# Patient Record
Sex: MALE | Race: WHITE | NOT HISPANIC OR LATINO | Employment: OTHER | URBAN - METROPOLITAN AREA
[De-identification: names, ages, dates, MRNs, and addresses within clinical notes are randomized per-mention and may not be internally consistent; named-entity substitution may affect disease eponyms.]

---

## 2017-08-26 ENCOUNTER — OFFICE VISIT (OUTPATIENT)
Dept: URGENT CARE | Facility: MEDICAL CENTER | Age: 62
End: 2017-08-26
Payer: COMMERCIAL

## 2017-08-26 PROCEDURE — 99213 OFFICE O/P EST LOW 20 MIN: CPT

## 2017-11-09 PROCEDURE — 88305 TISSUE EXAM BY PATHOLOGIST: CPT | Performed by: OTOLARYNGOLOGY

## 2017-11-10 ENCOUNTER — LAB REQUISITION (OUTPATIENT)
Dept: LAB | Facility: HOSPITAL | Age: 62
End: 2017-11-10
Payer: COMMERCIAL

## 2017-11-10 DIAGNOSIS — D48.5 NEOPLASM OF UNCERTAIN BEHAVIOR OF SKIN: ICD-10-CM

## 2019-01-02 ENCOUNTER — TRANSCRIBE ORDERS (OUTPATIENT)
Dept: ADMINISTRATIVE | Facility: HOSPITAL | Age: 64
End: 2019-01-02

## 2019-01-02 DIAGNOSIS — B18.2 CHRONIC HEPATITIS C WITHOUT HEPATIC COMA (HCC): Primary | ICD-10-CM

## 2019-01-04 ENCOUNTER — HOSPITAL ENCOUNTER (OUTPATIENT)
Dept: ULTRASOUND IMAGING | Facility: HOSPITAL | Age: 64
Discharge: HOME | End: 2019-01-04
Attending: INTERNAL MEDICINE
Payer: COMMERCIAL

## 2019-01-04 ENCOUNTER — APPOINTMENT (OUTPATIENT)
Dept: LAB | Facility: HOSPITAL | Age: 64
End: 2019-01-04
Attending: INTERNAL MEDICINE
Payer: COMMERCIAL

## 2019-01-04 ENCOUNTER — TRANSCRIBE ORDERS (OUTPATIENT)
Dept: ADMINISTRATIVE | Facility: HOSPITAL | Age: 64
End: 2019-01-04

## 2019-01-04 DIAGNOSIS — B18.2 CHRONIC HEPATITIS C WITHOUT HEPATIC COMA (HCC): ICD-10-CM

## 2019-01-04 DIAGNOSIS — B18.2 CHRONIC HEPATITIS C WITHOUT HEPATIC COMA (HCC): Primary | ICD-10-CM

## 2019-01-04 LAB
ALBUMIN SERPL BCP-MCNC: 4.2 G/DL (ref 3.5–5.7)
ALP SERPL-CCNC: 75 U/L (ref 55–165)
ALT SERPL W P-5'-P-CCNC: 25 U/L (ref 7–52)
AST SERPL W P-5'-P-CCNC: 47 U/L (ref 13–39)
BILIRUB DIRECT SERPL-MCNC: 0.2 MG/DL (ref 0–0.2)
BILIRUB SERPL-MCNC: 0.9 MG/DL (ref 0.2–1)
FERRITIN SERPL-MCNC: 109 NG/ML (ref 8–388)
IRON SATN MFR SERPL: 57 %
IRON SERPL-MCNC: 190 UG/DL (ref 65–175)
PROT SERPL-MCNC: 6.8 G/DL (ref 6.4–8.9)
TIBC SERPL-MCNC: 335 UG/DL (ref 250–450)

## 2019-01-04 PROCEDURE — 36415 COLL VENOUS BLD VENIPUNCTURE: CPT

## 2019-01-04 PROCEDURE — 83540 ASSAY OF IRON: CPT

## 2019-01-04 PROCEDURE — 87522 HEPATITIS C REVRS TRNSCRPJ: CPT

## 2019-01-04 PROCEDURE — 82390 ASSAY OF CERULOPLASMIN: CPT

## 2019-01-04 PROCEDURE — 80076 HEPATIC FUNCTION PANEL: CPT

## 2019-01-04 PROCEDURE — 86235 NUCLEAR ANTIGEN ANTIBODY: CPT

## 2019-01-04 PROCEDURE — 82103 ALPHA-1-ANTITRYPSIN TOTAL: CPT

## 2019-01-04 PROCEDURE — 76700 US EXAM ABDOM COMPLETE: CPT

## 2019-01-04 PROCEDURE — 82728 ASSAY OF FERRITIN: CPT

## 2019-01-04 PROCEDURE — 83550 IRON BINDING TEST: CPT

## 2019-01-04 PROCEDURE — 87902 NFCT AGT GNTYP ALYS HEP C: CPT

## 2019-01-05 LAB
A1AT SERPL-MCNC: 150 MG/DL (ref 90–200)
ACTIN IGG SERPL-ACNC: 8 UNITS (ref 0–19)
CERULOPLASMIN SERPL-MCNC: 17.9 MG/DL (ref 16–31)

## 2019-01-09 LAB
HCV GENTYP SERPL NAA+PROBE: NORMAL
HCV PLEASE NOTE: NORMAL

## 2019-01-23 LAB
HCV RNA SERPL NAA+PROBE-ACNC: NORMAL IU/ML
HCV RNA SERPL NAA+PROBE-LOG IU: 6.75 LOG10 IU/ML
TEST INFORMATION: NORMAL

## 2019-04-02 ENCOUNTER — APPOINTMENT (OUTPATIENT)
Dept: LAB | Facility: HOSPITAL | Age: 64
End: 2019-04-02
Attending: INTERNAL MEDICINE
Payer: COMMERCIAL

## 2019-04-02 ENCOUNTER — TRANSCRIBE ORDERS (OUTPATIENT)
Dept: ADMINISTRATIVE | Facility: HOSPITAL | Age: 64
End: 2019-04-02

## 2019-04-02 DIAGNOSIS — Z79.899 ENCOUNTER FOR LONG-TERM (CURRENT) USE OF MEDICATIONS: Primary | ICD-10-CM

## 2019-04-02 DIAGNOSIS — E78.2 MIXED HYPERLIPIDEMIA: ICD-10-CM

## 2019-04-02 DIAGNOSIS — B18.2 CHRONIC HEPATITIS C WITHOUT HEPATIC COMA (HCC): ICD-10-CM

## 2019-04-02 DIAGNOSIS — Z79.899 ENCOUNTER FOR LONG-TERM (CURRENT) USE OF MEDICATIONS: ICD-10-CM

## 2019-04-02 LAB
ALBUMIN SERPL BCP-MCNC: 4.3 G/DL (ref 3.5–5.7)
ALP SERPL-CCNC: 72 U/L (ref 55–165)
ALT SERPL W P-5'-P-CCNC: 11 U/L (ref 7–52)
ANION GAP SERPL CALCULATED.3IONS-SCNC: 6 MMOL/L (ref 4–13)
AST SERPL W P-5'-P-CCNC: 23 U/L (ref 13–39)
BASOPHILS # BLD AUTO: 0.1 THOUSANDS/ΜL (ref 0–0.1)
BASOPHILS NFR BLD AUTO: 1 % (ref 0–1)
BILIRUB DIRECT SERPL-MCNC: 0.1 MG/DL (ref 0–0.2)
BILIRUB SERPL-MCNC: 0.5 MG/DL (ref 0.2–1)
BUN SERPL-MCNC: 24 MG/DL (ref 7–25)
CALCIUM SERPL-MCNC: 9.5 MG/DL (ref 8.6–10.5)
CHLORIDE SERPL-SCNC: 102 MMOL/L (ref 98–107)
CHOLEST SERPL-MCNC: 228 MG/DL (ref 0–200)
CO2 SERPL-SCNC: 32 MMOL/L (ref 21–31)
CREAT SERPL-MCNC: 0.96 MG/DL (ref 0.7–1.3)
EOSINOPHIL # BLD AUTO: 0.2 THOUSAND/ΜL (ref 0–0.61)
EOSINOPHIL NFR BLD AUTO: 3 % (ref 0–6)
ERYTHROCYTE [DISTWIDTH] IN BLOOD BY AUTOMATED COUNT: 14 % (ref 11.6–15.1)
GFR SERPL CREATININE-BSD FRML MDRD: 83 ML/MIN/1.73SQ M
GLUCOSE P FAST SERPL-MCNC: 82 MG/DL (ref 65–99)
HCT VFR BLD AUTO: 45 % (ref 42–52)
HDLC SERPL-MCNC: 44 MG/DL (ref 40–60)
HGB BLD-MCNC: 15.4 G/DL (ref 12–17)
LDLC SERPL CALC-MCNC: 107 MG/DL (ref 0–100)
LYMPHOCYTES # BLD AUTO: 1.6 THOUSANDS/ΜL (ref 0.6–4.47)
LYMPHOCYTES NFR BLD AUTO: 25 % (ref 14–44)
MCH RBC QN AUTO: 29.4 PG (ref 26.8–34.3)
MCHC RBC AUTO-ENTMCNC: 34.3 G/DL (ref 31.4–37.4)
MCV RBC AUTO: 86 FL (ref 82–98)
MONOCYTES # BLD AUTO: 0.6 THOUSAND/ΜL (ref 0.17–1.22)
MONOCYTES NFR BLD AUTO: 9 % (ref 4–12)
NEUTROPHILS # BLD AUTO: 4 THOUSANDS/ΜL (ref 1.85–7.62)
NEUTS SEG NFR BLD AUTO: 62 % (ref 43–75)
NONHDLC SERPL-MCNC: 184 MG/DL
PLATELET # BLD AUTO: 177 THOUSANDS/UL (ref 149–390)
PMV BLD AUTO: 8.4 FL (ref 8.9–12.7)
POTASSIUM SERPL-SCNC: 4.3 MMOL/L (ref 3.5–5.5)
PROT SERPL-MCNC: 6.9 G/DL (ref 6.4–8.9)
RBC # BLD AUTO: 5.23 MILLION/UL (ref 3.88–5.62)
SODIUM SERPL-SCNC: 140 MMOL/L (ref 134–143)
TRIGL SERPL-MCNC: 384 MG/DL (ref 44–166)
WBC # BLD AUTO: 6.5 THOUSAND/UL (ref 4.31–10.16)

## 2019-04-02 PROCEDURE — 36415 COLL VENOUS BLD VENIPUNCTURE: CPT

## 2019-04-02 PROCEDURE — 85025 COMPLETE CBC W/AUTO DIFF WBC: CPT

## 2019-04-02 PROCEDURE — 80061 LIPID PANEL: CPT

## 2019-04-02 PROCEDURE — 80053 COMPREHEN METABOLIC PANEL: CPT

## 2019-04-02 PROCEDURE — 82248 BILIRUBIN DIRECT: CPT

## 2019-08-19 ENCOUNTER — APPOINTMENT (OUTPATIENT)
Dept: LAB | Facility: HOSPITAL | Age: 64
End: 2019-08-19
Attending: INTERNAL MEDICINE
Payer: COMMERCIAL

## 2019-08-19 ENCOUNTER — TRANSCRIBE ORDERS (OUTPATIENT)
Dept: URGENT CARE | Facility: HOSPITAL | Age: 64
End: 2019-08-19

## 2019-08-19 DIAGNOSIS — B19.20 HEPATITIS C VIRUS INFECTION WITHOUT HEPATIC COMA, UNSPECIFIED CHRONICITY: ICD-10-CM

## 2019-08-19 DIAGNOSIS — B19.20 HEPATITIS C VIRUS INFECTION WITHOUT HEPATIC COMA, UNSPECIFIED CHRONICITY: Primary | ICD-10-CM

## 2019-08-19 PROCEDURE — 87522 HEPATITIS C REVRS TRNSCRPJ: CPT

## 2019-08-19 PROCEDURE — 36415 COLL VENOUS BLD VENIPUNCTURE: CPT

## 2019-08-21 LAB
HCV RNA SERPL NAA+PROBE-ACNC: NORMAL IU/ML
TEST INFORMATION: NORMAL

## 2020-04-21 ENCOUNTER — APPOINTMENT (OUTPATIENT)
Dept: LAB | Facility: CLINIC | Age: 65
End: 2020-04-21
Payer: COMMERCIAL

## 2020-04-21 ENCOUNTER — TRANSCRIBE ORDERS (OUTPATIENT)
Dept: URGENT CARE | Facility: CLINIC | Age: 65
End: 2020-04-21

## 2020-04-21 DIAGNOSIS — E74.89: ICD-10-CM

## 2020-04-21 DIAGNOSIS — B18.2 CHRONIC HEPATITIS C WITHOUT HEPATIC COMA (HCC): ICD-10-CM

## 2020-04-21 DIAGNOSIS — E78.5 HYPERLIPIDEMIA, UNSPECIFIED HYPERLIPIDEMIA TYPE: ICD-10-CM

## 2020-04-21 DIAGNOSIS — Z79.899 ENCOUNTER FOR LONG-TERM (CURRENT) USE OF OTHER MEDICATIONS: ICD-10-CM

## 2020-04-21 DIAGNOSIS — E78.5 HYPERLIPIDEMIA, UNSPECIFIED HYPERLIPIDEMIA TYPE: Primary | ICD-10-CM

## 2020-04-21 DIAGNOSIS — R63.5 WEIGHT GAIN: ICD-10-CM

## 2020-04-21 DIAGNOSIS — E55.9 AVITAMINOSIS D: ICD-10-CM

## 2020-04-21 DIAGNOSIS — B18.2 CHRONIC HEPATITIS C WITHOUT HEPATIC COMA (HCC): Primary | ICD-10-CM

## 2020-04-21 LAB
25(OH)D3 SERPL-MCNC: 27.6 NG/ML (ref 30–100)
ALBUMIN SERPL BCP-MCNC: 4.5 G/DL (ref 3.5–5)
ALP SERPL-CCNC: 76 U/L (ref 46–116)
ALT SERPL W P-5'-P-CCNC: 17 U/L (ref 12–78)
ANION GAP SERPL CALCULATED.3IONS-SCNC: 4 MMOL/L (ref 4–13)
AST SERPL W P-5'-P-CCNC: 25 U/L (ref 5–45)
BASOPHILS # BLD AUTO: 0.03 THOUSANDS/ΜL (ref 0–0.1)
BASOPHILS NFR BLD AUTO: 1 % (ref 0–1)
BILIRUB SERPL-MCNC: 0.57 MG/DL (ref 0.2–1)
BUN SERPL-MCNC: 19 MG/DL (ref 5–25)
CALCIUM SERPL-MCNC: 9.8 MG/DL (ref 8.3–10.1)
CHLORIDE SERPL-SCNC: 108 MMOL/L (ref 100–108)
CHOLEST SERPL-MCNC: 208 MG/DL (ref 50–200)
CO2 SERPL-SCNC: 29 MMOL/L (ref 21–32)
CREAT SERPL-MCNC: 0.99 MG/DL (ref 0.6–1.3)
EOSINOPHIL # BLD AUTO: 0.16 THOUSAND/ΜL (ref 0–0.61)
EOSINOPHIL NFR BLD AUTO: 3 % (ref 0–6)
ERYTHROCYTE [DISTWIDTH] IN BLOOD BY AUTOMATED COUNT: 12.4 % (ref 11.6–15.1)
EST. AVERAGE GLUCOSE BLD GHB EST-MCNC: 117 MG/DL
GFR SERPL CREATININE-BSD FRML MDRD: 80 ML/MIN/1.73SQ M
GLUCOSE P FAST SERPL-MCNC: 100 MG/DL (ref 65–99)
HBA1C MFR BLD: 5.7 %
HCT VFR BLD AUTO: 48 % (ref 36.5–49.3)
HDLC SERPL-MCNC: 43 MG/DL
HGB BLD-MCNC: 15.5 G/DL (ref 12–17)
IMM GRANULOCYTES # BLD AUTO: 0 THOUSAND/UL (ref 0–0.2)
IMM GRANULOCYTES NFR BLD AUTO: 0 % (ref 0–2)
LDLC SERPL CALC-MCNC: 145 MG/DL (ref 0–100)
LYMPHOCYTES # BLD AUTO: 1.15 THOUSANDS/ΜL (ref 0.6–4.47)
LYMPHOCYTES NFR BLD AUTO: 21 % (ref 14–44)
MCH RBC QN AUTO: 27.9 PG (ref 26.8–34.3)
MCHC RBC AUTO-ENTMCNC: 32.3 G/DL (ref 31.4–37.4)
MCV RBC AUTO: 86 FL (ref 82–98)
MONOCYTES # BLD AUTO: 0.44 THOUSAND/ΜL (ref 0.17–1.22)
MONOCYTES NFR BLD AUTO: 8 % (ref 4–12)
NEUTROPHILS # BLD AUTO: 3.83 THOUSANDS/ΜL (ref 1.85–7.62)
NEUTS SEG NFR BLD AUTO: 67 % (ref 43–75)
NONHDLC SERPL-MCNC: 165 MG/DL
NRBC BLD AUTO-RTO: 0 /100 WBCS
PLATELET # BLD AUTO: 161 THOUSANDS/UL (ref 149–390)
PMV BLD AUTO: 10.5 FL (ref 8.9–12.7)
POTASSIUM SERPL-SCNC: 4.3 MMOL/L (ref 3.5–5.3)
PROT SERPL-MCNC: 7.5 G/DL (ref 6.4–8.2)
RBC # BLD AUTO: 5.56 MILLION/UL (ref 3.88–5.62)
SODIUM SERPL-SCNC: 141 MMOL/L (ref 136–145)
T4 FREE SERPL-MCNC: 0.8 NG/DL (ref 0.76–1.46)
TRIGL SERPL-MCNC: 99 MG/DL
TSH SERPL DL<=0.05 MIU/L-ACNC: 2.15 UIU/ML (ref 0.36–3.74)
WBC # BLD AUTO: 5.61 THOUSAND/UL (ref 4.31–10.16)

## 2020-04-21 PROCEDURE — 83036 HEMOGLOBIN GLYCOSYLATED A1C: CPT

## 2020-04-21 PROCEDURE — 80053 COMPREHEN METABOLIC PANEL: CPT

## 2020-04-21 PROCEDURE — 82306 VITAMIN D 25 HYDROXY: CPT

## 2020-04-21 PROCEDURE — 85025 COMPLETE CBC W/AUTO DIFF WBC: CPT

## 2020-04-21 PROCEDURE — 87522 HEPATITIS C REVRS TRNSCRPJ: CPT

## 2020-04-21 PROCEDURE — 36415 COLL VENOUS BLD VENIPUNCTURE: CPT

## 2020-04-21 PROCEDURE — 84439 ASSAY OF FREE THYROXINE: CPT

## 2020-04-21 PROCEDURE — 84443 ASSAY THYROID STIM HORMONE: CPT

## 2020-04-21 PROCEDURE — 80061 LIPID PANEL: CPT

## 2020-04-23 LAB
HCV RNA SERPL NAA+PROBE-ACNC: NORMAL IU/ML
TEST INFORMATION: NORMAL

## 2020-06-01 ENCOUNTER — TELEPHONE (OUTPATIENT)
Dept: OBGYN CLINIC | Facility: HOSPITAL | Age: 65
End: 2020-06-01

## 2020-06-01 DIAGNOSIS — M17.0 PRIMARY OSTEOARTHRITIS OF BOTH KNEES: Primary | ICD-10-CM

## 2020-06-16 DIAGNOSIS — M17.0 PRIMARY OSTEOARTHRITIS OF BOTH KNEES: Primary | ICD-10-CM

## 2020-06-18 ENCOUNTER — TELEPHONE (OUTPATIENT)
Dept: OBGYN CLINIC | Facility: HOSPITAL | Age: 65
End: 2020-06-18

## 2020-06-22 ENCOUNTER — OFFICE VISIT (OUTPATIENT)
Dept: OBGYN CLINIC | Facility: CLINIC | Age: 65
End: 2020-06-22
Payer: COMMERCIAL

## 2020-06-22 VITALS — SYSTOLIC BLOOD PRESSURE: 136 MMHG | WEIGHT: 193.4 LBS | DIASTOLIC BLOOD PRESSURE: 76 MMHG | TEMPERATURE: 97.7 F

## 2020-06-22 DIAGNOSIS — M17.0 PRIMARY OSTEOARTHRITIS OF BOTH KNEES: ICD-10-CM

## 2020-06-22 DIAGNOSIS — M19.012 PRIMARY OSTEOARTHRITIS OF BOTH SHOULDERS: Primary | ICD-10-CM

## 2020-06-22 DIAGNOSIS — M19.011 PRIMARY OSTEOARTHRITIS OF BOTH SHOULDERS: Primary | ICD-10-CM

## 2020-06-22 PROCEDURE — 99213 OFFICE O/P EST LOW 20 MIN: CPT | Performed by: ORTHOPAEDIC SURGERY

## 2020-06-22 RX ORDER — HYDROCODONE BITARTRATE AND ACETAMINOPHEN 5; 325 MG/1; MG/1
1 TABLET ORAL EVERY 6 HOURS PRN
COMMUNITY

## 2020-07-06 ENCOUNTER — TELEPHONE (OUTPATIENT)
Dept: OBGYN CLINIC | Facility: HOSPITAL | Age: 65
End: 2020-07-06

## 2020-07-06 ENCOUNTER — OFFICE VISIT (OUTPATIENT)
Dept: OBGYN CLINIC | Facility: CLINIC | Age: 65
End: 2020-07-06
Payer: COMMERCIAL

## 2020-07-06 VITALS
SYSTOLIC BLOOD PRESSURE: 136 MMHG | TEMPERATURE: 98.7 F | BODY MASS INDEX: 24.31 KG/M2 | WEIGHT: 189.4 LBS | DIASTOLIC BLOOD PRESSURE: 86 MMHG | HEIGHT: 74 IN

## 2020-07-06 DIAGNOSIS — M77.02 EPICONDYLITIS ELBOW, MEDIAL, LEFT: ICD-10-CM

## 2020-07-06 DIAGNOSIS — M19.011 PRIMARY OSTEOARTHRITIS OF BOTH SHOULDERS: Primary | ICD-10-CM

## 2020-07-06 DIAGNOSIS — M70.61 TROCHANTERIC BURSITIS OF RIGHT HIP: ICD-10-CM

## 2020-07-06 DIAGNOSIS — M19.012 PRIMARY OSTEOARTHRITIS OF BOTH SHOULDERS: Primary | ICD-10-CM

## 2020-07-06 DIAGNOSIS — M17.0 PRIMARY OSTEOARTHRITIS OF BOTH KNEES: ICD-10-CM

## 2020-07-06 PROCEDURE — 99213 OFFICE O/P EST LOW 20 MIN: CPT | Performed by: ORTHOPAEDIC SURGERY

## 2020-07-06 PROCEDURE — 20610 DRAIN/INJ JOINT/BURSA W/O US: CPT | Performed by: PHYSICIAN ASSISTANT

## 2020-07-06 PROCEDURE — 20605 DRAIN/INJ JOINT/BURSA W/O US: CPT | Performed by: PHYSICIAN ASSISTANT

## 2020-07-06 RX ORDER — METHYLPREDNISOLONE ACETATE 40 MG/ML
2 INJECTION, SUSPENSION INTRA-ARTICULAR; INTRALESIONAL; INTRAMUSCULAR; SOFT TISSUE
Status: COMPLETED | OUTPATIENT
Start: 2020-07-06 | End: 2020-07-06

## 2020-07-06 RX ORDER — BUPIVACAINE HYDROCHLORIDE 5 MG/ML
1 INJECTION, SOLUTION PERINEURAL
Status: COMPLETED | OUTPATIENT
Start: 2020-07-06 | End: 2020-07-06

## 2020-07-06 RX ORDER — BUPIVACAINE HYDROCHLORIDE 5 MG/ML
3.5 INJECTION, SOLUTION PERINEURAL
Status: COMPLETED | OUTPATIENT
Start: 2020-07-06 | End: 2020-07-06

## 2020-07-06 RX ORDER — BETAMETHASONE SODIUM PHOSPHATE AND BETAMETHASONE ACETATE 3; 3 MG/ML; MG/ML
6 INJECTION, SUSPENSION INTRA-ARTICULAR; INTRALESIONAL; INTRAMUSCULAR; SOFT TISSUE
Status: COMPLETED | OUTPATIENT
Start: 2020-07-06 | End: 2020-07-06

## 2020-07-06 RX ADMIN — BUPIVACAINE HYDROCHLORIDE 3.5 ML: 5 INJECTION, SOLUTION PERINEURAL at 08:25

## 2020-07-06 RX ADMIN — METHYLPREDNISOLONE ACETATE 2 ML: 40 INJECTION, SUSPENSION INTRA-ARTICULAR; INTRALESIONAL; INTRAMUSCULAR; SOFT TISSUE at 08:25

## 2020-07-06 RX ADMIN — METHYLPREDNISOLONE ACETATE 2 ML: 40 INJECTION, SUSPENSION INTRA-ARTICULAR; INTRALESIONAL; INTRAMUSCULAR; SOFT TISSUE at 08:24

## 2020-07-06 RX ADMIN — BUPIVACAINE HYDROCHLORIDE 3.5 ML: 5 INJECTION, SOLUTION PERINEURAL at 08:27

## 2020-07-06 RX ADMIN — BUPIVACAINE HYDROCHLORIDE 1 ML: 5 INJECTION, SOLUTION PERINEURAL at 08:26

## 2020-07-06 RX ADMIN — BUPIVACAINE HYDROCHLORIDE 3.5 ML: 5 INJECTION, SOLUTION PERINEURAL at 08:24

## 2020-07-06 RX ADMIN — METHYLPREDNISOLONE ACETATE 2 ML: 40 INJECTION, SUSPENSION INTRA-ARTICULAR; INTRALESIONAL; INTRAMUSCULAR; SOFT TISSUE at 08:27

## 2020-07-06 RX ADMIN — BETAMETHASONE SODIUM PHOSPHATE AND BETAMETHASONE ACETATE 6 MG: 3; 3 INJECTION, SUSPENSION INTRA-ARTICULAR; INTRALESIONAL; INTRAMUSCULAR; SOFT TISSUE at 08:26

## 2020-07-06 NOTE — TELEPHONE ENCOUNTER
Called and spoke to patient letting him know that the pharmacy has been trying to reach him in order to get consent for shipment of the medication

## 2020-07-06 NOTE — PROGRESS NOTES
ASSESSMENT/PLAN:    Diagnoses and all orders for this visit:    Primary osteoarthritis of both shoulders  -     Large joint arthrocentesis: bilateral subacromial bursa    Primary osteoarthritis of both knees  -     Large joint arthrocentesis: bilateral knee    Trochanteric bursitis of right hip  -     Large joint arthrocentesis: R greater trochanteric bursa    Epicondylitis elbow, medial, left  -     Medium joint arthrocentesis: L elbow        The patient's bilateral knees and shoulders were injected with Depo-Medrol and Marcaine  His left elbow was injected with Celestone and Marcaine  His right trochanteric bursa was injected with Depo-Medrol and Marcaine  Return in about 3 months (around 10/6/2020)  _____________________________________________________  CHIEF COMPLAINT:  Chief Complaint   Patient presents with    Left Knee - Follow-up    Right Knee - Follow-up    Left Shoulder - Follow-up    Right Shoulder - Follow-up    Right Hip - Follow-up    Left Elbow - Follow-up         SUBJECTIVE:  Milana Brito is a 76 y o  male with a past medical history of primary osteoarthritis of bilateral shoulders and knees, trochanteric bursitis of the right hip, and medial epicondylitis of the left elbow  He would like injections today, prior to leaving on vacation  He denies any numbness or tingling  He denies any fever or chills  PAST MEDICAL HISTORY:  Past Medical History:   Diagnosis Date    Arthritis     Lumbar herniated disc        PAST SURGICAL HISTORY:  Past Surgical History:   Procedure Laterality Date    HERNIA REPAIR  01/03/2005    KNEE ARTHROSCOPY Left 11/30/2005    KNEE ARTHROSCOPY Right 07/20/2005    SHOULDER ARTHROSCOPY Left     in the 1980's    TONSILLECTOMY         FAMILY HISTORY:  History reviewed  No pertinent family history      SOCIAL HISTORY:  Social History     Tobacco Use    Smoking status: Never Smoker    Smokeless tobacco: Never Used   Substance Use Topics    Alcohol use: Not Currently     Comment: quit 2000    Drug use: Not on file       MEDICATIONS:    Current Outpatient Medications:     HYDROcodone-acetaminophen (NORCO) 5-325 mg per tablet, Take 1 tablet by mouth every 6 (six) hours as needed for pain, Disp: , Rfl:     ALLERGIES:  Allergies   Allergen Reactions    Tetracyclines & Related        ROS:  Review of Systems     Constitutional: Negative for fatigue, fever or loss of apetite  HENT: Negative  Respiratory: Negative for shortness of breath, dyspnea  Cardiovascular: Negative for chest pain/tightness  Gastrointestinal: Negative for abdominal pain, N/V  Endocrine: Negative for cold/heat intolerance, unexplained weight loss/gain  Genitourinary: Negative for flank pain, dysuria, hematuria  Musculoskeletal: Positive for arthralgia   Skin: Negative for rash  Neurological: Negative for numbness or tingling  Psychiatric/Behavioral: Negative for agitation  _____________________________________________________  PHYSICAL EXAMINATION:    Blood pressure 136/86, temperature 98 7 °F (37 1 °C), height 6' 2" (1 88 m), weight 85 9 kg (189 lb 6 4 oz)  Constitutional: Oriented to person, place, and time  Appears well-developed and well-nourished  No distress  HENT:   Head: Normocephalic  Eyes: Conjunctivae are normal  Right eye exhibits no discharge  Left eye exhibits no discharge  No scleral icterus  Cardiovascular: Normal rate  Pulmonary/Chest: Effort normal    Neurological: Alert and oriented to person, place, and time  Skin: Skin is warm and dry  No rash noted  Not diaphoretic  No erythema  No pallor  Psychiatric: Normal mood and affect   Behavior is normal  Judgment and thought content normal       MUSCULOSKELETAL EXAMINATION:   Physical Exam  Ortho Exam    Bilateral lower extremities are neurovascularly intact  Toes are pink and mobile   Compartments are soft  No warmth, erythema or ecchymosis  ROM of her knees are from 5-115 degrees  Negative Lachman, drawer or pivot shift  No medial instability  Medial joint line tenderness, slight lateral joint line tenderness  Patellofemoral crepitation  Tenderness over right trochanteric bursa    Neck was soft and supple  There is a negative axial compression test in his neck  Bilateral upper extremity with neurovascularly intact  Fingers are pink and mobile  Compartments are soft  Range of motion of the shoulders was intact  There is minimal signs of impingement  There is no instability  Glenohumeral crepitation is present  Rotator cuff strength is 4/5  Pulses intact  Right elbow range of motion was from 0-130 degrees  Rotation was 90° in each direction  There is no collateral ligament dysfunction  There is pain along the medial epicondyle  Objective:  BP Readings from Last 1 Encounters:   07/06/20 136/86      Wt Readings from Last 1 Encounters:   07/06/20 85 9 kg (189 lb 6 4 oz)        BMI:   Estimated body mass index is 24 32 kg/m² as calculated from the following:    Height as of this encounter: 6' 2" (1 88 m)  Weight as of this encounter: 85 9 kg (189 lb 6 4 oz)      PROCEDURES PERFORMED:  Large joint arthrocentesis: bilateral subacromial bursa  Date/Time: 7/6/2020 8:24 AM  Consent given by: patient  Site marked: site marked  Timeout: Immediately prior to procedure a time out was called to verify the correct patient, procedure, equipment, support staff and site/side marked as required   Supporting Documentation  Indications: pain   Procedure Details  Location: shoulder - bilateral subacromial bursa  Preparation: Patient was prepped and draped in the usual sterile fashion  Needle size: 22 G  Ultrasound guidance: no  Approach: lateral    Medications (Right): 3 5 mL bupivacaine 0 5 %; 2 mL methylPREDNISolone acetate 40 mg/mLMedications (Left): 3 5 mL bupivacaine 0 5 %; 2 mL methylPREDNISolone acetate 40 mg/mL   Patient tolerance: patient tolerated the procedure well with no immediate complications  Dressing:  Sterile dressing applied    Large joint arthrocentesis: bilateral knee  Date/Time: 7/6/2020 8:25 AM  Consent given by: patient  Site marked: site marked  Timeout: Immediately prior to procedure a time out was called to verify the correct patient, procedure, equipment, support staff and site/side marked as required   Supporting Documentation  Indications: pain   Procedure Details  Location: knee - bilateral knee  Preparation: Patient was prepped and draped in the usual sterile fashion  Needle size: 22 G  Ultrasound guidance: no  Approach: lateral    Medications (Right): 3 5 mL bupivacaine 0 5 %; 2 mL methylPREDNISolone acetate 40 mg/mLMedications (Left): 3 5 mL bupivacaine 0 5 %; 2 mL methylPREDNISolone acetate 40 mg/mL   Patient tolerance: patient tolerated the procedure well with no immediate complications  Dressing:  Sterile dressing applied    Medium joint arthrocentesis: L elbow  Date/Time: 7/6/2020 8:26 AM  Consent given by: patient  Site marked: site marked  Timeout: Immediately prior to procedure a time out was called to verify the correct patient, procedure, equipment, support staff and site/side marked as required   Supporting Documentation  Indications: pain   Procedure Details  Location: elbow - L elbow  Medications administered: 1 mL bupivacaine 0 5 %; 6 mg betamethasone acetate-betamethasone sodium phosphate 6 (3-3) mg/mL    Patient tolerance: patient tolerated the procedure well with no immediate complications  Dressing:  Sterile dressing applied    Large joint arthrocentesis: R greater trochanteric bursa  Date/Time: 7/6/2020 8:27 AM  Consent given by: patient  Site marked: site marked  Timeout: Immediately prior to procedure a time out was called to verify the correct patient, procedure, equipment, support staff and site/side marked as required   Supporting Documentation  Indications: pain   Procedure Details  Location: hip - R greater trochanteric bursa  Preparation: Patient was prepped and draped in the usual sterile fashion  Needle size: 22 G  Approach: lateral  Medications administered: 3 5 mL bupivacaine 0 5 %; 2 mL methylPREDNISolone acetate 40 mg/mL    Patient tolerance: patient tolerated the procedure well with no immediate complications  Dressing:  Sterile dressing applied            Lucero Loera PA-C

## 2020-07-28 NOTE — TELEPHONE ENCOUNTER
I am not sure what medication a sent to his house  When he comes for the 1st injection, we can determine how many subsequent follow-up visits he will need for the remainder of the injections    Thank you

## 2020-07-28 NOTE — TELEPHONE ENCOUNTER
Patient called asking to speak to injection team  Per patient, he is receiving 5 injections? Patient has an appt on 07 31 but states he needs more appts w/ Dr Rosa Villegas in order to receive the injections    Please advise  How many appts does patient need? Referral states " Patient called the office to let us know that his injections were mailed to his house    I made his first appt for the Euflexxa in the [place] office, he will bring the injections to his appt, his future appts if needed will need to be scheduled  "

## 2020-07-29 NOTE — TELEPHONE ENCOUNTER
Spoke to patient he is very willing to try a new medication and is hoping for good results  I reminded him to bring the medications to his appointment on Friday

## 2020-07-31 ENCOUNTER — OFFICE VISIT (OUTPATIENT)
Dept: OBGYN CLINIC | Facility: CLINIC | Age: 65
End: 2020-07-31
Payer: COMMERCIAL

## 2020-07-31 VITALS
DIASTOLIC BLOOD PRESSURE: 88 MMHG | BODY MASS INDEX: 23.44 KG/M2 | WEIGHT: 182.6 LBS | SYSTOLIC BLOOD PRESSURE: 151 MMHG | HEIGHT: 74 IN | HEART RATE: 71 BPM

## 2020-07-31 DIAGNOSIS — M17.0 PRIMARY OSTEOARTHRITIS OF BOTH KNEES: Primary | ICD-10-CM

## 2020-07-31 PROCEDURE — 20610 DRAIN/INJ JOINT/BURSA W/O US: CPT | Performed by: ORTHOPAEDIC SURGERY

## 2020-07-31 RX ORDER — HYALURONATE SODIUM 10 MG/ML
20 SYRINGE (ML) INTRAARTICULAR
Status: SHIPPED | OUTPATIENT
Start: 2020-07-31

## 2020-07-31 RX ADMIN — Medication 20 MG: at 12:12

## 2020-07-31 NOTE — PROGRESS NOTES
ASSESSMENT/PLAN:    Diagnoses and all orders for this visit:    Primary osteoarthritis of both knees    Other orders  -     Large joint arthrocentesis      Both knees were injected with his first set of Euflexxa  He tolerated the injections quite well  He will follow up next week for his second set of Euflexxa  Return in about 1 week (around 8/7/2020)  _____________________________________________________  CHIEF COMPLAINT:  Chief Complaint   Patient presents with    Left Knee - Follow-up    Right Knee - Follow-up         SUBJECTIVE:  Haim Friend is a 76y o  year old male who presents for viscosupplementation of bilateral knees  He is here to start Euflexxa injections  He complains of knee pain  He denies any numbness or tingling  He denies any fever or chills  PAST MEDICAL HISTORY:  Past Medical History:   Diagnosis Date    Arthritis     Lumbar herniated disc        PAST SURGICAL HISTORY:  Past Surgical History:   Procedure Laterality Date    HERNIA REPAIR  01/03/2005    KNEE ARTHROSCOPY Left 11/30/2005    KNEE ARTHROSCOPY Right 07/20/2005    SHOULDER ARTHROSCOPY Left     in the 1980's    TONSILLECTOMY         FAMILY HISTORY:  History reviewed  No pertinent family history  SOCIAL HISTORY:  Social History     Tobacco Use    Smoking status: Never Smoker    Smokeless tobacco: Never Used   Substance Use Topics    Alcohol use: Not Currently     Comment: quit 2000    Drug use: Not on file       MEDICATIONS:    Current Outpatient Medications:     HYDROcodone-acetaminophen (NORCO) 5-325 mg per tablet, Take 1 tablet by mouth every 6 (six) hours as needed for pain, Disp: , Rfl:     ALLERGIES:  Allergies   Allergen Reactions    Tetracyclines & Related        REVIEW OF SYSTEMS:  Pertinent items are noted in HPI    A comprehensive review of systems was negative   _____________________________________________________  PHYSICAL EXAMINATION:  General: well developed and well nourished, alert, oriented times 3 and appears comfortable  Psychiatric: Normal  HEENT:  Normocephalic, atraumatic  Cardiovascular:  Regular  Pulmonary: No wheezing or stridor  Skin: No masses, erthema, lacerations, fluctation, ulcerations  Neurovascular: Motor and sensory exams are grossly intact, +2 PT pulse       MUSCULOSKELETAL EXAMINATION:    Bilateral lower extremities are neurovascularly intact  Toes are pink and mobile   Compartments are soft  No warmth, erythema or ecchymosis  ROM of knees are from 5-115 degrees  Negative Lachman, drawer or pivot shift  No medial instability  Medial joint line tenderness, slight lateral joint line tenderness  Patellofemoral crepitation    _____________________________________________________    PROCEDURES PERFORMED:  Large joint arthrocentesis: bilateral knee  Date/Time: 7/31/2020 12:12 PM  Consent given by: patient  Site marked: site marked  Timeout: Immediately prior to procedure a time out was called to verify the correct patient, procedure, equipment, support staff and site/side marked as required   Supporting Documentation  Indications: pain   Procedure Details  Location: knee - bilateral knee  Preparation: Patient was prepped and draped in the usual sterile fashion  Needle size: 22 G  Approach: lateral    Medications (Right): 20 mg Sodium Hyaluronate 20 MG/2MLMedications (Left): 20 mg Sodium Hyaluronate 20 MG/2ML   Patient tolerance: patient tolerated the procedure well with no immediate complications  Dressing:  Sterile dressing applied              Re Laws PA-C

## 2020-08-07 ENCOUNTER — OFFICE VISIT (OUTPATIENT)
Dept: OBGYN CLINIC | Facility: CLINIC | Age: 65
End: 2020-08-07
Payer: COMMERCIAL

## 2020-08-07 VITALS
BODY MASS INDEX: 23.36 KG/M2 | WEIGHT: 182 LBS | DIASTOLIC BLOOD PRESSURE: 70 MMHG | SYSTOLIC BLOOD PRESSURE: 130 MMHG | HEIGHT: 74 IN | TEMPERATURE: 97.6 F

## 2020-08-07 DIAGNOSIS — M17.0 PRIMARY OSTEOARTHRITIS OF BOTH KNEES: Primary | ICD-10-CM

## 2020-08-07 PROCEDURE — 20610 DRAIN/INJ JOINT/BURSA W/O US: CPT | Performed by: ORTHOPAEDIC SURGERY

## 2020-08-07 RX ORDER — HYALURONATE SODIUM 10 MG/ML
20 SYRINGE (ML) INTRAARTICULAR
Status: COMPLETED | OUTPATIENT
Start: 2020-08-07 | End: 2020-08-07

## 2020-08-07 RX ADMIN — Medication 20 MG: at 08:30

## 2020-08-07 NOTE — PROGRESS NOTES
ASSESSMENT/PLAN:    Diagnoses and all orders for this visit:    Primary osteoarthritis of both knees      Both knees were injected with his second set of Euflexxa  He tolerated the injections quite well  He will follow up next week for his third and final set of Euflexxa  Return in about 1 week (around 8/14/2020)  _____________________________________________________  CHIEF COMPLAINT:  Chief Complaint   Patient presents with    Left Knee - Follow-up    Right Knee - Follow-up         SUBJECTIVE:  Skip Baez is a 76y o  year old male who presents for viscosupplementation of bilateral knees  He is here today for his second set of Euflexxa  He tolerated last week's injections without issue  He denies any fever or chills  He denies any numbness or tingling  PAST MEDICAL HISTORY:  Past Medical History:   Diagnosis Date    Arthritis     Lumbar herniated disc        PAST SURGICAL HISTORY:  Past Surgical History:   Procedure Laterality Date    HERNIA REPAIR  01/03/2005    KNEE ARTHROSCOPY Left 11/30/2005    KNEE ARTHROSCOPY Right 07/20/2005    SHOULDER ARTHROSCOPY Left     in the 1980's    TONSILLECTOMY         FAMILY HISTORY:  History reviewed  No pertinent family history      SOCIAL HISTORY:  Social History     Tobacco Use    Smoking status: Never Smoker    Smokeless tobacco: Never Used   Substance Use Topics    Alcohol use: Not Currently     Comment: quit 2000    Drug use: Not on file       MEDICATIONS:    Current Outpatient Medications:     HYDROcodone-acetaminophen (NORCO) 5-325 mg per tablet, Take 1 tablet by mouth every 6 (six) hours as needed for pain, Disp: , Rfl:     Current Facility-Administered Medications:     Sodium Hyaluronate 20 mg, 20 mg, Intra-articular, , Deepa Hillman PA-C, 20 mg at 07/31/20 1212    Sodium Hyaluronate 20 mg, 20 mg, Intra-articular, , Deepa Hillman PA-C, 20 mg at 07/31/20 1212    ALLERGIES:  Allergies   Allergen Reactions    Tetracyclines & Related        REVIEW OF SYSTEMS:  Pertinent items are noted in HPI  A comprehensive review of systems was negative   _____________________________________________________  PHYSICAL EXAMINATION:  General: well developed and well nourished, alert, oriented times 3 and appears comfortable  Psychiatric: Normal  HEENT:  Normocephalic, atraumatic  Cardiovascular:  Regular  Pulmonary: No wheezing or stridor  Skin: No masses, erthema, lacerations, fluctation, ulcerations  Neurovascular: Motor and sensory exams are grossly intact, +2 PT pulse       MUSCULOSKELETAL EXAMINATION:    Bilateral lower extremities are neurovascularly intact  Toes are pink and mobile   Compartments are soft  No warmth, erythema or ecchymosis  ROM of knees are from 5-115 degrees  Negative Lachman, drawer or pivot shift  No medial instability  Medial joint line tenderness, slight lateral joint line tenderness  Patellofemoral crepitation    _____________________________________________________    PROCEDURES PERFORMED:  Large joint arthrocentesis: bilateral knee  Date/Time: 8/7/2020 8:30 AM  Consent given by: patient  Site marked: site marked  Timeout: Immediately prior to procedure a time out was called to verify the correct patient, procedure, equipment, support staff and site/side marked as required   Supporting Documentation  Indications: pain   Procedure Details  Location: knee - bilateral knee  Preparation: Patient was prepped and draped in the usual sterile fashion  Needle size: 22 G  Approach: lateral    Medications (Right): 20 mg Sodium Hyaluronate 20 MG/2MLMedications (Left): 20 mg Sodium Hyaluronate 20 MG/2ML   Patient tolerance: patient tolerated the procedure well with no immediate complications  Dressing:  Sterile dressing applied          Obed Wood PA-C

## 2020-08-11 ENCOUNTER — OFFICE VISIT (OUTPATIENT)
Dept: OBGYN CLINIC | Facility: CLINIC | Age: 65
End: 2020-08-11
Payer: COMMERCIAL

## 2020-08-11 VITALS
WEIGHT: 182 LBS | TEMPERATURE: 97.4 F | DIASTOLIC BLOOD PRESSURE: 76 MMHG | BODY MASS INDEX: 23.36 KG/M2 | SYSTOLIC BLOOD PRESSURE: 148 MMHG | HEIGHT: 74 IN

## 2020-08-11 DIAGNOSIS — M17.0 PRIMARY OSTEOARTHRITIS OF BOTH KNEES: Primary | ICD-10-CM

## 2020-08-11 PROCEDURE — 20610 DRAIN/INJ JOINT/BURSA W/O US: CPT | Performed by: ORTHOPAEDIC SURGERY

## 2020-08-11 RX ORDER — HYALURONATE SODIUM 10 MG/ML
20 SYRINGE (ML) INTRAARTICULAR
Status: COMPLETED | OUTPATIENT
Start: 2020-08-11 | End: 2020-08-11

## 2020-08-11 RX ADMIN — Medication 20 MG: at 08:32

## 2020-08-11 NOTE — PROGRESS NOTES
Assessment/Plan:    No problem-specific Assessment & Plan notes found for this encounter  Diagnoses and all orders for this visit:    Primary osteoarthritis of both knees          Both knees were injected with his 3rd set of Euflexxa  He tolerated the procedure quite well  He returned back in 6 weeks for a strength and motion check  Subjective:      Patient ID: Miryam Lares is a 76 y o  male  HPI    The patient presents for his 3rd set of Euflexxa was bilateral knees  He states the pain is starting to ease up  He denies any numbness or tingling  He denies any fever chills  He is walking the office with less of a limp    The following portions of the patient's history were reviewed and updated as appropriate: allergies, current medications, past family history, past medical history, past social history, past surgical history and problem list     Review of Systems   Constitutional: Negative for chills, fever and unexpected weight change  HENT: Negative for hearing loss, nosebleeds and sore throat  Eyes: Negative for pain, redness and visual disturbance  Respiratory: Negative for cough, shortness of breath and wheezing  Cardiovascular: Negative for chest pain, palpitations and leg swelling  Gastrointestinal: Negative for abdominal pain, nausea and vomiting  Endocrine: Negative for polydipsia and polyuria  Genitourinary: Negative for dysuria and hematuria  Musculoskeletal: Positive for arthralgias, gait problem and myalgias  Negative for back pain, joint swelling, neck pain and neck stiffness  As noted in HPI   Skin: Negative for rash and wound  Neurological: Negative for dizziness, numbness and headaches  Psychiatric/Behavioral: Negative for decreased concentration and suicidal ideas  The patient is not nervous/anxious            Objective:      /76 (BP Location: Right arm, Patient Position: Sitting, Cuff Size: Standard)   Temp (!) 97 4 °F (36 3 °C)   Ht 6' 2" (1 88 m)   Wt 82 6 kg (182 lb)   BMI 23 37 kg/m²          Physical Exam        Bilateral lower extremities are neurovascular intact  Toes are pink and mobile  Compartments are soft  Range of motion is bilateral knees are from from 5-115 degrees  There is a negative Lachman's, drawer, and pivot shift test   There is no medial or lateral instability  There is medial joint tenderness, lateral joint tenderness, and patellofemoral crepitation  There is a negative Homans    Neurologically intact distally    Large joint arthrocentesis: bilateral knee  Date/Time: 8/11/2020 8:32 AM  Site marked: site marked  Timeout: Immediately prior to procedure a time out was called to verify the correct patient, procedure, equipment, support staff and site/side marked as required   Supporting Documentation  Indications: pain   Procedure Details  Location: knee - bilateral knee  Needle size: 22 G  Ultrasound guidance: no  Approach: lateral    Medications (Right): 20 mg Sodium Hyaluronate 20 MG/2MLMedications (Left): 20 mg Sodium Hyaluronate 20 MG/2ML   Patient tolerance: patient tolerated the procedure well with no immediate complications  Dressing:  Sterile dressing applied

## 2020-09-01 ENCOUNTER — OFFICE VISIT (OUTPATIENT)
Dept: OBGYN CLINIC | Facility: CLINIC | Age: 65
End: 2020-09-01
Payer: COMMERCIAL

## 2020-09-01 VITALS
BODY MASS INDEX: 24.13 KG/M2 | TEMPERATURE: 98.2 F | WEIGHT: 188 LBS | SYSTOLIC BLOOD PRESSURE: 155 MMHG | HEIGHT: 74 IN | DIASTOLIC BLOOD PRESSURE: 84 MMHG

## 2020-09-01 DIAGNOSIS — M19.011 PRIMARY OSTEOARTHRITIS OF BOTH SHOULDERS: ICD-10-CM

## 2020-09-01 DIAGNOSIS — M19.012 PRIMARY OSTEOARTHRITIS OF BOTH SHOULDERS: ICD-10-CM

## 2020-09-01 DIAGNOSIS — M17.0 PRIMARY OSTEOARTHRITIS OF BOTH KNEES: Primary | ICD-10-CM

## 2020-09-01 PROCEDURE — 99213 OFFICE O/P EST LOW 20 MIN: CPT | Performed by: ORTHOPAEDIC SURGERY

## 2020-09-01 PROCEDURE — 20610 DRAIN/INJ JOINT/BURSA W/O US: CPT | Performed by: PHYSICIAN ASSISTANT

## 2020-09-01 RX ORDER — BUPIVACAINE HYDROCHLORIDE 2.5 MG/ML
4 INJECTION, SOLUTION INFILTRATION; PERINEURAL
Status: COMPLETED | OUTPATIENT
Start: 2020-09-01 | End: 2020-09-01

## 2020-09-01 RX ORDER — METHYLPREDNISOLONE ACETATE 40 MG/ML
2 INJECTION, SUSPENSION INTRA-ARTICULAR; INTRALESIONAL; INTRAMUSCULAR; SOFT TISSUE
Status: COMPLETED | OUTPATIENT
Start: 2020-09-01 | End: 2020-09-01

## 2020-09-01 RX ADMIN — METHYLPREDNISOLONE ACETATE 2 ML: 40 INJECTION, SUSPENSION INTRA-ARTICULAR; INTRALESIONAL; INTRAMUSCULAR; SOFT TISSUE at 15:37

## 2020-09-01 RX ADMIN — BUPIVACAINE HYDROCHLORIDE 4 ML: 2.5 INJECTION, SOLUTION INFILTRATION; PERINEURAL at 15:37

## 2020-09-01 NOTE — PROGRESS NOTES
ASSESSMENT/PLAN:    Diagnoses and all orders for this visit:    Primary osteoarthritis of both knees    Primary osteoarthritis of both shoulders    Other orders  -     Large joint arthrocentesis  -     Large joint arthrocentesis    Both of the patient's shoulders and knees were injected with Depo-Medrol and Marcaine  He tolerated the injections quite well  He will follow up with our office in 3 months  The patient is acceptable to this plan  Return in about 3 months (around 12/1/2020)  _____________________________________________________  CHIEF COMPLAINT:  Chief Complaint   Patient presents with    Left Knee - Follow-up    Right Knee - Follow-up    Left Shoulder - Follow-up    Right Shoulder - Follow-up         SUBJECTIVE:  Susy Liu is a 76 y o  male with a known history of primary osteoarthritis of bilateral knees and shoulders  He presents today complaining of bilateral knee and shoulder pain  He would like corticosteroid injections today  He denies any new trauma or falls  He denies any numbness or tingling  He denies any fever or chills  The following portions of the patient's history were reviewed and updated as appropriate: allergies, current medications, past family history, past medical history, past social history, past surgical history and problem list     PAST MEDICAL HISTORY:  Past Medical History:   Diagnosis Date    Arthritis     Lumbar herniated disc        PAST SURGICAL HISTORY:  Past Surgical History:   Procedure Laterality Date    HERNIA REPAIR  01/03/2005    KNEE ARTHROSCOPY Left 11/30/2005    KNEE ARTHROSCOPY Right 07/20/2005    SHOULDER ARTHROSCOPY Left     in the 1980's    TONSILLECTOMY         FAMILY HISTORY:  History reviewed  No pertinent family history      SOCIAL HISTORY:  Social History     Tobacco Use    Smoking status: Never Smoker    Smokeless tobacco: Never Used   Substance Use Topics    Alcohol use: Not Currently     Comment: quit 2000    Drug use: Not on file       MEDICATIONS:    Current Outpatient Medications:     HYDROcodone-acetaminophen (NORCO) 5-325 mg per tablet, Take 1 tablet by mouth every 6 (six) hours as needed for pain, Disp: , Rfl:     Current Facility-Administered Medications:     Sodium Hyaluronate 20 mg, 20 mg, Intra-articular, , Daniela Mere Odierno, PA-C, 20 mg at 07/31/20 1212    Sodium Hyaluronate 20 mg, 20 mg, Intra-articular, , Archbald Mere Odierno, PA-C, 20 mg at 07/31/20 1212    ALLERGIES:  Allergies   Allergen Reactions    Tetracyclines & Related        ROS:  Review of Systems     Constitutional: Negative for fatigue, fever or loss of apetite  HENT: Negative  Respiratory: Negative for shortness of breath, dyspnea  Cardiovascular: Negative for chest pain/tightness  Gastrointestinal: Negative for abdominal pain, N/V  Endocrine: Negative for cold/heat intolerance, unexplained weight loss/gain  Genitourinary: Negative for flank pain, dysuria, hematuria  Musculoskeletal: Positive for arthralgia   Skin: Negative for rash  Neurological: Negative for numbness or tingling  Psychiatric/Behavioral: Negative for agitation  _____________________________________________________  PHYSICAL EXAMINATION:    Blood pressure 155/84, temperature 98 2 °F (36 8 °C), height 6' 2" (1 88 m), weight 85 3 kg (188 lb)  Constitutional: Oriented to person, place, and time  Appears well-developed and well-nourished  No distress  HENT:   Head: Normocephalic  Eyes: Conjunctivae are normal  Right eye exhibits no discharge  Left eye exhibits no discharge  No scleral icterus  Cardiovascular: Normal rate  Pulmonary/Chest: Effort normal    Neurological: Alert and oriented to person, place, and time  Skin: Skin is warm and dry  No rash noted  Not diaphoretic  No erythema  No pallor  Psychiatric: Normal mood and affect   Behavior is normal  Judgment and thought content normal       MUSCULOSKELETAL EXAMINATION:   Physical Exam  Ortho Exam Bilateral lower extremities are neurovascularly intact  Toes are pink and mobile   Compartments are soft  No warmth, erythema or ecchymosis  ROM of knees are from 5-115 degrees  Negative Lachman, drawer or pivot shift  No medial instability  Medial joint line tenderness, slight lateral joint line tenderness  Patellofemoral crepitation    Neck was soft and supple  Negative axial compression test  Bilateral upper extremities are neurovascularly intact  Fingers are pink and mobile  Compartments are soft  Range of motion of the shoulders are intact  Minimal signs of impingement  Rotator cuff strength is 4/5  Pulses intact    Objective:  BP Readings from Last 1 Encounters:   09/01/20 155/84      Wt Readings from Last 1 Encounters:   09/01/20 85 3 kg (188 lb)        BMI:   Estimated body mass index is 24 14 kg/m² as calculated from the following:    Height as of this encounter: 6' 2" (1 88 m)  Weight as of this encounter: 85 3 kg (188 lb)      PROCEDURES PERFORMED:  Large joint arthrocentesis: bilateral knee  Date/Time: 9/1/2020 3:37 PM  Consent given by: patient  Site marked: site marked  Timeout: Immediately prior to procedure a time out was called to verify the correct patient, procedure, equipment, support staff and site/side marked as required   Supporting Documentation  Indications: pain   Procedure Details  Location: knee - bilateral knee  Needle size: 22 G  Approach: lateral    Medications (Right): 4 mL bupivacaine 0 25 %; 2 mL methylPREDNISolone acetate 40 mg/mLMedications (Left): 4 mL bupivacaine 0 25 %; 2 mL methylPREDNISolone acetate 40 mg/mL   Patient tolerance: patient tolerated the procedure well with no immediate complications  Dressing:  Sterile dressing applied    Large joint arthrocentesis: bilateral subacromial bursa  Date/Time: 9/1/2020 3:37 PM  Consent given by: patient  Site marked: site marked  Timeout: Immediately prior to procedure a time out was called to verify the correct patient, procedure, equipment, support staff and site/side marked as required   Supporting Documentation  Indications: pain   Procedure Details  Location: shoulder - bilateral subacromial bursa  Preparation: Patient was prepped and draped in the usual sterile fashion  Needle size: 22 G  Ultrasound guidance: no  Approach: lateral    Medications (Right): 2 mL methylPREDNISolone acetate 40 mg/mL; 4 mL bupivacaine 0 25 %Medications (Left): 2 mL methylPREDNISolone acetate 40 mg/mL; 4 mL bupivacaine 0 25 %   Patient tolerance: patient tolerated the procedure well with no immediate complications  Dressing:  Sterile dressing applied            Daya Restrepo PA-C

## 2020-09-18 ENCOUNTER — OFFICE VISIT (OUTPATIENT)
Dept: OBGYN CLINIC | Facility: CLINIC | Age: 65
End: 2020-09-18
Payer: COMMERCIAL

## 2020-09-18 VITALS
DIASTOLIC BLOOD PRESSURE: 78 MMHG | HEIGHT: 74 IN | WEIGHT: 188 LBS | TEMPERATURE: 97.4 F | BODY MASS INDEX: 24.13 KG/M2 | SYSTOLIC BLOOD PRESSURE: 126 MMHG

## 2020-09-18 DIAGNOSIS — S61.012A THUMB LACERATION, LEFT, INITIAL ENCOUNTER: Primary | ICD-10-CM

## 2020-09-18 PROCEDURE — 99213 OFFICE O/P EST LOW 20 MIN: CPT | Performed by: ORTHOPAEDIC SURGERY

## 2020-09-18 NOTE — PROGRESS NOTES
ASSESSMENT/PLAN:    Diagnoses and all orders for this visit:    Thumb laceration, left, initial encounter        The patient has a healing left thumb laceration  He was instructed on local wound care  Hopefully, the sensation is thumb will return  He will follow up with our office 2 months  The patient is acceptable this plan  Return in about 2 months (around 11/18/2020)  _____________________________________________________  CHIEF COMPLAINT:  Chief Complaint   Patient presents with    Left Thumb - Pain         SUBJECTIVE:  Miryam Lares is a 76 y o  male who presents to our office with left thumb laceration  The patient states that approximately 1 week ago he was kayaking when he cut himself on something  He was in [place] at the time  He had stitches placed and states that he removed them himself this morning prior to the visit  He also states that his thumb feels numb however he has no issue with range of motion  He denies any fever or chills  The following portions of the patient's history were reviewed and updated as appropriate: allergies, current medications, past family history, past medical history, past social history, past surgical history and problem list     PAST MEDICAL HISTORY:  Past Medical History:   Diagnosis Date    Arthritis     Lumbar herniated disc        PAST SURGICAL HISTORY:  Past Surgical History:   Procedure Laterality Date    HERNIA REPAIR  01/03/2005    KNEE ARTHROSCOPY Left 11/30/2005    KNEE ARTHROSCOPY Right 07/20/2005    SHOULDER ARTHROSCOPY Left     in the 1980's    TONSILLECTOMY         FAMILY HISTORY:  History reviewed  No pertinent family history      SOCIAL HISTORY:  Social History     Tobacco Use    Smoking status: Never Smoker    Smokeless tobacco: Never Used   Substance Use Topics    Alcohol use: Not Currently     Comment: quit 2000    Drug use: Not on file       MEDICATIONS:    Current Outpatient Medications:    HYDROcodone-acetaminophen (NORCO) 5-325 mg per tablet, Take 1 tablet by mouth every 6 (six) hours as needed for pain, Disp: , Rfl:     Current Facility-Administered Medications:     Sodium Hyaluronate 20 mg, 20 mg, Intra-articular, , Iliana Hillman PA-C, 20 mg at 07/31/20 1212    Sodium Hyaluronate 20 mg, 20 mg, Intra-articular, , Iliana Hillman PA-C, 20 mg at 07/31/20 1212    ALLERGIES:  Allergies   Allergen Reactions    Tetracyclines & Related        ROS:  Review of Systems     Constitutional: Negative for fatigue, fever or loss of apetite  HENT: Negative  Respiratory: Negative for shortness of breath, dyspnea  Cardiovascular: Negative for chest pain/tightness  Gastrointestinal: Negative for abdominal pain, N/V  Endocrine: Negative for cold/heat intolerance, unexplained weight loss/gain  Genitourinary: Negative for flank pain, dysuria, hematuria  Musculoskeletal: Positive for arthralgia   Skin: Negative for rash  Neurological: Negative for numbness or tingling  Psychiatric/Behavioral: Negative for agitation  _____________________________________________________  PHYSICAL EXAMINATION:    Blood pressure 126/78, temperature (!) 97 4 °F (36 3 °C), height 6' 2" (1 88 m), weight 85 3 kg (188 lb)  Constitutional: Oriented to person, place, and time  Appears well-developed and well-nourished  No distress  HENT:   Head: Normocephalic  Eyes: Conjunctivae are normal  Right eye exhibits no discharge  Left eye exhibits no discharge  No scleral icterus  Cardiovascular: Normal rate  Pulmonary/Chest: Effort normal    Neurological: Alert and oriented to person, place, and time  Skin: Skin is warm and dry  No rash noted  Not diaphoretic  No erythema  No pallor  Psychiatric: Normal mood and affect   Behavior is normal  Judgment and thought content normal       MUSCULOSKELETAL EXAMINATION:   Physical Exam  Ortho Exam    Left upper extremity is neurovascularly intact  Toes are pink and mobile  Compartments are soft  No warmth, erythema, or ecchymosis present  Sensation is diminished along the thumb  Range of motion is intact  Good cap refill  No signs of infection  Healing laceration  Objective:  BP Readings from Last 1 Encounters:   09/18/20 126/78      Wt Readings from Last 1 Encounters:   09/18/20 85 3 kg (188 lb)        BMI:   Estimated body mass index is 24 14 kg/m² as calculated from the following:    Height as of this encounter: 6' 2" (1 88 m)  Weight as of this encounter: 85 3 kg (188 lb)        Yashira Murphy PA-C

## 2021-01-18 ENCOUNTER — OFFICE VISIT (OUTPATIENT)
Dept: OBGYN CLINIC | Facility: CLINIC | Age: 66
End: 2021-01-18
Payer: COMMERCIAL

## 2021-01-18 VITALS
SYSTOLIC BLOOD PRESSURE: 155 MMHG | HEART RATE: 60 BPM | DIASTOLIC BLOOD PRESSURE: 85 MMHG | HEIGHT: 74 IN | WEIGHT: 188 LBS | BODY MASS INDEX: 24.13 KG/M2

## 2021-01-18 DIAGNOSIS — M17.0 PRIMARY OSTEOARTHRITIS OF BOTH KNEES: Primary | ICD-10-CM

## 2021-01-18 PROCEDURE — 20610 DRAIN/INJ JOINT/BURSA W/O US: CPT | Performed by: ORTHOPAEDIC SURGERY

## 2021-01-18 PROCEDURE — 99213 OFFICE O/P EST LOW 20 MIN: CPT | Performed by: ORTHOPAEDIC SURGERY

## 2021-01-18 RX ORDER — METHYLPREDNISOLONE ACETATE 40 MG/ML
2 INJECTION, SUSPENSION INTRA-ARTICULAR; INTRALESIONAL; INTRAMUSCULAR; SOFT TISSUE
Status: COMPLETED | OUTPATIENT
Start: 2021-01-18 | End: 2021-01-18

## 2021-01-18 RX ORDER — BUPIVACAINE HYDROCHLORIDE 2.5 MG/ML
4 INJECTION, SOLUTION INFILTRATION; PERINEURAL
Status: COMPLETED | OUTPATIENT
Start: 2021-01-18 | End: 2021-01-18

## 2021-01-18 RX ADMIN — BUPIVACAINE HYDROCHLORIDE 4 ML: 2.5 INJECTION, SOLUTION INFILTRATION; PERINEURAL at 11:23

## 2021-01-18 RX ADMIN — METHYLPREDNISOLONE ACETATE 2 ML: 40 INJECTION, SUSPENSION INTRA-ARTICULAR; INTRALESIONAL; INTRAMUSCULAR; SOFT TISSUE at 11:23

## 2021-01-18 NOTE — PROGRESS NOTES
ASSESSMENT/PLAN:    Diagnoses and all orders for this visit:    Primary osteoarthritis of both knees    Other orders  -     Large joint arthrocentesis    Patient received bilateral knee corticosteroid injections today  Risks and benefits of procedure discussed with patient  Patient tolerated the injection well  Patient will follow up in March 2021 for Visco supplementation  Return in about 7 weeks (around 3/8/2021) for Recheck bilateral knees   _____________________________________________________  CHIEF COMPLAINT:  Chief Complaint   Patient presents with    Right Knee - Follow-up    Left Knee - Follow-up         SUBJECTIVE:  Clinton Stevens is a 76 y o  male who presents with bilateral knee pain  Patient has a history of bilateral knee osteoarthritis  Patient had cortiocsteroid injections done last on 9/1/2020  Patient stated that he tolerated the injections well and provided him with mild relief  He denies any numbness or tingling  Patient denies any fevers or chills  The following portions of the patient's history were reviewed and updated as appropriate: allergies, current medications, past family history, past medical history, past social history, past surgical history and problem list     PAST MEDICAL HISTORY:  Past Medical History:   Diagnosis Date    Arthritis     Lumbar herniated disc        PAST SURGICAL HISTORY:  Past Surgical History:   Procedure Laterality Date    HERNIA REPAIR  01/03/2005    KNEE ARTHROSCOPY Left 11/30/2005    KNEE ARTHROSCOPY Right 07/20/2005    SHOULDER ARTHROSCOPY Left     in the 1980's    TONSILLECTOMY         FAMILY HISTORY:  History reviewed  No pertinent family history      SOCIAL HISTORY:  Social History     Tobacco Use    Smoking status: Never Smoker    Smokeless tobacco: Never Used   Substance Use Topics    Alcohol use: Not Currently     Comment: quit 2000    Drug use: Not on file       MEDICATIONS:    Current Outpatient Medications:    HYDROcodone-acetaminophen (NORCO) 5-325 mg per tablet, Take 1 tablet by mouth every 6 (six) hours as needed for pain, Disp: , Rfl:     Current Facility-Administered Medications:     Sodium Hyaluronate 20 mg, 20 mg, Intra-articular, , Tereasa Laura Odierno, PA-C, 20 mg at 07/31/20 1212    Sodium Hyaluronate 20 mg, 20 mg, Intra-articular, , Tereasa Laura Odierno, PA-C, 20 mg at 07/31/20 1212    ALLERGIES:  Allergies   Allergen Reactions    Tetracyclines & Related        ROS:  Review of Systems     Constitutional: Negative for fatigue, fever or loss of apetite  HENT: Negative  Respiratory: Negative for shortness of breath, dyspnea  Cardiovascular: Negative for chest pain/tightness  Gastrointestinal: Negative for abdominal pain, N/V  Endocrine: Negative for cold/heat intolerance, unexplained weight loss/gain  Genitourinary: Negative for flank pain, dysuria, hematuria  Musculoskeletal: Positive for arthralgia   Skin: Negative for rash  Neurological: Negative for numbness or tingling  Psychiatric/Behavioral: Negative for agitation  _____________________________________________________  PHYSICAL EXAMINATION:    Blood pressure 155/85, pulse 60, height 6' 2" (1 88 m), weight 85 3 kg (188 lb)  Constitutional: Oriented to person, place, and time  Appears well-developed and well-nourished  No distress  HENT:   Head: Normocephalic  Eyes: Conjunctivae are normal  Right eye exhibits no discharge  Left eye exhibits no discharge  No scleral icterus  Cardiovascular: Normal rate  Pulmonary/Chest: Effort normal    Neurological: Alert and oriented to person, place, and time  Skin: Skin is warm and dry  No rash noted  Not diaphoretic  No erythema  No pallor  Psychiatric: Normal mood and affect   Behavior is normal  Judgment and thought content normal       MUSCULOSKELETAL EXAMINATION:   Physical Exam  Ortho Exam    Bilateral Knees:   Skin intact  No erythema or ecchymosis present  Negative lauchmans, drawer or pivot shift   No instability  Patellofemoral crepitation  Neurovascularly intact      Objective:  BP Readings from Last 1 Encounters:   01/18/21 155/85      Wt Readings from Last 1 Encounters:   01/18/21 85 3 kg (188 lb)        BMI:   Estimated body mass index is 24 14 kg/m² as calculated from the following:    Height as of this encounter: 6' 2" (1 88 m)  Weight as of this encounter: 85 3 kg (188 lb)  PROCEDURES PERFORMED:  Large joint arthrocentesis: bilateral knee  Universal Protocol:  Consent: Verbal consent obtained  Risks and benefits: risks, benefits and alternatives were discussed  Consent given by: patient  Time out: Immediately prior to procedure a "time out" was called to verify the correct patient, procedure, equipment, support staff and site/side marked as required    Timeout called at: 1/18/2021 11:18 AM   Patient understanding: patient states understanding of the procedure being performed  Site marked: the operative site was marked  Patient identity confirmed: verbally with patient    Supporting Documentation  Indications: pain   Procedure Details  Location: knee - bilateral knee  Preparation: Patient was prepped and draped in the usual sterile fashion  Needle size: 22 G  Ultrasound guidance: no  Approach: anterolateral    Medications (Right): 4 mL bupivacaine 0 25 %; 2 mL methylPREDNISolone acetate 40 mg/mLMedications (Left): 4 mL bupivacaine 0 25 %; 2 mL methylPREDNISolone acetate 40 mg/mL   Patient tolerance: patient tolerated the procedure well with no immediate complications  Dressing:  Sterile dressing applied            Scribe Attestation    I,:  Dasha Peña am acting as a scribe while in the presence of the attending physician :       I,:  Harvey Gonzalez DO personally performed the services described in this documentation    as scribed in my presence :

## 2021-03-05 ENCOUNTER — LAB (OUTPATIENT)
Dept: LAB | Facility: CLINIC | Age: 66
End: 2021-03-05
Payer: COMMERCIAL

## 2021-03-05 ENCOUNTER — TRANSCRIBE ORDERS (OUTPATIENT)
Dept: URGENT CARE | Facility: CLINIC | Age: 66
End: 2021-03-05

## 2021-03-05 DIAGNOSIS — E55.9 AVITAMINOSIS D: ICD-10-CM

## 2021-03-05 DIAGNOSIS — N42.9 DISEASE OF PROSTATE: ICD-10-CM

## 2021-03-05 DIAGNOSIS — Z79.899 ENCOUNTER FOR LONG-TERM (CURRENT) USE OF OTHER MEDICATIONS: ICD-10-CM

## 2021-03-05 DIAGNOSIS — Z79.899 ENCOUNTER FOR LONG-TERM (CURRENT) USE OF OTHER MEDICATIONS: Primary | ICD-10-CM

## 2021-03-05 DIAGNOSIS — E11.9 DIABETES MELLITUS WITHOUT COMPLICATION (HCC): ICD-10-CM

## 2021-03-05 LAB
25(OH)D3 SERPL-MCNC: 27.8 NG/ML (ref 30–100)
ALBUMIN SERPL BCP-MCNC: 4.4 G/DL (ref 3.5–5)
ALP SERPL-CCNC: 61 U/L (ref 46–116)
ALT SERPL W P-5'-P-CCNC: 16 U/L (ref 12–78)
ANION GAP SERPL CALCULATED.3IONS-SCNC: 4 MMOL/L (ref 4–13)
AST SERPL W P-5'-P-CCNC: 22 U/L (ref 5–45)
BILIRUB SERPL-MCNC: 0.7 MG/DL (ref 0.2–1)
BUN SERPL-MCNC: 21 MG/DL (ref 5–25)
CALCIUM SERPL-MCNC: 9.1 MG/DL (ref 8.3–10.1)
CHLORIDE SERPL-SCNC: 108 MMOL/L (ref 100–108)
CHOLEST SERPL-MCNC: 179 MG/DL (ref 50–200)
CO2 SERPL-SCNC: 29 MMOL/L (ref 21–32)
CREAT SERPL-MCNC: 1.13 MG/DL (ref 0.6–1.3)
ERYTHROCYTE [DISTWIDTH] IN BLOOD BY AUTOMATED COUNT: 12.9 % (ref 11.6–15.1)
EST. AVERAGE GLUCOSE BLD GHB EST-MCNC: 111 MG/DL
GFR SERPL CREATININE-BSD FRML MDRD: 67 ML/MIN/1.73SQ M
GLUCOSE P FAST SERPL-MCNC: 92 MG/DL (ref 65–99)
HBA1C MFR BLD: 5.5 %
HCT VFR BLD AUTO: 45.6 % (ref 36.5–49.3)
HDLC SERPL-MCNC: 42 MG/DL
HGB BLD-MCNC: 15.3 G/DL (ref 12–17)
LDLC SERPL CALC-MCNC: 120 MG/DL (ref 0–100)
MCH RBC QN AUTO: 31.7 PG (ref 26.8–34.3)
MCHC RBC AUTO-ENTMCNC: 33.6 G/DL (ref 31.4–37.4)
MCV RBC AUTO: 94 FL (ref 82–98)
NONHDLC SERPL-MCNC: 137 MG/DL
PLATELET # BLD AUTO: 186 THOUSANDS/UL (ref 149–390)
PMV BLD AUTO: 9.5 FL (ref 8.9–12.7)
POTASSIUM SERPL-SCNC: 4.2 MMOL/L (ref 3.5–5.3)
PROT SERPL-MCNC: 7.2 G/DL (ref 6.4–8.2)
PSA SERPL-MCNC: 0.3 NG/ML (ref 0–4)
RBC # BLD AUTO: 4.83 MILLION/UL (ref 3.88–5.62)
SODIUM SERPL-SCNC: 141 MMOL/L (ref 136–145)
T4 FREE SERPL-MCNC: 0.88 NG/DL (ref 0.76–1.46)
TRIGL SERPL-MCNC: 87 MG/DL
TSH SERPL DL<=0.05 MIU/L-ACNC: 2.75 UIU/ML (ref 0.36–3.74)
WBC # BLD AUTO: 11.02 THOUSAND/UL (ref 4.31–10.16)

## 2021-03-05 PROCEDURE — G0103 PSA SCREENING: HCPCS

## 2021-03-05 PROCEDURE — 85027 COMPLETE CBC AUTOMATED: CPT

## 2021-03-05 PROCEDURE — 84443 ASSAY THYROID STIM HORMONE: CPT

## 2021-03-05 PROCEDURE — 80053 COMPREHEN METABOLIC PANEL: CPT

## 2021-03-05 PROCEDURE — 84439 ASSAY OF FREE THYROXINE: CPT

## 2021-03-05 PROCEDURE — 36415 COLL VENOUS BLD VENIPUNCTURE: CPT

## 2021-03-05 PROCEDURE — 80061 LIPID PANEL: CPT

## 2021-03-05 PROCEDURE — 83036 HEMOGLOBIN GLYCOSYLATED A1C: CPT

## 2021-03-05 PROCEDURE — 82306 VITAMIN D 25 HYDROXY: CPT

## 2021-04-19 ENCOUNTER — OFFICE VISIT (OUTPATIENT)
Dept: OBGYN CLINIC | Facility: CLINIC | Age: 66
End: 2021-04-19
Payer: MEDICARE

## 2021-04-19 VITALS — HEIGHT: 74 IN | BODY MASS INDEX: 24.13 KG/M2 | WEIGHT: 188 LBS

## 2021-04-19 DIAGNOSIS — M19.011 PRIMARY OSTEOARTHRITIS OF BOTH SHOULDERS: Primary | ICD-10-CM

## 2021-04-19 DIAGNOSIS — M70.61 TROCHANTERIC BURSITIS OF RIGHT HIP: ICD-10-CM

## 2021-04-19 DIAGNOSIS — M19.012 PRIMARY OSTEOARTHRITIS OF BOTH SHOULDERS: Primary | ICD-10-CM

## 2021-04-19 DIAGNOSIS — M17.0 PRIMARY OSTEOARTHRITIS OF BOTH KNEES: ICD-10-CM

## 2021-04-19 PROCEDURE — 99213 OFFICE O/P EST LOW 20 MIN: CPT | Performed by: ORTHOPAEDIC SURGERY

## 2021-04-19 PROCEDURE — 20610 DRAIN/INJ JOINT/BURSA W/O US: CPT | Performed by: ORTHOPAEDIC SURGERY

## 2021-04-19 RX ORDER — BUPIVACAINE HYDROCHLORIDE 2.5 MG/ML
4 INJECTION, SOLUTION INFILTRATION; PERINEURAL
Status: COMPLETED | OUTPATIENT
Start: 2021-04-19 | End: 2021-04-19

## 2021-04-19 RX ORDER — METHYLPREDNISOLONE ACETATE 40 MG/ML
2 INJECTION, SUSPENSION INTRA-ARTICULAR; INTRALESIONAL; INTRAMUSCULAR; SOFT TISSUE
Status: COMPLETED | OUTPATIENT
Start: 2021-04-19 | End: 2021-04-19

## 2021-04-19 RX ADMIN — METHYLPREDNISOLONE ACETATE 2 ML: 40 INJECTION, SUSPENSION INTRA-ARTICULAR; INTRALESIONAL; INTRAMUSCULAR; SOFT TISSUE at 11:49

## 2021-04-19 RX ADMIN — BUPIVACAINE HYDROCHLORIDE 4 ML: 2.5 INJECTION, SOLUTION INFILTRATION; PERINEURAL at 11:49

## 2021-04-19 NOTE — PROGRESS NOTES
Assessment/Plan:  Assessment/Plan   Diagnoses and all orders for this visit:    Primary osteoarthritis of both shoulders  -     Large joint arthrocentesis  -     Large joint arthrocentesis    Primary osteoarthritis of both knees  -     Injection procedure prior authorization; Future    Trochanteric bursitis of right hip  -     Large joint arthrocentesis        Patient was offered, and excepted, bilateral Depo Medrol and Marcaine injections to the shoulders relief of pain and inflammation  He was also provided with Marcaine and Depo-Medrol injection to the right trochanteric bursa for relief of pain and inflammation  Patient tolerated all treatments well  He can resume activity as tolerated without limitations or restrictions  Secondarily, prior authorization for repeat viscosupplementation treatment series has been requested  Patient will be contacted when injections have been obtained in the office to schedule an appointment for initiation  Patient is amenable to this plan  Both shoulders and right hip were injected with Depo-Medrol and Marcaine  He tolerated procedures quite well  Return back once she is approved for viscosupplementation to his bilateral knees  Subjective:   Patient ID: Basilia Cerna is a 76 y o  male  HPI   patient presents for follow-up evaluation of bilateral glenohumeral osteoarthritis and trochanteric bursitis of right hip  Patient states that he had significant symptom resolution following previous injections in September 2020  He recently participated in a competitive Ki acting race, states that he had exacerbation of his bilateral shoulders and right hip pain  He denies any associated bruising, swelling, numbness, tingling, or feelings of instability      The following portions of the patient's history were reviewed and updated as appropriate: allergies, current medications, past family history, past medical history, past social history, past surgical history and problem list     Past Medical History:   Diagnosis Date    Arthritis     Lumbar herniated disc      Past Surgical History:   Procedure Laterality Date    HERNIA REPAIR  01/03/2005    KNEE ARTHROSCOPY Left 11/30/2005    KNEE ARTHROSCOPY Right 07/20/2005    SHOULDER ARTHROSCOPY Left     in the 1980's    TONSILLECTOMY       History reviewed  No pertinent family history      Social History     Socioeconomic History    Marital status: /Civil Union     Spouse name: None    Number of children: None    Years of education: None    Highest education level: None   Occupational History    None   Social Needs    Financial resource strain: None    Food insecurity     Worry: None     Inability: None    Transportation needs     Medical: None     Non-medical: None   Tobacco Use    Smoking status: Never Smoker    Smokeless tobacco: Never Used   Substance and Sexual Activity    Alcohol use: Not Currently     Comment: quit 2000    Drug use: None    Sexual activity: None   Lifestyle    Physical activity     Days per week: None     Minutes per session: None    Stress: None   Relationships    Social connections     Talks on phone: None     Gets together: None     Attends Mandaeism service: None     Active member of club or organization: None     Attends meetings of clubs or organizations: None     Relationship status: None    Intimate partner violence     Fear of current or ex partner: None     Emotionally abused: None     Physically abused: None     Forced sexual activity: None   Other Topics Concern    None   Social History Narrative    None       Current Outpatient Medications:     HYDROcodone-acetaminophen (NORCO) 5-325 mg per tablet, Take 1 tablet by mouth every 6 (six) hours as needed for pain, Disp: , Rfl:     Current Facility-Administered Medications:     Sodium Hyaluronate 20 mg, 20 mg, Intra-articular, , Iliana Hillman PA-C, 20 mg at 07/31/20 1212    Sodium Hyaluronate 20 mg, 20 mg, Intra-articular, , Robles Krishnamurthy PA-C, 20 mg at 07/31/20 1212    Allergies   Allergen Reactions    Tetracyclines & Related        Review of Systems   Constitutional: Negative for chills, fever and unexpected weight change  HENT: Negative for hearing loss, nosebleeds and sore throat  Eyes: Negative for pain, redness and visual disturbance  Respiratory: Negative for cough, shortness of breath and wheezing  Cardiovascular: Negative for chest pain, palpitations and leg swelling  Gastrointestinal: Negative for abdominal pain, nausea and vomiting  Endocrine: Negative for polydipsia and polyuria  Genitourinary: Negative for dysuria and hematuria  Musculoskeletal:        As noted in HPI   Skin: Negative for rash and wound  Neurological: Negative for dizziness, numbness and headaches  Psychiatric/Behavioral: Negative for decreased concentration and suicidal ideas  The patient is not nervous/anxious          Objective:  Ht 6' 2" (1 88 m)   Wt 85 3 kg (188 lb)   BMI 24 14 kg/m²     Ortho Exam   Bilateral Shoulders -     no anatomical deformity  Skin is warm and dry to touch with no signs of erythema, ecchymosis, or infection  no soft tissue swelling or effusion noted   minimal palpable crepitus with passive motion  TTP   Subacromial bursa, TTP over posterior capsule  ROM  FF 0°- 175°, ABD  0°- 175°, ER  0°-90°, IR  L2  MMT  5/5 throughout   no glenohumeral instability appreciated on exam  Demonstrates normal elbow, wrist, and finger motion  2+ distal radial pulse with brisk capillary refill to the fingers  Radial, median, and ulnar motor and sensory distributions intact  Sensation light touch intact distally      Right Hip -    no obvious anatomical deformity  Skin is warm dry to touch with no signs of erythema, ecchymosis, or infection   No soft tissue swelling or effusion noted  Smooth passive motion on exam   TTP over trochanteric bursa  5/5 MMT throughout  - PANFILO, -  FADIR   demonstrates normal knee, ankle, and toe range of motion   2+ TP and DP pulses with brisk capillary refill to the toes   Sural, saphenous, tibial, superficial and deep peroneal motor and sensory distributions intact  Sensation light touch intact distally    Physical Exam  Constitutional:       Appearance: He is well-developed  HENT:      Right Ear: External ear normal       Left Ear: External ear normal       Nose: Nose normal    Eyes:      Conjunctiva/sclera: Conjunctivae normal       Pupils: Pupils are equal, round, and reactive to light  Neck:      Musculoskeletal: Normal range of motion  Pulmonary:      Effort: Pulmonary effort is normal    Musculoskeletal:      Comments:  As noted in HPI   Skin:     General: Skin is warm and dry  Neurological:      Mental Status: He is alert and oriented to person, place, and time  Psychiatric:         Behavior: Behavior normal          Thought Content: Thought content normal          Judgment: Judgment normal          Imaging:  No new imaging reviewed this visit    Large joint arthrocentesis: R subacromial bursa  [place] Protocol:  Procedure performed by: ROSSANA Caputo ATC)  Consent: Verbal consent obtained  Risks and benefits: risks, benefits and alternatives were discussed  Consent given by: patient  Time out: Immediately prior to procedure a "time out" was called to verify the correct patient, procedure, equipment, support staff and site/side marked as required    Timeout called at: 4/19/2021 11:37 AM   Patient understanding: patient states understanding of the procedure being performed  Site marked: the operative site was marked  Patient identity confirmed: verbally with patient    Supporting Documentation  Indications: pain and joint swelling   Procedure Details  Location: shoulder - R subacromial bursa  Preparation: Patient was prepped and draped in the usual sterile fashion  Needle size: 22 G  Ultrasound guidance: no  Approach: lateral  Medications administered: 4 mL bupivacaine 0 25 %; 2 mL methylPREDNISolone acetate 40 mg/mL    Patient tolerance: patient tolerated the procedure well with no immediate complications  Dressing:  Sterile dressing applied    Large joint arthrocentesis: L subacromial bursa  [place] Protocol:  Procedure performed by: ROSSANA Mejía ATC)  Consent: Verbal consent obtained  Risks and benefits: risks, benefits and alternatives were discussed  Consent given by: patient  Time out: Immediately prior to procedure a "time out" was called to verify the correct patient, procedure, equipment, support staff and site/side marked as required  Timeout called at: 4/19/2021 11:37 AM   Patient understanding: patient states understanding of the procedure being performed  Site marked: the operative site was marked  Patient identity confirmed: verbally with patient    Supporting Documentation  Indications: pain and joint swelling   Procedure Details  Location: shoulder - L subacromial bursa  Preparation: Patient was prepped and draped in the usual sterile fashion  Needle size: 22 G  Ultrasound guidance: no  Approach: lateral  Medications administered: 4 mL bupivacaine 0 25 %; 2 mL methylPREDNISolone acetate 40 mg/mL    Patient tolerance: patient tolerated the procedure well with no immediate complications  Dressing:  Sterile dressing applied    Large joint arthrocentesis: R greater trochanteric bursa  [facility] Protocol:  Procedure performed by: ROSSANA Mejía ATC)  Consent: Verbal consent obtained  Risks and benefits: risks, benefits and alternatives were discussed  Consent given by: patient  Time out: Immediately prior to procedure a "time out" was called to verify the correct patient, procedure, equipment, support staff and site/side marked as required    Timeout called at: 4/19/2021 11:37 AM   Patient understanding: patient states understanding of the procedure being performed  Site marked: the operative site was marked  Patient identity confirmed: verbally with patient    Supporting Documentation  Indications: pain and joint swelling   Procedure Details  Location: hip - R greater trochanteric bursa  Preparation: Patient was prepped and draped in the usual sterile fashion  Needle size: 22 G  Ultrasound guidance: no  Approach: lateral  Medications administered: 4 mL bupivacaine 0 25 %; 2 mL methylPREDNISolone acetate 40 mg/mL    Patient tolerance: patient tolerated the procedure well with no immediate complications  Dressing:  Sterile dressing applied        Scribe Attestation    I,:  Mustapha Houston am acting as a scribe while in the presence of the attending physician :       I,:  Pastor Garnica, DO personally performed the services described in this documentation    as scribed in my presence :

## 2021-04-26 ENCOUNTER — OFFICE VISIT (OUTPATIENT)
Dept: OBGYN CLINIC | Facility: CLINIC | Age: 66
End: 2021-04-26
Payer: MEDICARE

## 2021-04-26 VITALS — BODY MASS INDEX: 24.13 KG/M2 | WEIGHT: 188 LBS | HEIGHT: 74 IN

## 2021-04-26 DIAGNOSIS — M17.0 PRIMARY OSTEOARTHRITIS OF BOTH KNEES: Primary | ICD-10-CM

## 2021-04-26 PROCEDURE — 20610 DRAIN/INJ JOINT/BURSA W/O US: CPT | Performed by: ORTHOPAEDIC SURGERY

## 2021-04-26 RX ORDER — HYALURONATE SODIUM 10 MG/ML
20 SYRINGE (ML) INTRAARTICULAR
Status: COMPLETED | OUTPATIENT
Start: 2021-04-26 | End: 2021-04-26

## 2021-04-26 RX ADMIN — Medication 20 MG: at 11:09

## 2021-04-26 NOTE — PROGRESS NOTES
Assessment/Plan:  Assessment/Plan   Diagnoses and all orders for this visit:    Primary osteoarthritis of both knees  -     Large joint arthrocentesis  -     Large joint arthrocentesis        Patient was provided with 1st of 5 shot Euflexxa viscosupplementation injection series for relief of pain and inflammation of primary osteoarthritis of the bilateral knees  Patient tolerated treatment well  He will be seen for follow-up in 1 week for 2nd of 5 shot series  Patient is amenable to this plan  both knees were injected with his 1st set of Euflexxa  He tolerated procedure quite well  Return back next week for 2nd set of injections    Subjective:   Patient ID: Carlos Stringer is a 76 y o  male  HPI     Patient presents for follow-up evaluation,  and initiation of Euflexxa viscosupplementation injections for primary osteoarthritis of the bilateral knees  Patient states that he has continued medial knee pain and soreness with activity  He reports occasional swelling  He denies any recent bruising, numbness, tingling, or feelings of instability  The following portions of the patient's history were reviewed and updated as appropriate: allergies, current medications, past family history, past medical history, past social history, past surgical history and problem list     Past Medical History:   Diagnosis Date    Arthritis     Lumbar herniated disc      Past Surgical History:   Procedure Laterality Date    HERNIA REPAIR  01/03/2005    KNEE ARTHROSCOPY Left 11/30/2005    KNEE ARTHROSCOPY Right 07/20/2005    SHOULDER ARTHROSCOPY Left     in the 1980's    TONSILLECTOMY       History reviewed  No pertinent family history      Social History     Socioeconomic History    Marital status: /Civil Union     Spouse name: None    Number of children: None    Years of education: None    Highest education level: None   Occupational History    None   Social Needs    Financial resource strain: None    Food insecurity     Worry: None     Inability: None    Transportation needs     Medical: None     Non-medical: None   Tobacco Use    Smoking status: Never Smoker    Smokeless tobacco: Never Used   Substance and Sexual Activity    Alcohol use: Not Currently     Comment: quit 2000    Drug use: None    Sexual activity: None   Lifestyle    Physical activity     Days per week: None     Minutes per session: None    Stress: None   Relationships    Social connections     Talks on phone: None     Gets together: None     Attends Taoism service: None     Active member of club or organization: None     Attends meetings of clubs or organizations: None     Relationship status: None    Intimate partner violence     Fear of current or ex partner: None     Emotionally abused: None     Physically abused: None     Forced sexual activity: None   Other Topics Concern    None   Social History Narrative    None       Current Outpatient Medications:     HYDROcodone-acetaminophen (NORCO) 5-325 mg per tablet, Take 1 tablet by mouth every 6 (six) hours as needed for pain, Disp: , Rfl:     Current Facility-Administered Medications:     Sodium Hyaluronate 20 mg, 20 mg, Intra-articular, , Welford Murphy Odierno, PA-C, 20 mg at 07/31/20 1212    Sodium Hyaluronate 20 mg, 20 mg, Intra-articular, , Welford Murphy Odierno, PA-C, 20 mg at 07/31/20 1212    Allergies   Allergen Reactions    Tetracyclines & Related        Review of Systems   Constitutional: Negative for chills, fever and unexpected weight change  HENT: Negative for hearing loss, nosebleeds and sore throat  Eyes: Negative for pain, redness and visual disturbance  Respiratory: Negative for cough, shortness of breath and wheezing  Cardiovascular: Negative for chest pain, palpitations and leg swelling  Gastrointestinal: Negative for abdominal pain, nausea and vomiting  Endocrine: Negative for polydipsia and polyuria  Genitourinary: Negative for dysuria and hematuria  Musculoskeletal:        As noted in HPI   Skin: Negative for rash and wound  Neurological: Negative for dizziness, numbness and headaches  Psychiatric/Behavioral: Negative for decreased concentration and suicidal ideas  The patient is not nervous/anxious  Objective:  Ht 6' 2" (1 88 m)   Wt 85 3 kg (188 lb)   BMI 24 14 kg/m²     Ortho Exam     Bilateral knees -    Patient ambulates with  normal gait pattern  Uses  no assistive device  no anatomical deformity  Skin is warm and dry to touch with no signs of erythema, ecchymosis, infection  no soft tissue swelling or effusion noted  ROM  0°- 125°  TTP over  Medial joint line, TTP over lateral joint line  Flexor and extensor mechanisms intact  Knee is stable to varus and valgus stress  - Lachman's  - Anterior Drawer, - Posterior Drawer  - medial Tony's, - lateral Tony's  - Pivot Shift  Patella tracks centrally  with palpable crepitus  Calf compartments are soft and supple  2+ TP and DP pulses with brisk capillary refill to the toes  Sural, saphenous, tibial, superficial and deep peroneal motor and sensory distributions intact  Sensation light touch intact distally    Physical Exam  Constitutional:       Appearance: He is well-developed  HENT:      Right Ear: External ear normal       Left Ear: External ear normal       Nose: Nose normal    Eyes:      Conjunctiva/sclera: Conjunctivae normal       Pupils: Pupils are equal, round, and reactive to light  Neck:      Musculoskeletal: Normal range of motion  Pulmonary:      Effort: Pulmonary effort is normal    Musculoskeletal:      Comments:  As noted in HPI   Skin:     General: Skin is warm and dry  Neurological:      Mental Status: He is alert and oriented to person, place, and time  Psychiatric:         Behavior: Behavior normal          Thought Content:  Thought content normal          Judgment: Judgment normal          Imaging:  No new imaging reviewed this visit     Large joint arthrocentesis: R knee  [facility] Protocol:  Procedure performed by: ROSSANA Miller ATC)  Consent: Verbal consent obtained  Risks and benefits: risks, benefits and alternatives were discussed  Consent given by: patient  Time out: Immediately prior to procedure a "time out" was called to verify the correct patient, procedure, equipment, support staff and site/side marked as required  Timeout called at: 4/26/2021 11:03 AM   Patient understanding: patient states understanding of the procedure being performed  Site marked: the operative site was marked  Patient identity confirmed: verbally with patient    Supporting Documentation  Indications: pain and joint swelling   Procedure Details  Location: knee - R knee  Preparation: Patient was prepped and draped in the usual sterile fashion  Needle size: 22 G  Ultrasound guidance: no  Approach: anterolateral  Medications administered: 20 mg Sodium Hyaluronate 20 MG/2ML    Patient tolerance: patient tolerated the procedure well with no immediate complications  Dressing:  Sterile dressing applied    Large joint arthrocentesis: L knee  [facility] Protocol:  Procedure performed by: ROSSANA Miller ATC)  Consent: Verbal consent obtained  Risks and benefits: risks, benefits and alternatives were discussed  Consent given by: patient  Time out: Immediately prior to procedure a "time out" was called to verify the correct patient, procedure, equipment, support staff and site/side marked as required    Timeout called at: 4/26/2021 11:03 AM   Patient understanding: patient states understanding of the procedure being performed  Site marked: the operative site was marked  Patient identity confirmed: verbally with patient    Supporting Documentation  Indications: pain and joint swelling   Procedure Details  Location: knee - L knee  Preparation: Patient was prepped and draped in the usual sterile fashion  Needle size: 22 G  Ultrasound guidance: no  Approach: anterolateral  Medications administered: 20 mg Sodium Hyaluronate 20 MG/2ML    Patient tolerance: patient tolerated the procedure well with no immediate complications  Dressing:  Sterile dressing applied        Scribe Attestation    I,:  Audrey Powell am acting as a scribe while in the presence of the attending physician :       I,:  Jerome Novak DO personally performed the services described in this documentation    as scribed in my presence :

## 2021-05-03 ENCOUNTER — OFFICE VISIT (OUTPATIENT)
Dept: OBGYN CLINIC | Facility: CLINIC | Age: 66
End: 2021-05-03
Payer: MEDICARE

## 2021-05-03 VITALS
HEART RATE: 87 BPM | HEIGHT: 74 IN | BODY MASS INDEX: 24.13 KG/M2 | SYSTOLIC BLOOD PRESSURE: 161 MMHG | WEIGHT: 188 LBS | DIASTOLIC BLOOD PRESSURE: 99 MMHG

## 2021-05-03 DIAGNOSIS — M17.0 PRIMARY OSTEOARTHRITIS OF BOTH KNEES: Primary | ICD-10-CM

## 2021-05-03 PROCEDURE — 20610 DRAIN/INJ JOINT/BURSA W/O US: CPT | Performed by: ORTHOPAEDIC SURGERY

## 2021-05-03 RX ORDER — HYALURONATE SODIUM 10 MG/ML
20 SYRINGE (ML) INTRAARTICULAR
Status: COMPLETED | OUTPATIENT
Start: 2021-05-03 | End: 2021-05-03

## 2021-05-03 RX ADMIN — Medication 20 MG: at 11:54

## 2021-05-03 NOTE — PROGRESS NOTES
Assessment/Plan:  Assessment/Plan   Diagnoses and all orders for this visit:    Primary osteoarthritis of both knees  -     Large joint arthrocentesis  -     Large joint arthrocentesis        Patient was provided with 2nd of 3 shot Euflexxa viscosupplementation injection series for primary osteoarthritis of the bilateral knees  Patient tolerated treatment well  He will be seen for follow-up in 1 week for 3rd of 3 shot series for the bilateral knees  Patient is amenable to this plan  It was erroneously documented on 4/26/2021 that Euflexxa is a 5 shot series, when it is only in fact a 3 shot series  This error is corrected in this note, and will be noted appropriately moving forward  both knees were injected with his 2nd set of Euflexxa  He tolerated procedure quite well  Return back next week for his last set of injection    Subjective:   Patient ID: Skip Baez is a 76 y o  male  HPI    Patient presents for 2nd of 3 shot Euflexxa viscosupplementation injection series for primary osteoarthritis of the bilateral knees  Patient denies any recent fever, chills, headache, nausea, dizziness, malaise, or any other adverse reactions to previous injections  Patient states that he has continued medial knee pain and soreness with activity  He denies any recent onset bruising, swelling, numbness, tingling, or feelings of instability  The following portions of the patient's history were reviewed and updated as appropriate: allergies, current medications, past family history, past medical history, past social history, past surgical history and problem list     Past Medical History:   Diagnosis Date    Arthritis     Lumbar herniated disc      Past Surgical History:   Procedure Laterality Date    HERNIA REPAIR  01/03/2005    KNEE ARTHROSCOPY Left 11/30/2005    KNEE ARTHROSCOPY Right 07/20/2005    SHOULDER ARTHROSCOPY Left     in the 1980's    TONSILLECTOMY       History reviewed   No pertinent family history  Social History     Socioeconomic History    Marital status: /Civil Union     Spouse name: None    Number of children: None    Years of education: None    Highest education level: None   Occupational History    None   Social Needs    Financial resource strain: None    Food insecurity     Worry: None     Inability: None    Transportation needs     Medical: None     Non-medical: None   Tobacco Use    Smoking status: Never Smoker    Smokeless tobacco: Never Used   Substance and Sexual Activity    Alcohol use: Not Currently     Comment: quit 2000    Drug use: None    Sexual activity: None   Lifestyle    Physical activity     Days per week: None     Minutes per session: None    Stress: None   Relationships    Social connections     Talks on phone: None     Gets together: None     Attends Gnosticism service: None     Active member of club or organization: None     Attends meetings of clubs or organizations: None     Relationship status: None    Intimate partner violence     Fear of current or ex partner: None     Emotionally abused: None     Physically abused: None     Forced sexual activity: None   Other Topics Concern    None   Social History Narrative    None       Current Outpatient Medications:     HYDROcodone-acetaminophen (NORCO) 5-325 mg per tablet, Take 1 tablet by mouth every 6 (six) hours as needed for pain, Disp: , Rfl:     Current Facility-Administered Medications:     Sodium Hyaluronate 20 mg, 20 mg, Intra-articular, , Nicholas Timken Odierno, PA-C, 20 mg at 07/31/20 1212    Sodium Hyaluronate 20 mg, 20 mg, Intra-articular, , Colt Slade Odierno, PA-C, 20 mg at 07/31/20 1212    Allergies   Allergen Reactions    Tetracyclines & Related        Review of Systems   Constitutional: Negative for chills, fever and unexpected weight change  HENT: Negative for hearing loss, nosebleeds and sore throat  Eyes: Negative for pain, redness and visual disturbance     Respiratory: Negative for cough, shortness of breath and wheezing  Cardiovascular: Negative for chest pain, palpitations and leg swelling  Gastrointestinal: Negative for abdominal pain, nausea and vomiting  Endocrine: Negative for polydipsia and polyuria  Genitourinary: Negative for dysuria and hematuria  Musculoskeletal:        As noted in HPI   Skin: Negative for rash and wound  Neurological: Negative for dizziness, numbness and headaches  Psychiatric/Behavioral: Negative for decreased concentration and suicidal ideas  The patient is not nervous/anxious  Objective:  /99 (BP Location: Left arm, Patient Position: Sitting, Cuff Size: Standard)   Pulse 87   Ht 6' 2" (1 88 m)   Wt 85 3 kg (188 lb)   BMI 24 14 kg/m²     Ortho Exam     Bilateral knees -    Patient ambulates with  normal gait pattern  Uses no assistive device  no anatomical deformity  Skin is warm and dry to touch with no signs of erythema, ecchymosis, infection  no soft tissue swelling or effusion noted  ROM  0 degrees-  125 degrees  TTP over  Medial joint line,  Mildly TTP over lateral joint line  Flexor and extensor mechanisms intact  Knee is stable to varus and valgus stress  - Lachman's  - Anterior Drawer, - Posterior Drawer  - medial Tony's, - lateral Tony's  - Pivot Shift  Patella tracks centrally  with palpable crepitus  Calf compartments are soft and supple  2+ TP and DP pulses with brisk capillary refill to the toes  Sural, saphenous, tibial, superficial and deep peroneal motor and sensory distributions intact  Sensation light touch intact distally    Physical Exam  Constitutional:       Appearance: He is well-developed  HENT:      Right Ear: External ear normal       Left Ear: External ear normal       Nose: Nose normal    Eyes:      Conjunctiva/sclera: Conjunctivae normal       Pupils: Pupils are equal, round, and reactive to light  Neck:      Musculoskeletal: Normal range of motion     Pulmonary:      Effort: Pulmonary effort is normal    Musculoskeletal:      Comments:  As noted in HPI   Skin:     General: Skin is warm and dry  Neurological:      Mental Status: He is alert and oriented to person, place, and time  Psychiatric:         Behavior: Behavior normal          Thought Content: Thought content normal          Judgment: Judgment normal        Imaging:   no new imaging reviewed this visit      Large joint arthrocentesis: R knee  Thompson Protocol:  Procedure performed by: ROSSANA Weathers ATC)  Consent: Verbal consent obtained  Risks and benefits: risks, benefits and alternatives were discussed  Consent given by: patient  Time out: Immediately prior to procedure a "time out" was called to verify the correct patient, procedure, equipment, support staff and site/side marked as required  Timeout called at: 5/3/2021 11:46 AM   Patient understanding: patient states understanding of the procedure being performed  Site marked: the operative site was marked  Patient identity confirmed: verbally with patient    Supporting Documentation  Indications: pain and joint swelling   Procedure Details  Location: knee - R knee  Preparation: Patient was prepped and draped in the usual sterile fashion  Needle size: 22 G  Ultrasound guidance: no  Approach: anterolateral  Medications administered: 20 mg Sodium Hyaluronate 20 MG/2ML    Patient tolerance: patient tolerated the procedure well with no immediate complications  Dressing:  Sterile dressing applied    Large joint arthrocentesis: L knee  Universal Protocol:  Procedure performed by: ROSSANA Weathers ATC)  Consent: Verbal consent obtained  Risks and benefits: risks, benefits and alternatives were discussed  Consent given by: patient  Time out: Immediately prior to procedure a "time out" was called to verify the correct patient, procedure, equipment, support staff and site/side marked as required    Timeout called at: 5/3/2021 11:47 AM   Patient understanding: patient states understanding of the procedure being performed  Site marked: the operative site was marked  Patient identity confirmed: verbally with patient    Supporting Documentation  Indications: pain and joint swelling   Procedure Details  Location: knee - L knee  Preparation: Patient was prepped and draped in the usual sterile fashion  Needle size: 22 G  Ultrasound guidance: no  Approach: anterolateral  Medications administered: 20 mg Sodium Hyaluronate 20 MG/2ML    Patient tolerance: patient tolerated the procedure well with no immediate complications  Dressing:  Sterile dressing applied        Scribe Attestation    I,:  Ramonita Perkins am acting as a scribe while in the presence of the attending physician :       I,:  Caridad Jean DO personally performed the services described in this documentation    as scribed in my presence :

## 2021-05-10 ENCOUNTER — OFFICE VISIT (OUTPATIENT)
Dept: OBGYN CLINIC | Facility: CLINIC | Age: 66
End: 2021-05-10
Payer: MEDICARE

## 2021-05-10 VITALS
HEIGHT: 74 IN | WEIGHT: 188 LBS | SYSTOLIC BLOOD PRESSURE: 119 MMHG | BODY MASS INDEX: 24.13 KG/M2 | DIASTOLIC BLOOD PRESSURE: 82 MMHG | HEART RATE: 89 BPM

## 2021-05-10 DIAGNOSIS — M17.0 PRIMARY OSTEOARTHRITIS OF BOTH KNEES: Primary | ICD-10-CM

## 2021-05-10 PROCEDURE — 20610 DRAIN/INJ JOINT/BURSA W/O US: CPT | Performed by: ORTHOPAEDIC SURGERY

## 2021-05-10 RX ORDER — HYALURONATE SODIUM 10 MG/ML
20 SYRINGE (ML) INTRAARTICULAR
Status: COMPLETED | OUTPATIENT
Start: 2021-05-10 | End: 2021-05-10

## 2021-05-10 RX ADMIN — Medication 20 MG: at 11:25

## 2021-05-10 NOTE — PROGRESS NOTES
Assessment/Plan:    No problem-specific Assessment & Plan notes found for this encounter  Diagnoses and all orders for this visit:    Primary osteoarthritis of both knees           both knees were injected with his 3rd set of Euflexxa  He tolerated procedures quite well  Return back in 6 weeks for re-evaluation    Subjective:      Patient ID: Bulmaro Pimentel is a 76 y o  male  HPI     the patient has a history of degenerative joint disease of his bilateral knees  He presents for his 3rd set of Euflexxa  He denies any problems from last visit  He denies any numbness or tingling  He denies any fever chills  He continues to kayak without any difficulty      The following portions of the patient's history were reviewed and updated as appropriate: allergies, current medications, past family history, past medical history, past social history, past surgical history and problem list     Review of Systems   Constitutional: Negative for chills, fever and unexpected weight change  HENT: Negative for hearing loss, nosebleeds and sore throat  Eyes: Negative for pain, redness and visual disturbance  Respiratory: Negative for cough, shortness of breath and wheezing  Cardiovascular: Negative for chest pain, palpitations and leg swelling  Gastrointestinal: Negative for abdominal pain, nausea and vomiting  Endocrine: Negative for polydipsia and polyuria  Genitourinary: Negative for dysuria and hematuria  Musculoskeletal: Positive for arthralgias, gait problem and myalgias  Negative for back pain, joint swelling, neck pain and neck stiffness  As noted in HPI   Skin: Negative for rash and wound  Neurological: Negative for dizziness, numbness and headaches  Psychiatric/Behavioral: Negative for decreased concentration and suicidal ideas  The patient is not nervous/anxious            Objective:      /82   Pulse 89   Ht 6' 2" (1 88 m)   Wt 85 3 kg (188 lb)   BMI 24 14 kg/m² Physical Exam           bilateral lower extremities are neurovascular intact  Toes are pink and mobile  Compartments are soft  Range of motion both his knees are from 5-115 degrees  There is a negative Lachman's, drawer, pivot shift test   There is medial joint tenderness, lateral joint tenderness, and patellofemoral crepitation  There is a painful arc of motion of both his knees  Negative Homans  Neurologically intact distally  Large joint arthrocentesis: bilateral knee  Universal Protocol:  Consent: Verbal consent obtained  Risks and benefits: risks, benefits and alternatives were discussed  Consent given by: patient  Time out: Immediately prior to procedure a "time out" was called to verify the correct patient, procedure, equipment, support staff and site/side marked as required  Patient understanding: patient states understanding of the procedure being performed  Test results: test results available and properly labeled  Site marked: the operative site was marked  Radiology Images displayed and confirmed   If images not available, report reviewed: imaging studies available  Patient identity confirmed: verbally with patient    Supporting Documentation  Indications: pain   Procedure Details  Location: knee - bilateral knee  Needle size: 22 G  Ultrasound guidance: no  Approach: lateral    Medications (Right): 20 mg Sodium Hyaluronate 20 MG/2MLMedications (Left): 20 mg Sodium Hyaluronate 20 MG/2ML   Patient tolerance: patient tolerated the procedure well with no immediate complications  Dressing:  Sterile dressing applied

## 2021-06-21 ENCOUNTER — OFFICE VISIT (OUTPATIENT)
Dept: OBGYN CLINIC | Facility: CLINIC | Age: 66
End: 2021-06-21
Payer: MEDICARE

## 2021-06-21 VITALS — WEIGHT: 188 LBS | HEIGHT: 74 IN | BODY MASS INDEX: 24.13 KG/M2

## 2021-06-21 DIAGNOSIS — M17.0 PRIMARY OSTEOARTHRITIS OF BOTH KNEES: ICD-10-CM

## 2021-06-21 DIAGNOSIS — M19.012 PRIMARY OSTEOARTHRITIS OF BOTH SHOULDERS: Primary | ICD-10-CM

## 2021-06-21 DIAGNOSIS — M19.011 PRIMARY OSTEOARTHRITIS OF BOTH SHOULDERS: Primary | ICD-10-CM

## 2021-06-21 PROCEDURE — 20610 DRAIN/INJ JOINT/BURSA W/O US: CPT | Performed by: PHYSICIAN ASSISTANT

## 2021-06-21 PROCEDURE — 99213 OFFICE O/P EST LOW 20 MIN: CPT | Performed by: PHYSICIAN ASSISTANT

## 2021-06-21 RX ORDER — METHYLPREDNISOLONE ACETATE 40 MG/ML
2 INJECTION, SUSPENSION INTRA-ARTICULAR; INTRALESIONAL; INTRAMUSCULAR; SOFT TISSUE
Status: COMPLETED | OUTPATIENT
Start: 2021-06-21 | End: 2021-06-21

## 2021-06-21 RX ORDER — BUPIVACAINE HYDROCHLORIDE 2.5 MG/ML
4 INJECTION, SOLUTION INFILTRATION; PERINEURAL
Status: COMPLETED | OUTPATIENT
Start: 2021-06-21 | End: 2021-06-21

## 2021-06-21 RX ADMIN — BUPIVACAINE HYDROCHLORIDE 4 ML: 2.5 INJECTION, SOLUTION INFILTRATION; PERINEURAL at 14:52

## 2021-06-21 RX ADMIN — METHYLPREDNISOLONE ACETATE 2 ML: 40 INJECTION, SUSPENSION INTRA-ARTICULAR; INTRALESIONAL; INTRAMUSCULAR; SOFT TISSUE at 14:52

## 2021-06-21 NOTE — PROGRESS NOTES
ASSESSMENT/PLAN:    Diagnoses and all orders for this visit:    Primary osteoarthritis of both shoulders    Primary osteoarthritis of both knees  -     Injection procedure prior authorization; Future    Other orders  -     Large joint arthrocentesis  -     Large joint arthrocentesis         both of the patient's knees and shoulders were injected with Depo-Medrol and Marcaine  He tolerated the injections quite well  He will follow up with our office in November to resume viscosupplementation of both knees with Euflexxa  The patient is acceptable to this plan  Return in about 5 months (around 11/10/2021)  _____________________________________________________  CHIEF COMPLAINT:  Chief Complaint   Patient presents with    Left Knee - Follow-up    Right Knee - Follow-up    Left Shoulder - Follow-up    Right Shoulder - Follow-up         SUBJECTIVE:  Bulmaro Pimentel is a 78 y o  male who presents to our office complaining of bilateral knee and shoulder pain  The patient has a history of primary osteoarthritis of both knees and shoulders  He would like corticosteroid injections today  He denies any numbness or tingling  He denies any fever or chills  The following portions of the patient's history were reviewed and updated as appropriate: allergies, current medications, past family history, past medical history, past social history, past surgical history and problem list     PAST MEDICAL HISTORY:  Past Medical History:   Diagnosis Date    Arthritis     Lumbar herniated disc        PAST SURGICAL HISTORY:  Past Surgical History:   Procedure Laterality Date    HERNIA REPAIR  01/03/2005    KNEE ARTHROSCOPY Left 11/30/2005    KNEE ARTHROSCOPY Right 07/20/2005    SHOULDER ARTHROSCOPY Left     in the 1980's    TONSILLECTOMY         FAMILY HISTORY:  History reviewed  No pertinent family history      SOCIAL HISTORY:  Social History     Tobacco Use    Smoking status: Never Smoker    Smokeless tobacco: Never Used   Vaping Use    Vaping Use: Never used   Substance Use Topics    Alcohol use: Not Currently     Comment: quit 2000    Drug use: Not on file       MEDICATIONS:    Current Outpatient Medications:     HYDROcodone-acetaminophen (NORCO) 5-325 mg per tablet, Take 1 tablet by mouth every 6 (six) hours as needed for pain, Disp: , Rfl:     Current Facility-Administered Medications:     Sodium Hyaluronate 20 mg, 20 mg, Intra-articular, , Daniela Mere Odierno, PA-C, 20 mg at 07/31/20 1212    Sodium Hyaluronate 20 mg, 20 mg, Intra-articular, , Daniela Mere Odierno, PA-C, 20 mg at 07/31/20 1212    ALLERGIES:  Allergies   Allergen Reactions    Tetracyclines & Related        ROS:  Review of Systems     Constitutional: Negative for fatigue, fever or loss of appetite  HENT: Negative  Respiratory: Negative for shortness of breath, dyspnea  Cardiovascular: Negative for chest pain/tightness  Gastrointestinal: Negative for abdominal pain, N/V  Endocrine: Negative for cold/heat intolerance, unexplained weight loss/gain  Genitourinary: Negative for flank pain, dysuria, hematuria  Musculoskeletal: Positive for arthralgia   Skin: Negative for rash  Neurological: Negative for numbness or tingling  Psychiatric/Behavioral: Negative for agitation  _____________________________________________________  PHYSICAL EXAMINATION:    Height 6' 2" (1 88 m), weight 85 3 kg (188 lb)  Constitutional: Oriented to person, place, and time  Appears well-developed and well-nourished  No distress  HENT:   Head: Normocephalic  Eyes: Conjunctivae are normal  Right eye exhibits no discharge  Left eye exhibits no discharge  No scleral icterus  Cardiovascular: Normal rate  Pulmonary/Chest: Effort normal    Neurological: Alert and oriented to person, place, and time  Skin: Skin is warm and dry  No rash noted  Not diaphoretic  No erythema  No pallor  Psychiatric: Normal mood and affect   Behavior is normal  Judgment and thought content normal       MUSCULOSKELETAL EXAMINATION:   Physical Exam  Ortho Exam    Bilateral lower extremities are neurovascularly intact  Toes are pink and mobile   Compartments are soft  No warmth, erythema or ecchymosis  ROM of knees are from 5-115 degrees  Negative Lachman, drawer or pivot shift  No medial instability  Medial joint line tenderness, slight lateral joint line tenderness  Patellofemoral crepitation    Neck was soft and supple with a negative axial compression test   Bilateral upper extremities are neurovascularly intact  Fingers are pink and mobile  Compartments are soft  Forward flex to approximately 95° and abduct to 95° with crepitation  Mild signs of impingement  No instability  Rotator cuff strength 4/5    Objective:  BP Readings from Last 1 Encounters:   05/10/21 119/82      Wt Readings from Last 1 Encounters:   06/21/21 85 3 kg (188 lb)        BMI:   Estimated body mass index is 24 14 kg/m² as calculated from the following:    Height as of this encounter: 6' 2" (1 88 m)  Weight as of this encounter: 85 3 kg (188 lb)  PROCEDURES PERFORMED:  Large joint arthrocentesis: bilateral knee  Universal Protocol:  Consent given by: patient  Time out: Immediately prior to procedure a "time out" was called to verify the correct patient, procedure, equipment, support staff and site/side marked as required    Site marked: the operative site was marked  Supporting Documentation  Indications: pain   Procedure Details  Location: knee - bilateral knee  Needle size: 22 G  Approach: lateral    Medications (Right): 4 mL bupivacaine 0 25 %; 2 mL methylPREDNISolone acetate 40 mg/mLMedications (Left): 4 mL bupivacaine 0 25 %; 2 mL methylPREDNISolone acetate 40 mg/mL   Patient tolerance: patient tolerated the procedure well with no immediate complications  Dressing:  Sterile dressing applied    Large joint arthrocentesis: bilateral subacromial bursa  Universal Protocol:  Consent given by: patient  Time out: Immediately prior to procedure a "time out" was called to verify the correct patient, procedure, equipment, support staff and site/side marked as required    Site marked: the operative site was marked  Supporting Documentation  Indications: pain   Procedure Details  Location: shoulder - bilateral subacromial bursa  Preparation: Patient was prepped and draped in the usual sterile fashion  Needle size: 22 G  Ultrasound guidance: no  Approach: lateral    Medications (Right): 2 mL methylPREDNISolone acetate 40 mg/mL; 4 mL bupivacaine 0 25 %Medications (Left): 2 mL methylPREDNISolone acetate 40 mg/mL; 4 mL bupivacaine 0 25 %   Patient tolerance: patient tolerated the procedure well with no immediate complications  Dressing:  Sterile dressing applied            Gurdeep Jones PA-C

## 2021-08-26 ENCOUNTER — TELEPHONE (OUTPATIENT)
Dept: OBGYN CLINIC | Facility: HOSPITAL | Age: 66
End: 2021-08-26

## 2021-08-26 NOTE — TELEPHONE ENCOUNTER
Patient sees Dr Lopez    Patient called to get Cortisone shots in his Bilateral shoulders, pt had injections on 06/21, is it ok if he has them again on 08/30 ? It is less then 3 months but pt stated he goes to different activities, festivals like kayaking for a few weeks in Sept and wanted to have them before he left      Please Advise   [phone]

## 2021-08-30 ENCOUNTER — OFFICE VISIT (OUTPATIENT)
Dept: OBGYN CLINIC | Facility: CLINIC | Age: 66
End: 2021-08-30
Payer: MEDICARE

## 2021-08-30 VITALS
BODY MASS INDEX: 23.87 KG/M2 | WEIGHT: 186 LBS | HEIGHT: 74 IN | SYSTOLIC BLOOD PRESSURE: 161 MMHG | HEART RATE: 81 BPM | DIASTOLIC BLOOD PRESSURE: 89 MMHG

## 2021-08-30 DIAGNOSIS — M17.0 PRIMARY OSTEOARTHRITIS OF BOTH KNEES: ICD-10-CM

## 2021-08-30 DIAGNOSIS — M19.011 PRIMARY OSTEOARTHRITIS OF BOTH SHOULDERS: Primary | ICD-10-CM

## 2021-08-30 DIAGNOSIS — M19.012 PRIMARY OSTEOARTHRITIS OF BOTH SHOULDERS: Primary | ICD-10-CM

## 2021-08-30 PROCEDURE — 20610 DRAIN/INJ JOINT/BURSA W/O US: CPT | Performed by: ORTHOPAEDIC SURGERY

## 2021-08-30 PROCEDURE — 99213 OFFICE O/P EST LOW 20 MIN: CPT | Performed by: ORTHOPAEDIC SURGERY

## 2021-08-30 RX ORDER — BUPIVACAINE HYDROCHLORIDE 2.5 MG/ML
4 INJECTION, SOLUTION INFILTRATION; PERINEURAL
Status: COMPLETED | OUTPATIENT
Start: 2021-08-30 | End: 2021-08-30

## 2021-08-30 RX ORDER — METHYLPREDNISOLONE ACETATE 40 MG/ML
2 INJECTION, SUSPENSION INTRA-ARTICULAR; INTRALESIONAL; INTRAMUSCULAR; SOFT TISSUE
Status: COMPLETED | OUTPATIENT
Start: 2021-08-30 | End: 2021-08-30

## 2021-08-30 RX ADMIN — BUPIVACAINE HYDROCHLORIDE 4 ML: 2.5 INJECTION, SOLUTION INFILTRATION; PERINEURAL at 13:51

## 2021-08-30 RX ADMIN — METHYLPREDNISOLONE ACETATE 2 ML: 40 INJECTION, SUSPENSION INTRA-ARTICULAR; INTRALESIONAL; INTRAMUSCULAR; SOFT TISSUE at 13:51

## 2021-08-30 NOTE — PROGRESS NOTES
ASSESSMENT/PLAN:         both knees and both shoulders were injected with Depo-Medrol and Marcaine  He tolerated procedures quite well  Return back in 3 months evaluation  If there is any problems sooner, he will not hesitate to let us know  Physical examination shows mild signs of impingement was slight glenohumeral crepitation  There is also pain and crepitation along bilateral knees  It appears that he has arthritis along all 4 regions  Diagnoses and all orders for this visit:    Primary osteoarthritis of both shoulders  -     Large joint arthrocentesis: bilateral subacromial bursa    Primary osteoarthritis of both knees  -     Large joint arthrocentesis: bilateral knee        Both of the patient's knees and shoulders were injected with Depo-Medrol and Marcaine  He tolerated the injections quite well  He will follow up with our office in 3 months  The patient is acceptable to this plan  Return in about 3 months (around 11/30/2021)  _____________________________________________________  CHIEF COMPLAINT:  Chief Complaint   Patient presents with    Left Shoulder - Follow-up    Right Shoulder - Follow-up    Left Knee - Follow-up    Right Knee - Follow-up         SUBJECTIVE:  Arielle Sargent is a 78 y o  male who presents  To our office complaining of bilateral knee and shoulder pain  The patient has a history of primary osteoarthritis of both knees and shoulders  He would like corticosteroid injections today  He denies any numbness or tingling  He denies any fever or chills       The following portions of the patient's history were reviewed and updated as appropriate: allergies, current medications, past family history, past medical history, past social history, past surgical history and problem list     PAST MEDICAL HISTORY:  Past Medical History:   Diagnosis Date    Arthritis     Lumbar herniated disc        PAST SURGICAL HISTORY:  Past Surgical History:   Procedure Laterality Date  HERNIA REPAIR  01/03/2005    KNEE ARTHROSCOPY Left 11/30/2005    KNEE ARTHROSCOPY Right 07/20/2005    SHOULDER ARTHROSCOPY Left     in the 1980's    TONSILLECTOMY         FAMILY HISTORY:  History reviewed  No pertinent family history  SOCIAL HISTORY:  Social History     Tobacco Use    Smoking status: Never Smoker    Smokeless tobacco: Never Used   Vaping Use    Vaping Use: Never used   Substance Use Topics    Alcohol use: Not Currently     Comment: quit 2000    Drug use: Not on file       MEDICATIONS:    Current Outpatient Medications:     HYDROcodone-acetaminophen (NORCO) 5-325 mg per tablet, Take 1 tablet by mouth every 6 (six) hours as needed for pain, Disp: , Rfl:     Current Facility-Administered Medications:     Sodium Hyaluronate 20 mg, 20 mg, Intra-articular, , Sterling Goring Odierno, PA-C, 20 mg at 07/31/20 1212    Sodium Hyaluronate 20 mg, 20 mg, Intra-articular, , Sterling Goring Odierno, PA-C, 20 mg at 07/31/20 1212    ALLERGIES:  Allergies   Allergen Reactions    Tetracyclines & Related        ROS:  Review of Systems     Constitutional: Negative for fatigue, fever or loss of appetite  HENT: Negative  Respiratory: Negative for shortness of breath, dyspnea  Cardiovascular: Negative for chest pain/tightness  Gastrointestinal: Negative for abdominal pain, N/V  Endocrine: Negative for cold/heat intolerance, unexplained weight loss/gain  Genitourinary: Negative for flank pain, dysuria, hematuria  Musculoskeletal: Positive for arthralgia   Skin: Negative for rash  Neurological: Negative for numbness or tingling  Psychiatric/Behavioral: Negative for agitation  _____________________________________________________  PHYSICAL EXAMINATION:    Blood pressure 161/89, pulse 81, height 6' 2" (1 88 m), weight 84 4 kg (186 lb)  Constitutional: Oriented to person, place, and time  Appears well-developed and well-nourished  No distress  HENT:   Head: Normocephalic     Eyes: Conjunctivae are normal  Right eye exhibits no discharge  Left eye exhibits no discharge  No scleral icterus  Cardiovascular: Normal rate  Pulmonary/Chest: Effort normal    Neurological: Alert and oriented to person, place, and time  Skin: Skin is warm and dry  No rash noted  Not diaphoretic  No erythema  No pallor  Psychiatric: Normal mood and affect  Behavior is normal  Judgment and thought content normal       MUSCULOSKELETAL EXAMINATION:   Physical Exam  Ortho Exam    Bilateral lower extremities are neurovascularly intact  Toes are pink and mobile   Compartments are soft  No warmth, erythema or ecchymosis  ROM of knees are from 5-115 degrees  Negative Lachman, drawer or pivot shift  No medial instability  Medial joint line tenderness, slight lateral joint line tenderness  Patellofemoral crepitation    Neck is soft and supple  Negative axial compression test of the neck  B/L upper extremities are neurovascularly intact  Fingers are pink and mobile  Compartments are soft  Range of motion of the shoulders is 95° of forward flexion and abduction and internal rotation to L5  Minimal signs of impingement with no instability  Glenohumeral crepitation is present  Rotator cuff strength is 4/5  Pulses intact  Objective:  BP Readings from Last 1 Encounters:   08/30/21 161/89      Wt Readings from Last 1 Encounters:   08/30/21 84 4 kg (186 lb)        BMI:   Estimated body mass index is 23 88 kg/m² as calculated from the following:    Height as of this encounter: 6' 2" (1 88 m)  Weight as of this encounter: 84 4 kg (186 lb)  PROCEDURES PERFORMED:  Large joint arthrocentesis: bilateral knee  Universal Protocol:  Consent: Verbal consent obtained    Risks and benefits: risks, benefits and alternatives were discussed  Consent given by: patient  Patient understanding: patient states understanding of the procedure being performed  Site marked: the operative site was marked  Supporting Documentation  Indications: pain   Procedure Details  Location: knee - bilateral knee  Needle size: 22 G  Approach: lateral    Medications (Right): 4 mL bupivacaine 0 25 %; 2 mL methylPREDNISolone acetate 40 mg/mLMedications (Left): 4 mL bupivacaine 0 25 %; 2 mL methylPREDNISolone acetate 40 mg/mL   Patient tolerance: patient tolerated the procedure well with no immediate complications  Dressing:  Sterile dressing applied    Large joint arthrocentesis: bilateral subacromial bursa  Universal Protocol:  Risks and benefits: risks, benefits and alternatives were discussed  Consent given by: patient  Patient understanding: patient states understanding of the procedure being performed  Site marked: the operative site was marked  Supporting Documentation  Indications: pain   Procedure Details  Location: shoulder - bilateral subacromial bursa  Preparation: Patient was prepped and draped in the usual sterile fashion  Needle size: 22 G  Ultrasound guidance: no  Approach: lateral    Medications (Right): 2 mL methylPREDNISolone acetate 40 mg/mL; 4 mL bupivacaine 0 25 %Medications (Left): 2 mL methylPREDNISolone acetate 40 mg/mL; 4 mL bupivacaine 0 25 %   Patient tolerance: patient tolerated the procedure well with no immediate complications  Dressing:  Sterile dressing applied            Sandeep Esparza PA-C

## 2021-12-27 ENCOUNTER — EVALUATION (OUTPATIENT)
Dept: PHYSICAL THERAPY | Age: 66
End: 2021-12-27
Payer: MEDICARE

## 2021-12-27 DIAGNOSIS — Z96.652 S/P TKR (TOTAL KNEE REPLACEMENT), LEFT: Primary | ICD-10-CM

## 2021-12-27 DIAGNOSIS — M25.562 ACUTE PAIN OF LEFT KNEE: ICD-10-CM

## 2021-12-27 PROCEDURE — 97162 PT EVAL MOD COMPLEX 30 MIN: CPT | Performed by: PHYSICAL THERAPIST

## 2021-12-27 PROCEDURE — 97140 MANUAL THERAPY 1/> REGIONS: CPT | Performed by: PHYSICAL THERAPIST

## 2021-12-27 PROCEDURE — 97110 THERAPEUTIC EXERCISES: CPT | Performed by: PHYSICAL THERAPIST

## 2021-12-29 ENCOUNTER — OFFICE VISIT (OUTPATIENT)
Dept: PHYSICAL THERAPY | Age: 66
End: 2021-12-29
Payer: MEDICARE

## 2021-12-29 DIAGNOSIS — Z96.652 S/P TKR (TOTAL KNEE REPLACEMENT), LEFT: Primary | ICD-10-CM

## 2021-12-29 DIAGNOSIS — M25.562 ACUTE PAIN OF LEFT KNEE: ICD-10-CM

## 2021-12-29 PROCEDURE — 97140 MANUAL THERAPY 1/> REGIONS: CPT | Performed by: PHYSICAL THERAPIST

## 2021-12-29 PROCEDURE — 97110 THERAPEUTIC EXERCISES: CPT | Performed by: PHYSICAL THERAPIST

## 2022-01-04 ENCOUNTER — OFFICE VISIT (OUTPATIENT)
Dept: PHYSICAL THERAPY | Age: 67
End: 2022-01-04
Payer: MEDICARE

## 2022-01-04 DIAGNOSIS — M25.562 ACUTE PAIN OF LEFT KNEE: ICD-10-CM

## 2022-01-04 DIAGNOSIS — Z96.652 S/P TKR (TOTAL KNEE REPLACEMENT), LEFT: Primary | ICD-10-CM

## 2022-01-04 PROCEDURE — 97110 THERAPEUTIC EXERCISES: CPT | Performed by: PHYSICAL THERAPIST

## 2022-01-04 PROCEDURE — 97140 MANUAL THERAPY 1/> REGIONS: CPT | Performed by: PHYSICAL THERAPIST

## 2022-01-04 NOTE — PROGRESS NOTES
Daily Note     Today's date: 2022  Patient name: Kecia Frances  : 1955  MRN: [id number]  Referring provider: Meme Urbano MD  Dx:   Encounter Diagnosis     ICD-10-CM    1  S/P TKR (total knee replacement), left  Z96 652    2  Acute pain of left knee  M25 562        Start Time: 0845  Stop Time: 945  Total time in clinic (min): 60 minutes    Subjective: Patient called MD, c/o stiffness in left knee  He did cut off his pain medication  Objective: See treatment diary below      Assessment: Tolerated treatment well  Patient would benefit from continued PT  Patient feels better after stretching  Some antalgic gait   Noted with toe off  Weak hamstrings  Instructed and reviewed HEP with patient on progression  Plan: Continue per plan of care        Precautions: OA, B shld sx, B knee scope, Back pain,       Manuals  11          Left knee 10' 8' 8                                                 Neuro Re-Ed                                                    Ther Ex             L heel slides 30x 30x 30x          L SLR 3x10 3x10 3x10          Hip add-ball 30x 30x 30x          Hip abd-band 30x BTB  30x 30x          L SAQ   30x          L LAQ  30x 30x          Knee flexion prone   30x          Slant board  L3  30"/3x 4x          Nustep  L3  5' 6' L4          TKE   30x                                    Ther Activity                                       Gait Training                                       Modalities             Ice after 10'  10'

## 2022-01-07 ENCOUNTER — OFFICE VISIT (OUTPATIENT)
Dept: PHYSICAL THERAPY | Age: 67
End: 2022-01-07
Payer: MEDICARE

## 2022-01-07 DIAGNOSIS — M25.562 ACUTE PAIN OF LEFT KNEE: ICD-10-CM

## 2022-01-07 DIAGNOSIS — Z96.652 S/P TKR (TOTAL KNEE REPLACEMENT), LEFT: Primary | ICD-10-CM

## 2022-01-07 PROCEDURE — 97140 MANUAL THERAPY 1/> REGIONS: CPT | Performed by: PHYSICAL THERAPIST

## 2022-01-07 PROCEDURE — 97110 THERAPEUTIC EXERCISES: CPT | Performed by: PHYSICAL THERAPIST

## 2022-01-07 NOTE — PROGRESS NOTES
Daily Note     Today's date: 2022  Patient name: Basilia Cerna  : 1955  MRN: [id number]  Referring provider: Kamaljit Rios MD  Dx:   Encounter Diagnosis     ICD-10-CM    1  S/P TKR (total knee replacement), left  Z96 652    2  Acute pain of left knee  M25 562        Start Time: 5077  Stop Time: 0945  Total time in clinic (min): 62 minutes    Subjective: Patient reports he is doing well, doing workout at home  C/o clicking lateral left knee with bending  Objective: See treatment diary below      Assessment: Tolerated treatment well  Patient would benefit from continued PT  Cues for ambulation to bend knee  Tends to hold it stiff  Warmth to palpate and minimal edema noted in left knee  Advised to continue to ice and monitor his symptoms after activity  No AD for ambulation  Plan: Continue per plan of care        Precautions: OA, B shld sx, B knee scope, Back pain,       Manuals          Left knee 10' 8' 8 8         ITB roller L    5'                      ROM    111°         Neuro Re-Ed                                                    Ther Ex             L heel slides 30x 30x 30x 30x         L hip abd SL    30x         L SLR 3x10 3x10 3x10 2# 30x         Hip add-ball 30x 30x 30x 30x         Hip abd-band 30x BTB  30x 30x 30x blk         L SAQ   30x 2# 30x         L LAQ  30x 30x 2# 30x         Knee flexion prone   30x 2# 30x         Prone extension hang    4' 2#         Slant board  L3  30"/3x 4x 4x         Nustep  L3  5' 6' L4 L 5 7'         TKE   30x 30x blk         Bridge w/ ball     30x         bike             Hip abduction             Hip adduction                          Ther Activity             Side stepping             Step ups             Gait Training                                       Modalities             Ice after 10'  10' 10'

## 2022-01-11 ENCOUNTER — OFFICE VISIT (OUTPATIENT)
Dept: PHYSICAL THERAPY | Age: 67
End: 2022-01-11
Payer: MEDICARE

## 2022-01-11 DIAGNOSIS — M25.562 ACUTE PAIN OF LEFT KNEE: ICD-10-CM

## 2022-01-11 DIAGNOSIS — Z96.652 S/P TKR (TOTAL KNEE REPLACEMENT), LEFT: Primary | ICD-10-CM

## 2022-01-11 PROCEDURE — 97110 THERAPEUTIC EXERCISES: CPT | Performed by: PHYSICAL THERAPIST

## 2022-01-11 NOTE — PROGRESS NOTES
Daily Note     Today's date: 2022  Patient name: Jeffrey Bennett  : 1955  MRN: [id number]  Referring provider: Evelia Kendrick MD  Dx:   Encounter Diagnosis     ICD-10-CM    1  S/P TKR (total knee replacement), left  Z96 652    2  Acute pain of left knee  M25 562        Start Time: 0846  Stop Time: 3784  Total time in clinic (min): 69 minutes    Subjective: Patient still c/o lateral tendon snapping when he bends his knee with every step  He did touch base with PA  Objective: See treatment diary below      Assessment: Tolerated treatment well  Patient would benefit from continued PT  Tender superficial incision with minimal scar tissue noted  Tight left ITB distal with tenderness  Applied KT tape to ITB distal with less snapping reported  Plan: Continue per plan of care        Precautions: OA, B shld sx, B knee scope, Back pain,       Manuals         Left knee 10' 8' 8 8 5        ITB roller L    5' 5                     ROM    111°         Neuro Re-Ed             KT tape ITB     8'                                  Ther Ex             L heel slides 30x 30x 30x 30x nt        L hip abd SL    30x 2# 30x        L SLR 3x10 3x10 3x10 2# 30x 2# 30x        Hip add-ball 30x 30x 30x 30x D/c        Hip abd-clam band 30x BTB  30x 30x 30x blk 30x        L SAQ   30x 2# 30x 2# 30x        L LAQ  30x 30x 2# 30x 2# 30x        Knee flexion prone   30x 2# 30x nt        Prone extension hang    4' 2# nt        Slant board  L3  30"/3x 4x 4x 4x        Nustep  L3  5' 6' L4 L 5 7' 8' L5        TKE   30x 30x blk 30x        Bridge w/ ball     30x 30x        bike     5'        Hip abduction     30# 30x        Hip adduction     30# 30x        Calf press     30# 30x seat 16        Ther Activity             Side stepping             Step ups             Gait Training                                       Modalities             Ice after 10'  10' 10' 10'

## 2022-01-14 ENCOUNTER — OFFICE VISIT (OUTPATIENT)
Dept: PHYSICAL THERAPY | Age: 67
End: 2022-01-14
Payer: MEDICARE

## 2022-01-14 DIAGNOSIS — M25.562 ACUTE PAIN OF LEFT KNEE: ICD-10-CM

## 2022-01-14 DIAGNOSIS — Z96.652 S/P TKR (TOTAL KNEE REPLACEMENT), LEFT: Primary | ICD-10-CM

## 2022-01-14 PROCEDURE — 97110 THERAPEUTIC EXERCISES: CPT

## 2022-01-14 NOTE — PROGRESS NOTES
Daily Note     Today's date: 2022  Patient name: Trniity Felix  : 1955  MRN: [id number]  Referring provider: Zoraida Flores MD  Dx:   Encounter Diagnosis     ICD-10-CM    1  S/P TKR (total knee replacement), left  Z96 652    2  Acute pain of left knee  M25 562        Start Time: 0845  Stop Time: 6971  Total time in clinic (min): 70 minutes    Subjective: Reports no new complaints       Objective: See treatment diary below      Assessment: Tolerated treatment well  Added lateral step down on a 4" step with some hip IR/adduction noted, improved with verbal cueing  Discomfort noted with prone hang that subsides at rest  Some difficulty making full revolutions on bike, needs seat to be set back in order to achieve initially  Knee flexion PROM is progressing well  Continues with some swelling at L knee  Decreased TTP with roller today along L ITB  Patient would benefit from continued PT  Plan: Continue per plan of care        Precautions: OA, B shld sx, B knee scope, Back pain,       Manuals        Left knee 10' 8' 8 8 5 5'       ITB roller L    5' 5 5'                    ROM    111°         Neuro Re-Ed             KT tape ITB     8' 8'                                 Ther Ex             L heel slides 30x 30x 30x 30x nt NT       L hip abd SL    30x 2# 30x 2# 30x       L SLR 3x10 3x10 3x10 2# 30x 2# 30x 2# 30x        Hip add-ball 30x 30x 30x 30x D/c        Hip abd-clam band 30x BTB  30x 30x 30x blk 30x 30x blk       L SAQ   30x 2# 30x 2# 30x 2# 30x       L LAQ  30x 30x 2# 30x 2# 30x 2# 30x        Knee flexion prone   30x 2# 30x nt 2# 30x        Prone extension hang    4' 2# nt 4' 2#        Slant board  L3  30"/3x 4x 4x 4x 4x        Nustep  L3  5' 6' L4 L 5 7' 8' L5 L5 8'       TKE   30x 30x blk 30x 30x        Bridge w/ ball     30x 30x 30x       bike     5' 5'       Hip abduction     30# 30x 30# 30x       Hip adduction     30# 30x 40# 30x       Calf press  Seat: 16 30# 30x  30# 30x        Ther Activity             Side stepping             Step ups      6'' x20       Lateral step down      4'' x20       Gait Training                                       Modalities             Ice after 10'  10' 10' 10' 10'

## 2022-01-18 ENCOUNTER — OFFICE VISIT (OUTPATIENT)
Dept: PHYSICAL THERAPY | Age: 67
End: 2022-01-18
Payer: MEDICARE

## 2022-01-18 DIAGNOSIS — Z96.652 S/P TKR (TOTAL KNEE REPLACEMENT), LEFT: Primary | ICD-10-CM

## 2022-01-18 DIAGNOSIS — M25.562 ACUTE PAIN OF LEFT KNEE: ICD-10-CM

## 2022-01-18 PROCEDURE — 97110 THERAPEUTIC EXERCISES: CPT | Performed by: PHYSICAL THERAPIST

## 2022-01-18 NOTE — PROGRESS NOTES
Daily Note     Today's date: 2022  Patient name: Monie Rangel  : 1955  MRN: [id number]  Referring provider: Lars Simmons MD  Dx:   Encounter Diagnosis     ICD-10-CM    1  S/P TKR (total knee replacement), left  Z96 652    2  Acute pain of left knee  M25 562        Start Time: 1507  Stop Time: 0133  Total time in clinic (min): 68 minutes    Subjective: Patient reports he still gets the snapping lateral left knee  Objective: See treatment diary below      Assessment: Tolerated treatment well  Patient would benefit from continued PT ROM slowly progressing knee flexion  Tight with stretching TKE, cues for TKE with cues to heel strike  Plan: Continue per plan of care        Precautions: OA, B shld sx, B knee scope, Back pain,       Manuals       Left knee 10' 8' 8 8 5 5' 5'      ITB roller L    5' 5 5' 5'                   ROM    111°   115      Neuro Re-Ed             KT tape ITB     8' 8' 8'                                Ther Ex             L heel slides 30x 30x 30x 30x nt NT 30x      L hip abd SL    30x 2# 30x 2# 30x       L SLR 3x10 3x10 3x10 2# 30x 2# 30x 2# 30x  2# 30x                   Hip abd-clam band 30x BTB  30x 30x 30x blk 30x 30x blk 30x      L SAQ   30x 2# 30x 2# 30x 2# 30x 3# 30x      L LAQ  30x 30x 2# 30x 2# 30x 2# 30x  3# 30x      Knee flexion prone   30x 2# 30x nt 2# 30x  nt      Prone extension hang    4' 2# nt 4' 2#  nt      Slant board  L3  30"/3x 4x 4x 4x 4x  4x      Nustep  L3  5' 6' L4 L 5 7' 8' L5 L5 8' 10' L5      TKE   30x 30x blk 30x 30x  30x      Bridge w/ ball     30x 30x 30x 30x      bike     5' 5'       Hip abduction     30# 30x 30# 30x 35# 30x      Hip adduction     30# 30x 40# 30x 40# 30x      Calf press  Seat: 16     30# 30x  30# 30x  40# 30x      Ther Activity             Side stepping             Step ups      6'' x20 30x      Lateral step down      4'' x20 30x      Gait Training Modalities             Ice after 10'  10' 10' 10' 10' 10'

## 2022-01-21 ENCOUNTER — OFFICE VISIT (OUTPATIENT)
Dept: PHYSICAL THERAPY | Age: 67
End: 2022-01-21
Payer: MEDICARE

## 2022-01-21 DIAGNOSIS — Z96.652 S/P TKR (TOTAL KNEE REPLACEMENT), LEFT: Primary | ICD-10-CM

## 2022-01-21 DIAGNOSIS — M25.562 ACUTE PAIN OF LEFT KNEE: ICD-10-CM

## 2022-01-21 PROCEDURE — 97112 NEUROMUSCULAR REEDUCATION: CPT

## 2022-01-21 PROCEDURE — 97110 THERAPEUTIC EXERCISES: CPT

## 2022-01-21 NOTE — PROGRESS NOTES
Daily Note     Today's date: 2022  Patient name: Wilma Segura  : 1955  MRN: [id number]  Referring provider: Suzette Herr MD  Dx:   Encounter Diagnosis     ICD-10-CM    1  S/P TKR (total knee replacement), left  Z96 652    2  Acute pain of left knee  M25 562        Start Time: 09  Stop Time: 1035  Total time in clinic (min): 65 minutes    Subjective: States the snapping he was feeling during ambulation is not as frequent but he still feels it on the stairs  Also reports some discomfort at posterior knee at biceps femoris insertion  Objective: See treatment diary below      Assessment:  Applied K-tape with Y cut along ITB and biceps femoris anchored at lateral knee  Also performed roller to ITB and lateral hamstring secondary to complaints of discomfort more posteriorly at biceps femoris insertion  Added standing SLR 3-way B to encourage increased stance time on LLE and increased B hip strength  Cues for carryover of program and to ensure proper dosage of prescribed exercises are performed  Patient would benefit from continued PT  Plan: Continue per plan of care        Precautions: OA, B shld sx, B knee scope, Back pain,       Manuals      Left knee 10' 8' 8 8 5 5' 5' 5'     ITB roller L    5' 5 5' 5' 5' ITB/HS                  ROM    111°   115 118     Neuro Re-Ed             KT tape ITB     8' 8' 8' 8'                                Ther Ex             L heel slides 30x 30x 30x 30x nt NT 30x 30x      L hip abd SL    30x 2# 30x 2# 30x  2# 30x     L SLR 3x10 3x10 3x10 2# 30x 2# 30x 2# 30x  2# 30x 2# 30x                  Hip abd-clam band 30x BTB  30x 30x 30x blk 30x 30x blk 30x At home     L SAQ   30x 2# 30x 2# 30x 2# 30x 3# 30x 3# 30x      L LAQ  30x 30x 2# 30x 2# 30x 2# 30x  3# 30x 3# 30x     Knee flexion prone   30x 2# 30x nt 2# 30x  nt      Prone extension hang    4' 2# nt 4' 2#  nt      Standing SLR 3-way B         30x  2#     Slant board L3  30"/3x 4x 4x 4x 4x  4x 4x     Nustep  L3  5' 6' L4 L 5 7' 8' L5 L5 8' 10' L5 L5 x10'     TKE   30x 30x blk 30x 30x  30x 30x blk     Bridge w/ ball     30x 30x 30x 30x 30x      bike     5' 5'       Hip abduction     30# 30x 30# 30x 35# 30x 35# 30x     Hip adduction     30# 30x 40# 30x 40# 30x 40# 30x      Calf press  Seat: 16     30# 30x  30# 30x  40# 30x 40# 30x      Ther Activity             Side stepping             Step ups      6'' x20 30x 30x     Lateral step down      4'' x20 30x 30x     Gait Training                                       Modalities             Ice after 10'  10' 10' 10' 10' 10' 10'

## 2022-01-25 ENCOUNTER — EVALUATION (OUTPATIENT)
Dept: PHYSICAL THERAPY | Age: 67
End: 2022-01-25
Payer: MEDICARE

## 2022-01-25 DIAGNOSIS — M25.562 ACUTE PAIN OF LEFT KNEE: ICD-10-CM

## 2022-01-25 DIAGNOSIS — Z96.652 S/P TKR (TOTAL KNEE REPLACEMENT), LEFT: Primary | ICD-10-CM

## 2022-01-25 PROCEDURE — 97110 THERAPEUTIC EXERCISES: CPT | Performed by: PHYSICAL THERAPIST

## 2022-01-25 NOTE — LETTER
2022    Chanda Armijo MD  [address] [facility] [address]    Patient: Skip Baez   YOB: 1955   Date of Visit: 2022     Encounter Diagnosis     ICD-10-CM    1  S/P TKR (total knee replacement), left  Z96 652    2  Acute pain of left knee  M25 562        Dear Dr Beatriz Almanzar: Thank you for your recent referral of Skip Baez  Please review the attached evaluation summary from [facility] recent visit  Please verify that you agree with the plan of care by signing the attached order  If you have any questions or concerns, please do not hesitate to call  I sincerely appreciate the opportunity to share in the care of one of your patients and hope to have another opportunity to work with you in the near future  Sincerely,    Aba Khan, PT      Referring Provider:      I certify that I have read the below Plan of Care and certify the need for these services furnished under this plan of treatment while under my care  Chanda Armijo MD  [address] [facility] [address]  Via Fax: [phone]          PT Re-Evaluation     Today's date: 2022  Patient name: Skip Baez  : 1955  MRN: [id number]  Referring provider: Mis Bernabe MD  Dx:   Encounter Diagnosis     ICD-10-CM    1  S/P TKR (total knee replacement), left  Z96 652    2  Acute pain of left knee  M25 562        Start Time: 1430  Stop Time: 1540  Total time in clinic (min): 70 minutes    Assessment  Assessment details: Skip Baez is a 78 y o  male who presents with pain, decreased strength, decreased ROM, decreased joint mobility, joint effusion and ambulatory dysfunction  Due to these impairments, Patient has difficulty performing a/iadls and recreational activities   Patient's clinical presentation is consistent with their referring diagnosis of left TKR, left knee pain  Patient has been doing a month of PT and making good gains  C/o clicking lateral knee with walking and Patient would benefit from skilled physical therapy to address their aforementioned impairments, improve their level of function and to improve their overall quality of life  Impairments: abnormal gait, abnormal or restricted ROM, activity intolerance, impaired physical strength, lacks appropriate home exercise program, pain with function and poor body mechanics    Symptom irritability: lowUnderstanding of Dx/Px/POC: excellent  Goals  STG (to be met within 4 weeks):  1  Pt will improve L knee pain to no more than 4/10 at worst in order to improve gait quality  Pain 3/10 at worst  Ongoing goal to decrease pain  2  Pt will improve L knee extension ROM by at least 5° in order to normalize gait pattern  Improving  Ongoing goal    3  Pt will improve L knee flexion ROM by at least 10° in order to negotiate stairs  Able to do reciprocal stairs  4  Pt will improve quadricep contraction to fair in order to improve stability in SL stance  Able to SLS but continue to progress time on SL  Ongoing goal    5  Pt will improve L knee strength by at least grade in order to negotiate curb step goal met  6  Pt will ambulate without AD in order to maximize independence  Goal met  7  Pt to improve FOTO score by at least 10 points in order to progress to PLOF  Improving  Ongoing goal to max functional potential      LTG (to bet met in 10 weeks):  1  Pt will be able to perform all activities without L knee pain in order to maximize independence  Progressing towards  Ongoing goal    2  Pt will restore WFL L knee ROM in order to perform all functional activities Progressing towards  Ongoing goal   3  Pt will restore WFL knee strength in order to return to hobbies Progressing towards   Ongoing goal   4  Pt will be able to ascend/descend 1 flight of stairs with reciprocal gait pattern in order to return PLOF  Ongoing goal    5  Pt will be able to ambulate on uneven surfaces with least restrictive AD in order to ambulate in the community  Ongoing goal    6  Pt to meet FOTO discharge score in order to improve QOL and maximize independence Progressing towards  Ongoing goal     Plan  Patient would benefit from: skilled physical therapy  Planned modality interventions: cryotherapy, thermotherapy: hydrocollator packs and unattended electrical stimulation  Planned therapy interventions: activity modification, behavior modification, body mechanics training, flexibility, functional ROM exercises, home exercise program, IADL retraining, joint mobilization, manual therapy, neuromuscular re-education, patient education, postural training, strengthening, stretching, therapeutic activities, therapeutic exercise, balance/weight bearing training and gait training  Frequency: 2-3x week  Duration in weeks: 8  Plan of Care beginning date: 2021  Plan of Care expiration date: 3/27/2022  Treatment plan discussed with: patient        Subjective Evaluation    History of Present Illness  Date of surgery: 2021  Mechanism of injury: surgery  Mechanism of injury: Patient reports to PT s/p left TKR  He has been doing his HEP and able to begin OPT now    22: Patient saw MD today and ordered celebrex for 2 weeks due to the clicking in his knee  Patient c/o right knee worse than his new left knee now     Quality of life: excellent    Pain  Current pain ratin  At worst pain rating: 3  Location: left knee  Relieving factors: ice, rest and change in position  Aggravating factors: stair climbing and walking  Progression: improved    Social Support  Steps to enter house: no  Stairs in house: no   Lives in: [facility]  Lives with: spouse    Employment status: not working    Diagnostic Tests  X-ray: abnormal  MRI studies: abnormal  Treatments  Previous treatment: injection treatment and medication  Patient Goals  Patient goals for therapy: decreased pain, decreased edema, increased motion, increased strength, independence with ADLs/IADLs and return to sport/leisure activities  Patient goal: Return to snowboarding next year        Objective     Observations   Left Knee   Positive for incision  Additional Observation Details    Incision closed with mild scar tissue noted  OA/DJD right knee per patient and going to have TKR done later this year  Tenderness   Left Knee   Tenderness in the ITB  Additional Tenderness Details  Patient c/o snapping lateral left knee with walking    Neurological Testing     Sensation     Knee   Left Knee   Intact: light touch    Right Knee   Intact: light touch     Active Range of Motion   Left Knee   Flexion: 118 degrees with pain  Extension: -5 degrees     Right Knee   Flexion: 128 degrees   Extension: 0 degrees     Passive Range of Motion   Left Knee   Flexion: 120 degrees with pain  Extension: -2 degrees     Mobility   Patellar Mobility:   Left Knee   WFL: medial, lateral, superior and inferior  Strength/Myotome Testing     Left Knee   Flexion: 4+  Extension: 4+  Quadriceps contraction: good    Right Knee   Flexion: 4+  Extension: 4+    Swelling     Left Knee Girth Measurement (cm)   10 cm above joint line: supra patella  Right Knee Girth Measurement (cm)   Joint line: 37 5 cm  10 cm above joint line: 38 8 (supra patella) cm    Ambulation   Weight-Bearing Status   Weight-Bearing Status (Left): weight-bearing as tolerated   Assistive device used: none    Ambulation: Level Surfaces   Ambulation without assistive device: independent    Ambulation: Stairs   Pattern: reciprocal  Railings: one rail  Pattern: reciprocal  Railings: one rail    Observational Gait   Gait: antalgic   Decreased walking speed  General Comments:    Lower quarter screen   Hips: unremarkable    Knee Comments        Ankle/Foot Comments   Swelling around malleolus, advised to elevate more at home  Precautions: OA, B shld sx, B knee scope, Back pain,       Manuals 12/27 12/29 11/4 1/7 1/11 1/14 1/18 1/21 1/25    Left knee 10' 8' 8 8 5 5' 5' 5' 5'    ITB roller L    5' 5 5' 5' 5' ITB/HS 5'                 ROM    111°   115 118 120P    Neuro Re-Ed             KT tape ITB     8' 8' 8' 8'  nt    Pegs SLS         Blue 3x                 Ther Ex             L heel slides 30x 30x 30x 30x nt NT 30x 30x  30x    L hip abd SL    30x 2# 30x 2# 30x  2# 30x 3# 30x    L SLR 3x10 3x10 3x10 2# 30x 2# 30x 2# 30x  2# 30x 2# 30x 3# 30x                 Hip abd-clam band 30x BTB  30x 30x 30x blk 30x 30x blk 30x At home     L SAQ   30x 2# 30x 2# 30x 2# 30x 3# 30x 3# 30x  5# 30x    L LAQ  30x 30x 2# 30x 2# 30x 2# 30x  3# 30x 3# 30x 5#30x    Knee flexion prone   30x 2# 30x nt 2# 30x  nt      Prone extension hang    4' 2# nt 4' 2#  nt      Standing SLR 3-way B         30x  nt    Slant board  L3  30"/3x 4x 4x 4x 4x  4x 4x 4x    Nustep  L3  5' 6' L4 L 5 7' 8' L5 L5 8' 10' L5 L5 x10' L6 10'    TKE   30x 30x blk 30x 30x  30x 30x blk 30x    Bridge w/ band         30x    bike     5' 5'   10'    Hip abduction     30# 30x 30# 30x 35# 30x 35# 30x 35# 30x    Hip adduction     30# 30x 40# 30x 40# 30x 40# 30x  40# 30x    Calf press  Seat: 16     30# 30x  30# 30x  40# 30x 40# 30x  40# 30x    Ther Activity             Side stepping legs straight         3x    Step ups      6'' x20 30x 30x 30x    Lateral step down      4'' x20 30x 30x 30x    Gait Training                                       Modalities             Ice after 10'  10' 10' 10' 10' 10' 10' 10'

## 2022-01-28 ENCOUNTER — APPOINTMENT (OUTPATIENT)
Dept: PHYSICAL THERAPY | Age: 67
End: 2022-01-28
Payer: MEDICARE

## 2022-02-04 NOTE — PROGRESS NOTES
Patient reports he is doing exceptionally well, he returned to [email or web address] and doing well  His right knee is limiting his running ability but his left knee is awesome/pt  Patient very happy with his progress  States his lateral ITB clicking only happens about 2x/day now not every step as before  D/c PT per patient  Reviewed his HEP  Patient compliant and knowledgeable with exercises  All PT goals met  D/c PT at this time

## 2022-08-16 ENCOUNTER — HOSPITAL ENCOUNTER (OUTPATIENT)
Dept: MRI IMAGING | Facility: HOSPITAL | Age: 67
Discharge: HOME | End: 2022-08-16
Attending: INTERNAL MEDICINE
Payer: MEDICARE

## 2022-08-16 DIAGNOSIS — M54.2 CERVICALGIA: ICD-10-CM

## 2022-08-16 PROCEDURE — 72141 MRI NECK SPINE W/O DYE: CPT

## 2022-08-16 PROCEDURE — G1004 CDSM NDSC: HCPCS

## 2022-10-24 ENCOUNTER — EVALUATION (OUTPATIENT)
Dept: PHYSICAL THERAPY | Age: 67
End: 2022-10-24
Payer: MEDICARE

## 2022-10-24 DIAGNOSIS — Z96.651 S/P REVISION OF TOTAL KNEE, RIGHT: Primary | ICD-10-CM

## 2022-10-24 DIAGNOSIS — M25.561 ACUTE PAIN OF RIGHT KNEE: ICD-10-CM

## 2022-10-24 PROCEDURE — 97162 PT EVAL MOD COMPLEX 30 MIN: CPT | Performed by: PHYSICAL THERAPIST

## 2022-10-24 PROCEDURE — 97110 THERAPEUTIC EXERCISES: CPT | Performed by: PHYSICAL THERAPIST

## 2022-10-24 PROCEDURE — 97140 MANUAL THERAPY 1/> REGIONS: CPT | Performed by: PHYSICAL THERAPIST

## 2022-10-24 NOTE — LETTER
2022    Dorian Wood MD  [address] [facility] [address]    Patient: Gerhard Correa   YOB: 1955   Date of Visit: 10/24/2022     Encounter Diagnosis     ICD-10-CM    1  S/P revision of total knee, right  Z96 651    2  Acute pain of right knee  M25 561        Dear Dr Chao Re: Thank you for your recent referral of Gerhard Correa  Please review the attached evaluation summary from [facility] recent visit  Please verify that you agree with the plan of care by signing the attached order  If you have any questions or concerns, please do not hesitate to call  I sincerely appreciate the opportunity to share in the care of one of your patients and hope to have another opportunity to work with you in the near future  Sincerely,    Xiao Bhatti, PT      Referring Provider:      I certify that I have read the below Plan of Care and certify the need for these services furnished under this plan of treatment while under my care  Dorian Wood MD  [address] [facility] [address]  Via Fax: [phone]          PT Evaluation     Today's date: 10/24/2022  Patient name: Gerhard Correa  : 1955  MRN: [id number]  Referring provider: Alivia Medellin MD  Dx:   Encounter Diagnosis     ICD-10-CM    1  S/P revision of total knee, right  Z96 651    2  Acute pain of right knee  M25 561        Start Time: 1001  Stop Time: 1057  Total time in clinic (min): 56 minutes    Assessment  Assessment details: Gerhard Correa is a 75 y o  male who presents with pain, decreased strength, decreased ROM and swelling RLE  Due to these impairments, Patient has difficulty performing a/iadls and recreational activities  Patient's clinical presentation is consistent with their referring diagnosis of s/p RTKR   Patient comes to PT with no AD 6 days post op  He has severe ecchymosis posterior and medial right thigh  Patient was instructed in ice and elevation to RLE for HEP  Patient would benefit from skilled physical therapy to address their aforementioned impairments, improve their level of function and to improve their overall quality of life  Impairments: abnormal or restricted ROM, activity intolerance, impaired physical strength, lacks appropriate home exercise program, pain with function, poor posture  and poor body mechanics    Symptom irritability: moderateUnderstanding of Dx/Px/POC: excellent  Goals  ST-3 WEEKS  1  Decrease pain right knee < 2/10 on VAS at its worst   2   Increase ROM right knee flexion > 115    3   Increase right knee strength > 4+/5   4  Able to perform reciprocal stairs  LT-6 WEEKS  1  Patient to be independent with a/iadls  2  Increase functional activities for leisure and home activities to previous LOF  3  Independent with HEP and/or fitness program     Plan  Patient would benefit from: skilled physical therapy  Planned modality interventions: cryotherapy, thermotherapy: hydrocollator packs and unattended electrical stimulation  Planned therapy interventions: activity modification, behavior modification, body mechanics training, flexibility, functional ROM exercises, home exercise program, IADL retraining, joint mobilization, manual therapy, neuromuscular re-education, patient education, postural training, strengthening, stretching, therapeutic activities, therapeutic exercise and balance/weight bearing training  Frequency: 2-3x week  Duration in weeks: 8  Plan of Care beginning date: 10/24/2022  Plan of Care expiration date: 2022  Treatment plan discussed with: patient        Subjective Evaluation    History of Present Illness  Date of surgery: 10/18/2022  Mechanism of injury: surgery  Mechanism of injury: Patient had elective right TKR last week  He progressed to no AD, has been walking a lot   Only c/o pain anterior knee  Not a recurrent problem   Quality of life: excellent    Pain  Current pain ratin  At worst pain ratin  Location: R knee   Quality: dull ache and discomfort  Relieving factors: rest and ice  Aggravating factors: walking and stair climbing  Progression: improved    Social Support  Steps to enter house: no  Stairs in house: no   Lives in: [facility]  Lives with: spouse    Employment status: not working  Patient Goals  Patient goals for therapy: decreased pain, increased motion, increased strength and return to sport/leisure activities  Patient goal: able to kneel, snowboard, kayak        Objective     Observations     Right Knee   Positive for edema, effusion and incision  Additional Observation Details  Severe eccymosis posterior and medial right thigh  28cm non removable dressing over incision  Patient was instructed by MD to remove tomorrow  Neurological Testing     Sensation     Knee   Left Knee   Intact: light touch    Right Knee   Intact: light touch     Additional Neurological Details  Patient denies any radicular symptoms in RLE    Active Range of Motion   Left Knee   Flexion: 123 degrees   Extension: 0 degrees     Right Knee   Flexion: 95 degrees with pain  Extension: -4 degrees     Mobility   Patellar Mobility:     Right Knee   WFL: medial, lateral, superior and inferior    Strength/Myotome Testing     Left Knee   Normal strength    Additional Strength Details  rigtht knee not tested due to recent surgery      Swelling     Left Knee Girth Measurement (cm)   Joint line: 36 1 cm  10 cm above joint line: 39 (supra patella) cm    Right Knee Girth Measurement (cm)   Joint line: 40 5 cm  10 cm above joint line: 46 (supra patella) cm    Ambulation   Weight-Bearing Status   Weight-Bearing Status (Right): weight-bearing as tolerated    Assistive device used: none    Ambulation: Level Surfaces   Ambulation without assistive device: independent    Ambulation: Stairs Pattern: non-reciprocal  Railings: one rail  Pattern: non-reciprocal  Railings: one rail    Observational Gait   Decreased walking speed  Quality of Movement During Gait     Knee    Knee (Right): Positive increased ER tibial torsion  General Comments:    Lower quarter screen   Hips: unremarkable    Knee Comments  Swelling ankle L: 22 2 cm, R 23 8      Ankle/Foot Comments   Swelling RLE, advised to elevate more at home                Precautions: OA, B shld sx, B knee scope, Back pain, R TKR 12/2021      Manuals 10/24            R knee 10'                                                   Neuro Re-Ed                                       Ther Ex             HEP             R heel slides 30x            Hip adduction Band blue 30x            Hip adduction Ball 30x            R SLR 2x15            R SAQ 30x            R LAQ 30x            Slant board nv            Nustep 8' L4                                                                Ther Activity                                       Gait Training                                       Modalities

## 2022-10-24 NOTE — LETTER
2022    Neal Bosch MD  [address] [facility] [address]    Patient: Ashwini Leal   YOB: 1955   Date of Visit: 10/24/2022     Encounter Diagnosis     ICD-10-CM    1  S/P revision of total knee, right  Z96 651    2  Acute pain of right knee  M25 561        Dear Dr Bubba Preston: Thank you for your recent referral of Ashwini Leal  Please review the attached evaluation summary from [facility] recent visit  Please verify that you agree with the plan of care by signing the attached order  If you have any questions or concerns, please do not hesitate to call  I sincerely appreciate the opportunity to share in the care of one of your patients and hope to have another opportunity to work with you in the near future  Sincerely,    Alexsandra Jensen, PT      Referring Provider:      I certify that I have read the below Plan of Care and certify the need for these services furnished under this plan of treatment while under my care  Neal Bosch MD  [email or web address] [facility] [address]  Via Fax: [phone]          PT Evaluation     Today's date: 10/24/2022  Patient name: Ashwini Leal  : 1955  MRN: [id number]  Referring provider: Joselito Pulliam MD  Dx:   Encounter Diagnosis     ICD-10-CM    1  S/P revision of total knee, right  Z96 651    2  Acute pain of right knee  M25 561                   Assessment/Plan    Subjective Evaluation    History of Present Illness  Date of surgery: 10/18/2022  Mechanism of injury: surgery  Mechanism of injury: Patient had elective right TKR last week            Not a recurrent problem   Quality of life: excellent    Pain  Current pain ratin  At worst pain ratin  Location: R knee   Quality: dull ache and discomfort  Relieving factors: rest and ice  Aggravating factors: walking and stair climbing  Progression: improved    Social Support  Steps to enter house: no  Stairs in house: no   Lives in: [facility]  Lives with: spouse    Employment status: not working  Patient Goals  Patient goals for therapy: decreased pain, increased motion, increased strength and return to sport/leisure activities  Patient goal: able to kneel, snowboard, kayak        Objective           Precautions: OA, B shld sx, B knee scope, Back pain, R TKR 12/2021      Manuals 10/24            R knee 10'                                                   Neuro Re-Ed                                       Ther Ex             HEP             R heel slides 30x            Hip adduction Band blue 30x            Hip adduction Ball 30x            R SLR 2x15            R SAQ 30x            R LAQ 30x                                                                                          Ther Activity                                       Gait Training                                       Modalities

## 2022-10-24 NOTE — PROGRESS NOTES
PT Evaluation     Today's date: 10/24/2022  Patient name: Suki Romo  : 1955  MRN: [id number]  Referring provider: Lakisha Russo MD  Dx:   Encounter Diagnosis     ICD-10-CM    1  S/P revision of total knee, right  Z96 651    2  Acute pain of right knee  M25 561        Start Time: 1001  Stop Time: 1057  Total time in clinic (min): 56 minutes    Assessment  Assessment details: Suki Romo is a 75 y o  male who presents with pain, decreased strength, decreased ROM and swelling RLE  Due to these impairments, Patient has difficulty performing a/iadls and recreational activities  Patient's clinical presentation is consistent with their referring diagnosis of s/p RTKR  Patient comes to PT with no AD 6 days post op  He has severe ecchymosis posterior and medial right thigh  Patient was instructed in ice and elevation to RLE for HEP  Patient would benefit from skilled physical therapy to address their aforementioned impairments, improve their level of function and to improve their overall quality of life  Impairments: abnormal or restricted ROM, activity intolerance, impaired physical strength, lacks appropriate home exercise program, pain with function, poor posture  and poor body mechanics    Symptom irritability: moderateUnderstanding of Dx/Px/POC: excellent  Goals  ST-3 WEEKS  1  Decrease pain right knee < 2/10 on VAS at its worst   2   Increase ROM right knee flexion > 115    3   Increase right knee strength > 4+/5   4  Able to perform reciprocal stairs  LT-6 WEEKS  1  Patient to be independent with a/iadls  2  Increase functional activities for leisure and home activities to previous LOF    3  Independent with HEP and/or fitness program     Plan  Patient would benefit from: skilled physical therapy  Planned modality interventions: cryotherapy, thermotherapy: hydrocollator packs and unattended electrical stimulation  Planned therapy interventions: activity modification, behavior modification, body mechanics training, flexibility, functional ROM exercises, home exercise program, IADL retraining, joint mobilization, manual therapy, neuromuscular re-education, patient education, postural training, strengthening, stretching, therapeutic activities, therapeutic exercise and balance/weight bearing training  Frequency: 2-3x week  Duration in weeks: 8  Plan of Care beginning date: 10/24/2022  Plan of Care expiration date: 2022  Treatment plan discussed with: patient        Subjective Evaluation    History of Present Illness  Date of surgery: 10/18/2022  Mechanism of injury: surgery  Mechanism of injury: Patient had elective right TKR last week  He progressed to no AD, has been walking a lot  Only c/o pain anterior knee  Not a recurrent problem   Quality of life: excellent    Pain  Current pain ratin  At worst pain ratin  Location: R knee   Quality: dull ache and discomfort  Relieving factors: rest and ice  Aggravating factors: walking and stair climbing  Progression: improved    Social Support  Steps to enter house: no  Stairs in house: no   Lives in: [facility]  Lives with: spouse    Employment status: not working  Patient Goals  Patient goals for therapy: decreased pain, increased motion, increased strength and return to sport/leisure activities  Patient goal: able to kneel, snowboard, kayak        Objective     Observations     Right Knee   Positive for edema, effusion and incision  Additional Observation Details  Severe eccymosis posterior and medial right thigh  28cm non removable dressing over incision  Patient was instructed by MD to remove tomorrow         Neurological Testing     Sensation     Knee   Left Knee   Intact: light touch    Right Knee   Intact: light touch     Additional Neurological Details  Patient denies any radicular symptoms in RLE    Active Range of Motion   Left Knee   Flexion: 123 degrees   Extension: 0 degrees     Right Knee   Flexion: 95 degrees with pain  Extension: -4 degrees     Mobility   Patellar Mobility:     Right Knee   WFL: medial, lateral, superior and inferior    Strength/Myotome Testing     Left Knee   Normal strength    Additional Strength Details  rigtht knee not tested due to recent surgery  Swelling     Left Knee Girth Measurement (cm)   Joint line: 36 1 cm  10 cm above joint line: 39 (supra patella) cm    Right Knee Girth Measurement (cm)   Joint line: 40 5 cm  10 cm above joint line: 46 (supra patella) cm    Ambulation   Weight-Bearing Status   Weight-Bearing Status (Right): weight-bearing as tolerated    Assistive device used: none    Ambulation: Level Surfaces   Ambulation without assistive device: independent    Ambulation: Stairs   Pattern: non-reciprocal  Railings: one rail  Pattern: non-reciprocal  Railings: one rail    Observational Gait   Decreased walking speed  Quality of Movement During Gait     Knee    Knee (Right): Positive increased ER tibial torsion  General Comments:    Lower quarter screen   Hips: unremarkable    Knee Comments  Swelling ankle L: 22 2 cm, R 23 8      Ankle/Foot Comments   Swelling RLE, advised to elevate more at home                Precautions: OA, B shld sx, B knee scope, Back pain, R TKR 12/2021      Manuals 10/24            R knee 10'                                                   Neuro Re-Ed                                       Ther Ex             HEP             R heel slides 30x            Hip adduction Band blue 30x            Hip adduction Ball 30x            R SLR 2x15            R SAQ 30x            R LAQ 30x            Slant board nv            Nustep 8' L4                                                                Ther Activity                                       Gait Training                                       Modalities

## 2022-10-27 ENCOUNTER — OFFICE VISIT (OUTPATIENT)
Dept: PHYSICAL THERAPY | Age: 67
End: 2022-10-27
Payer: MEDICARE

## 2022-10-27 DIAGNOSIS — M25.561 ACUTE PAIN OF RIGHT KNEE: ICD-10-CM

## 2022-10-27 DIAGNOSIS — Z96.651 S/P REVISION OF TOTAL KNEE, RIGHT: Primary | ICD-10-CM

## 2022-10-27 PROCEDURE — 97140 MANUAL THERAPY 1/> REGIONS: CPT | Performed by: PHYSICAL THERAPIST

## 2022-10-27 PROCEDURE — 97110 THERAPEUTIC EXERCISES: CPT | Performed by: PHYSICAL THERAPIST

## 2022-10-27 NOTE — PROGRESS NOTES
Daily Note     Today's date: 10/27/2022  Patient name: South Lee  : 1955  MRN: [id number]  Referring provider: Micaela Butt MD  Dx:   Encounter Diagnosis     ICD-10-CM    1  S/P revision of total knee, right  Z96 651    2  Acute pain of right knee  M25 561        Start Time: 1602  Stop Time: 1710  Total time in clinic (min): 68 minutes    Subjective: Patient reports his pain is less  He took the dressing off incision  Objective: See treatment diary below      Assessment: Tolerated treatment well  Patient would benefit from continued PT ecchymosis decreased in right thigh and decreased swelling  Plan: Continue per plan of care        Precautions: OA, B shld sx, B knee scope, Back pain, R TKR 2021      Manuals 10/24 10/27           R knee 10' 10'                                                  Neuro Re-Ed             SLS R             Side stepping             Monster walks                                                    Ther Ex             HEP             R heel slides 30x 30x           Hip adduction Band blue 30x 40# 30x           Hip adduction Ball 30x 40# 30x           R SLR 2x15 3# 30x           R SAQ 30x 3# 30x           R LAQ 30x 3# 30x           Slant board nv L3 30"4x           Nustep 8' L4 8' L 5           bike  5'                                                  Ther Activity             Step ups             Lat step downs             Gait Training                                       Modalities
normal...

## 2022-11-02 ENCOUNTER — OFFICE VISIT (OUTPATIENT)
Dept: PHYSICAL THERAPY | Age: 67
End: 2022-11-02

## 2022-11-02 DIAGNOSIS — M25.561 ACUTE PAIN OF RIGHT KNEE: ICD-10-CM

## 2022-11-02 DIAGNOSIS — Z96.651 S/P REVISION OF TOTAL KNEE, RIGHT: Primary | ICD-10-CM

## 2022-11-02 NOTE — PROGRESS NOTES
Daily Note     Today's date: 2022  Patient name: Saji Kathleen  : 1955  MRN: [id number]  Referring provider: Lee Dias MD  Dx:   No diagnosis found  Subjective:  No complaints upon arrival  Pt reports and increased varicos vein with TTP to the area, plan on messaging the doc with and image regarding this  Objective: See treatment diary below  0-120 PROM       Assessment: Good tolerance to progression in PRES  ROM and strength progressing nicely  Patent offers no complaints, would benefit from continued PT  Plan: Continue per plan of care        Precautions: OA, B shld sx, B knee scope, Back pain, R TKR 2021      Manuals 10/24 10/27 11/2          R knee 10' 10' 10'                                                 Neuro Re-Ed             SLS R             Side stepping   NV          Monster walks   NV                                                 Ther Ex             HEP             R heel slides 30x 30x 30x strap OP          Hip adduction Band blue 30x 40# 30x 55# 30x          Hip adduction Ball 30x 40# 30x 55# 30x          R SLR 2x15 3# 30x 3# 30x          R SAQ 30x 3# 30x 3# 30x          R LAQ 30x 3# 30x 3# 30x          Slant board nv L3 30"4x L3 30"x4          Nustep 8' L4 8' L 5 8' L5          bike  5' 5'          TKE   BLK 20x          Mini squat   20x          Knee flexion lunge stretch   2" x10                       Ther Activity             Step ups   8 in 20x          Heel taps    4in 20x          Lat step ups   8 in 20x          Gait Training                                       Modalities

## 2022-11-04 ENCOUNTER — OFFICE VISIT (OUTPATIENT)
Dept: PHYSICAL THERAPY | Age: 67
End: 2022-11-04

## 2022-11-04 DIAGNOSIS — Z96.651 S/P REVISION OF TOTAL KNEE, RIGHT: Primary | ICD-10-CM

## 2022-11-04 DIAGNOSIS — M25.561 ACUTE PAIN OF RIGHT KNEE: ICD-10-CM

## 2022-11-04 NOTE — PROGRESS NOTES
Daily Note     Today's date: 2022  Patient name: Agnes Daigle  : 1955  MRN: [id number]  Referring provider: Eduardo Randhawa MD  Dx:   Encounter Diagnosis     ICD-10-CM    1  S/P revision of total knee, right  Z96 651    2  Acute pain of right knee  M25 561                   Subjective: Had dopler perfomred yesterday secondary to calf tenderness, negative for DVT  Compliant with HEP, no complaints  Objective: See treatment diary below  0-120 PROM       Assessment: ROM and strength progressing nicely  Reviewed scar tissue massage to add as per of HEP  Good tolerance to added TE this visit  Patent offers no complaints, would benefit from continued PT  Plan: Continue per plan of care        Precautions: OA, B shld sx, B knee scope, Back pain, R TKR 2021      Manuals 10/24 10/27 11/2 11/4         R knee 10' 10' 10' 10'                                                Neuro Re-Ed             SLS R             Side stepping   NV GTB 3 laps         Monster walks   NV GTB 3 laps                                                Ther Ex             HEP             R heel slides 30x 30x 30x strap OP 30x strap         Hip adduction Band blue 30x 40# 30x 55# 30x 70#/30x         Hip adduction Ball 30x 40# 30x 55# 30x 70# 30x         R SLR 2x15 3# 30x 3# 30x 3# 30x         R SAQ 30x 3# 30x 3# 30x 3# 30x         R LAQ 30x 3# 30x 3# 30x 3# 30x         Slant board nv L3 30"4x L3 30"x4 L3 30"x4         Nustep 8' L4 8' L 5 8' L5          bike  5' 5' 10'         TKE   BLK 20x BLK 30x         Mini squat   20x 30x         Knee flexion lunge stretch   2" x10                       Ther Activity             Step ups   8 in 20x 8inc 20x         Heel taps    4in 20x 4 in 20x         Lat step ups   8 in 20x 8in 20x         Gait Training                                       Modalities

## 2022-11-08 ENCOUNTER — OFFICE VISIT (OUTPATIENT)
Dept: PHYSICAL THERAPY | Age: 67
End: 2022-11-08

## 2022-11-08 DIAGNOSIS — Z96.651 S/P REVISION OF TOTAL KNEE, RIGHT: Primary | ICD-10-CM

## 2022-11-08 DIAGNOSIS — M25.561 ACUTE PAIN OF RIGHT KNEE: ICD-10-CM

## 2022-11-08 NOTE — PROGRESS NOTES
Daily Note     Today's date: 2022  Patient name: Nate Flaherty  : 1955  MRN: [id number]  Referring provider: Louie Garcia MD  Dx:   Encounter Diagnosis     ICD-10-CM    1  S/P revision of total knee, right  Z96 651    2  Acute pain of right knee  M25 561        Start Time: 1037  Stop Time: 1128  Total time in clinic (min): 51 minutes    Subjective: Patient reports he had the doppler last week on right calf and was negative  He states his knee feels best when walking but sedentary his knee aches a lot  He is going to the gym and doing "light workouts"  Active with walking  Objective: See treatment diary below      Assessment: Tolerated treatment well  Patient would benefit from continued PT  Patient cued to do proper technique and not do exercises incorrectly he finds how much weaker his right knee is and how much he was relying on the left leg to get the job done  Patient needs to be held back with increasing his weights as he is using other musculature to move the machine  Tight TKE, some scar tissue noted superior incision and distal vastus lateralis  He walks with RLE abducted a little and cues to keep leg in neutral plane  He is able to do so when concentrates on such  Plan: Continue per plan of care        Precautions: OA, B shld sx, B knee scope, Back pain, R TKR 2021      Manuals 10/24 10/27 11/2 11/4 11/8        R knee 10' 10' 10' 10' 10'                     PROM     116°                     Neuro Re-Ed             SLS R             Side stepping   NV GTB 3 laps 3x blue        Monster walks   NV GTB 3 laps Blue 3x        pods     nv                                  Ther Ex             HEP             R heel slides 30x 30x 30x strap OP 30x strap 30x        Hip adduction Band blue 30x 40# 30x 55# 30x 70#0 30x 70# 30x        Hip adduction Ball 30x 40# 30x 55# 30x 70# 30x 70# 30x        R SLR 2x15 3# 30x 3# 30x 3# 30x 4# 30x        R SAQ 30x 3# 30x 3# 30x 3# 30x 6# 30x R LAQ 30x 3# 30x 3# 30x 3# 30x 6# 30x        Slant board nv L3 30"4x L3 30"x4 L3 30"x4 30" 4x        Nustep 8' L4 8' L 5 8' L5  8' L6        bike  5' 5' 10' 10        TKE   BLK 20x BLK 30x Ball 30x        Mini squat   20x 30x         Knee flexion lunge stretch   2" x10                       Ther Activity             Step ups   8 in 20x 8inc 20x         Heel taps    4in 20x 4 in 20x         Lat step downs   8 in 20x 8in 20x 4" 30x        Gait Training                                       Modalities

## 2022-11-11 ENCOUNTER — OFFICE VISIT (OUTPATIENT)
Dept: PHYSICAL THERAPY | Age: 67
End: 2022-11-11

## 2022-11-11 DIAGNOSIS — Z96.651 S/P REVISION OF TOTAL KNEE, RIGHT: Primary | ICD-10-CM

## 2022-11-11 DIAGNOSIS — M25.561 ACUTE PAIN OF RIGHT KNEE: ICD-10-CM

## 2022-11-11 NOTE — PROGRESS NOTES
Daily Note     Today's date: 2022  Patient name: Vannessa Salinas  : 1955  MRN: [id number]  Referring provider: Hugo Andrew MD  Dx:   Encounter Diagnosis     ICD-10-CM    1  S/P revision of total knee, right  Z96 651    2  Acute pain of right knee  M25 561        Start Time: 815  Stop Time: 915  Total time in clinic (min): 60 minutes    Subjective: Patient reports less swelling and increased function since recent flare-up  Objective: See treatment diary below      Assessment: Patient tolerated treatment well  Continued to provide vc to activate posterior chain and reduce compensatory strategies  Challenged with step downs, vc for adequate hip hinging  Patient offers no complaints post session, would benefit from continued PT  Plan: Continue per plan of care        Precautions: OA, B shld sx, B knee scope, Back pain, R TKR 2021      Manuals 10/24 10/27 11/2 11/4 11/8 11/11       R knee 10' 10' 10' 10' 10' 10'                    PROM     116° 118                    Neuro Re-Ed             SLS R             Side stepping   NV GTB 3 laps 3x blue 3x blue       Monster walks   NV GTB 3 laps Blue 3x Blue 3x       pods     nv                                  Ther Ex             HEP             R heel slides 30x 30x 30x strap OP 30x strap 30x 30x strap       Hip adduction Band blue 30x 40# 30x 55# 30x 70#0 30x 70# 30x 70# 30x       Hip adduction Ball 30x 40# 30x 55# 30x 70# 30x 70# 30x 70# 30x       R SLR 2x15 3# 30x 3# 30x 3# 30x 4# 30x 4# 30x       R SAQ 30x 3# 30x 3# 30x 3# 30x 6# 30x 6# 30x       R LAQ 30x 3# 30x 3# 30x 3# 30x 6# 30x 6# 30x       Slant board nv L3 30"4x L3 30"x4 L3 30"x4 30" 4x 30"x4       Nustep 8' L4 8' L 5 8' L5  8' L6        bike  5' 5' 10' 10 10'       TKE   BLK 20x BLK 30x Ball 30x Blk 30x       Mini squat   20x 30x  30x       Knee flexion lunge stretch   2" x10                       Ther Activity             Step ups   8 in 20x 8inc 20x  8in 20x       Heel taps 4in 20x 4 in 20x  4 in 20x       Lat step downs   8 in 20x 8in 20x 4" 30x        Gait Training                                       Modalities

## 2022-11-14 ENCOUNTER — OFFICE VISIT (OUTPATIENT)
Dept: PHYSICAL THERAPY | Age: 67
End: 2022-11-14

## 2022-11-14 DIAGNOSIS — Z96.651 S/P REVISION OF TOTAL KNEE, RIGHT: Primary | ICD-10-CM

## 2022-11-14 DIAGNOSIS — M25.561 ACUTE PAIN OF RIGHT KNEE: ICD-10-CM

## 2022-11-14 NOTE — PROGRESS NOTES
Daily Note     Today's date: 2022  Patient name: Zach Pascual  : 1955  MRN: [id number]  Referring provider: Tim Dejesus MD  Dx:   Encounter Diagnosis     ICD-10-CM    1  S/P revision of total knee, right  Z96 651    2  Acute pain of right knee  M25 561        Start Time: 1005  Stop Time: 1058  Total time in clinic (min): 53 minutes    Subjective: Patient reports he is doing good  He is seeing MD this week  Objective: See treatment diary below      Assessment: Tolerated treatment well  Patient would benefit from continued PT  Tight end range knee flexion  Atrophy still noted RLE compared to left  2 mod thick scabs still present in incision, no drainage  Progressing well  Cues for RLE positioning in neutral        Plan: Continue per plan of care        Precautions: OA, B shld sx, B knee scope, Back pain, R TKR 2021      Manuals 10/24 10/27 11/2 11/4 11/8 11/11 11/14      R knee 10' 10' 10' 10' 10' 10' 10                   PROM     116° 118 115                   Neuro Re-Ed             SLS R             Side stepping   NV GTB 3 laps 3x blue 3x blue 3x      Monster walks   NV GTB 3 laps Blue 3x Blue 3x 3x      pods     nv                                  Ther Ex             HEP             R heel slides 30x 30x 30x strap OP 30x strap 30x 30x strap 30x      Hip adduction Band blue 30x 40# 30x 55# 30x 70#0 30x 70# 30x 70# 30x 70# 30x      Hip adduction Ball 30x 40# 30x 55# 30x 70# 30x 70# 30x 70# 30x 70# 30x      R SLR 2x15 3# 30x 3# 30x 3# 30x 4# 30x 4# 30x 3# 30x      R SAQ 30x 3# 30x 3# 30x 3# 30x 6# 30x 6# 30x 5# 30x      R LAQ 30x 3# 30x 3# 30x 3# 30x 6# 30x 6# 30x 5# 30x      Slant board nv L3 30"4x L3 30"x4 L3 30"x4 30" 4x 30"x4 4x      Nustep 8' L4 8' L 5 8' L5  8' L6        bike  5' 5' 10' 10 10' 10'      TKE   BLK 20x BLK 30x Ball 30x Blk 30x 30x ball      Mini squat   20x 30x  30x       Knee flexion lunge stretch   2" x10                       Ther Activity             Step ups 8 in 20x 8inc 20x  8in 20x       Heel taps step downs   4in 20x 4 in 20x  4 in 20x 30x 4"      Lat step downs   8 in 20x 8in 20x 4" 30x        Gait Training                                       Modalities

## 2022-11-14 NOTE — PROGRESS NOTES
Daily Note     Today's date: 2022  Patient name: Garth Mcarthur  : 1955  MRN: [id number]  Referring provider: Samuel Menendez MD  Dx:   Encounter Diagnosis     ICD-10-CM    1  S/P revision of total knee, right  Z96 651    2  Acute pain of right knee  M25 561                   Subjective: ***      Objective: See treatment diary below      Assessment: Tolerated treatment {Tolerated treatment :[id number]}   Patient {assessment:[id number]}      Plan: {PLAN:1286694214}     Precautions: OA, B shld sx, B knee scope, Back pain, R TKR 2021      Manuals 10/24 10/27 11/2 11/4 11/8 11/11       R knee 10' 10' 10' 10' 10' 10'                    PROM     116° 118                    Neuro Re-Ed             SLS R             Side stepping   NV GTB 3 laps 3x blue 3x blue       Monster walks   NV GTB 3 laps Blue 3x Blue 3x       pods     nv                                  Ther Ex             HEP             R heel slides 30x 30x 30x strap OP 30x strap 30x 30x strap       Hip adduction Band blue 30x 40# 30x 55# 30x 70#0 30x 70# 30x 70# 30x       Hip adduction Ball 30x 40# 30x 55# 30x 70# 30x 70# 30x 70# 30x       R SLR 2x15 3# 30x 3# 30x 3# 30x 4# 30x 4# 30x       R SAQ 30x 3# 30x 3# 30x 3# 30x 6# 30x 6# 30x       R LAQ 30x 3# 30x 3# 30x 3# 30x 6# 30x 6# 30x       Slant board nv L3 30"4x L3 30"x4 L3 30"x4 30" 4x 30"x4       Nustep 8' L4 8' L 5 8' L5  8' L6        bike  5' 5' 10' 10 10'       TKE   BLK 20x BLK 30x Ball 30x Blk 30x       Mini squat   20x 30x  30x       Knee flexion lunge stretch   2" x10                       Ther Activity             Step ups   8 in 20x 8inc 20x  8in 20x       Heel taps step downs   4in 20x 4 in 20x  4 in 20x       Lat step downs   8 in 20x 8in 20x 4" 30x        Gait Training                                       Modalities

## 2022-11-16 ENCOUNTER — OFFICE VISIT (OUTPATIENT)
Dept: PHYSICAL THERAPY | Age: 67
End: 2022-11-16

## 2022-11-16 DIAGNOSIS — Z96.651 S/P REVISION OF TOTAL KNEE, RIGHT: Primary | ICD-10-CM

## 2022-11-16 DIAGNOSIS — M25.561 ACUTE PAIN OF RIGHT KNEE: ICD-10-CM

## 2022-11-16 NOTE — PROGRESS NOTES
Daily Note     Today's date: 2022  Patient name: Trinity Felix  : 1955  MRN: [id number]  Referring provider: Zoraida Flores MD  Dx:   Encounter Diagnosis     ICD-10-CM    1  S/P revision of total knee, right  Z96 651       2  Acute pain of right knee  M25 561                      Subjective: No new complaints  Swelling persisting  Objective: See treatment diary below      Assessment: Good tolerance to outlined TE program and additions  Form improving with step downs as patient is able to perform with more control and minimal compensation  Added prone quad sets to focus on gaining active extension  Patient offers no complaints post session would benefit from continued PT  Plan: Continue per plan of care        Precautions: OA, B shld sx, B knee scope, Back pain, R TKR 2021      Manuals 10/24 10/27 11/2 11/4 11/8 11/11 11/14 11/16     R knee 10' 10' 10' 10' 10' 10' 10                   PROM     116° 118 115 118                  Neuro Re-Ed             SLS R             Side stepping   NV GTB 3 laps 3x blue 3x blue 3x 3x     Monster walks   NV GTB 3 laps Blue 3x Blue 3x 3x 3x     pods     nv                                  Ther Ex             HEP             R heel slides 30x 30x 30x strap OP 30x strap 30x 30x strap 30x 30x     Hip adduction Band blue 30x 40# 30x 55# 30x 70#0 30x 70# 30x 70# 30x 70# 30x 70# 30x     Hip adduction Ball 30x 40# 30x 55# 30x 70# 30x 70# 30x 70# 30x 70# 30x 70# 30x     R SLR 2x15 3# 30x 3# 30x 3# 30x 4# 30x 4# 30x 3# 30x 3# 30x     R SAQ 30x 3# 30x 3# 30x 3# 30x 6# 30x 6# 30x 5# 30x 5#/30     R LAQ 30x 3# 30x 3# 30x 3# 30x 6# 30x 6# 30x 5# 30x 5#/30     Slant board nv L3 30"4x L3 30"x4 L3 30"x4 30" 4x 30"x4 4x 4x     Nustep 8' L4 8' L 5 8' L5  8' L6        bike  5' 5' 10' 10 10' 10' 10'     TKE   BLK 20x BLK 30x Ball 30x Blk 30x 30x ball 30x ball     Mini squat   20x 30x  30x       Knee flexion lunge stretch   2" x10          Prone quad sets        20x Ther Activity             Step ups   8 in 20x 8inc 20x  8in 20x       Heel taps step downs   4in 20x 4 in 20x  4 in 20x 30x 4" 30x 4"     Lat step downs   8 in 20x 8in 20x 4" 30x   4" 20x     Gait Training                                       Modalities

## 2022-11-17 ENCOUNTER — APPOINTMENT (OUTPATIENT)
Dept: PHYSICAL THERAPY | Age: 67
End: 2022-11-17

## 2022-11-21 ENCOUNTER — EVALUATION (OUTPATIENT)
Dept: PHYSICAL THERAPY | Age: 67
End: 2022-11-21

## 2022-11-21 DIAGNOSIS — M25.561 ACUTE PAIN OF RIGHT KNEE: ICD-10-CM

## 2022-11-21 DIAGNOSIS — Z96.651 S/P REVISION OF TOTAL KNEE, RIGHT: Primary | ICD-10-CM

## 2022-11-21 NOTE — PROGRESS NOTES
PT Re-Evaluation     Today's date: 2022  Patient name: Erika Hardin  : 1955  MRN: [id number]  Referring provider: Sandra Buchanan MD  Dx:   Encounter Diagnosis     ICD-10-CM    1  S/P revision of total knee, right  Z96 651       2  Acute pain of right knee  M25 561           Start Time: 1000  Stop Time: 1058  Total time in clinic (min): 58 minutes    Assessment  Assessment details: Erika Hardin is a 75 y o  male who presents with pain, decreased strength, decreased ROM and swelling RLE  Due to these impairments, Patient has difficulty performing a/iadls and recreational activities  Patient's clinical presentation is consistent with their referring diagnosis of s/p RTKR  Patient has been doing PT and making large gains in PT  He just started an antiinflammatory last week and feeing much better  Patient would benefit from skilled physical therapy to address their aforementioned impairments, improve their level of function and to improve their overall quality of life  Impairments: abnormal or restricted ROM, activity intolerance, impaired physical strength, lacks appropriate home exercise program and pain with function    Symptom irritability: lowUnderstanding of Dx/Px/POC: excellent  Goals  ST-3 WEEKS  1  Decrease pain right knee < 2/10 on VAS at its worst  Progress made  Ongoing goal to max potential    2   Increase ROM right knee flexion > 115  Progress made  Ongoing goal to max potential    3   Increase right knee strength > 4+/5  Progress made  Ongoing goal to max potential    4  Able to perform reciprocal stairs  Goal met  Progress made  Ongoing goal to max potential      LT-6 WEEKS  1  Patient to be independent with a/iadls  Ongoing goal  2  Increase functional activities for leisure and home activities to previous LOF  Ongoing goal  3   Independent with HEP and/or fitness program  Ongoing goal    Plan  Patient would benefit from: skilled physical therapy  Planned modality interventions: cryotherapy, thermotherapy: hydrocollator packs and unattended electrical stimulation  Planned therapy interventions: activity modification, behavior modification, body mechanics training, flexibility, functional ROM exercises, home exercise program, IADL retraining, joint mobilization, manual therapy, neuromuscular re-education, patient education, postural training, strengthening, stretching, therapeutic activities, therapeutic exercise and balance/weight bearing training  Frequency: 2-3x week  Duration in weeks: 4  Plan of Care beginning date: 10/24/2022  Plan of Care expiration date: 2022  Treatment plan discussed with: patient        Subjective Evaluation    History of Present Illness  Date of surgery: 10/18/2022  Mechanism of injury: surgery  Mechanism of injury: Patient had elective right TKR last week  He progressed to no AD, has been walking a lot  Only c/o pain anterior knee  22: Patient started anti-inflammatory meds last week and feeling much better  He is happy with his progress  Has follow up with MD Wednesday  Not a recurrent problem   Quality of life: excellent    Pain  Current pain ratin  At worst pain rating: 3  Location: R knee   Quality: dull ache and discomfort  Relieving factors: rest and ice  Aggravating factors: walking and stair climbing  Progression: improved    Social Support  Steps to enter house: no  Stairs in house: no   Lives in: [place]  Lives with: spouse    Employment status: not working  Patient Goals  Patient goals for therapy: decreased pain, increased motion, increased strength and return to sport/leisure activities  Patient goal: able to kneel, snowboard, kayak        Objective     Observations     Right Knee   Positive for effusion and incision  Additional Observation Details  Incision closed and healing well  Some scar tissue noted distal vastus lateralis with tenderness        Neurological Testing     Sensation     Knee Right Knee   Intact: light touch     Active Range of Motion   Left Knee   Flexion: 123 degrees   Extension: 0 degrees     Right Knee   Flexion: 115 degrees with pain  Extension: -4 degrees     Mobility   Patellar Mobility:     Right Knee   WFL: medial, lateral, superior and inferior    Strength/Myotome Testing     Left Knee   Normal strength    Right Knee   Flexion: 5  Extension: 5  Quadriceps contraction: good    Swelling     Left Knee Girth Measurement (cm)   10 cm above joint line: supra patella  Right Knee Girth Measurement (cm)   10 cm above joint line: supra patella  Ambulation   Weight-Bearing Status   Weight-Bearing Status (Right): weight-bearing as tolerated    Assistive device used: none    Ambulation: Level Surfaces   Ambulation without assistive device: independent    Ambulation: Stairs   Pattern: reciprocal  Railings: without rails  Pattern: reciprocal  Railings: without rails    Observational Gait   Gait: within functional limits     General Comments:    Lower quarter screen   Hips: unremarkable    Knee Comments    Swelling decreased superior right knee               Precautions: OA, B shld sx, B knee scope, Back pain, R TKR 12/2021      Manuals 10/24 10/27 11/2 11/4 11/8 11/11 11/14 11/16 11/21    R knee 10' 10' 10' 10' 10' 10' 10  8                 PROM     116° 118 115 118 115P                 Neuro Re-Ed             SLS R             Side stepping   NV GTB 3 laps 3x blue 3x blue 3x 3x blk 3x    Monster walks   NV GTB 3 laps Blue 3x Blue 3x 3x 3x blk 3x    pods     nv                                  Ther Ex             HEP             Bridge w/ball squeeze         30x    R heel slides 30x 30x 30x strap OP 30x strap 30x 30x strap 30x 30x     Hip adduction Band blue 30x 40# 30x 55# 30x 70#0 30x 70# 30x 70# 30x 70# 30x 70# 30x 70# 30x    Hip adduction Ball 30x 40# 30x 55# 30x 70# 30x 70# 30x 70# 30x 70# 30x 70# 30x 70# 30x    R SLR 2x15 3# 30x 3# 30x 3# 30x 4# 30x 4# 30x 3# 30x 3# 30x 4# 30x R SAQ 30x 3# 30x 3# 30x 3# 30x 6# 30x 6# 30x 5# 30x 5#/30 6# x30x    R LAQ 30x 3# 30x 3# 30x 3# 30x 6# 30x 6# 30x 5# 30x 5#/30 6#  30x    Slant board nv L3 30"4x L3 30"x4 L3 30"x4 30" 4x 30"x4 4x 4x 4x    Nustep 8' L4 8' L 5 8' L5  8' L6        bike  5' 5' 10' 10 10' 10' 10' 10' L3    Leg press pad 6         90# 30x    TKE   BLK 20x BLK 30x Ball 30x Blk 30x 30x ball 30x ball 30x    Mini squat   20x 30x  30x       Knee flexion lunge stretch   2" x10          Prone quad sets        20x     Ther Activity             Step ups   8 in 20x 8inc 20x  8in 20x       Heel taps step downs   4in 20x 4 in 20x  4 in 20x 30x 4" 30x 4" 30" 6"    Lat step downs   8 in 20x 8in 20x 4" 30x   4" 20x     Gait Training                                       Modalities

## 2022-11-21 NOTE — LETTER
2022    Susana Obando MD  [address] [facility] [address] [facility] [address]    Patient: Sherrill Archibald   YOB: 1955   Date of Visit: 2022     Encounter Diagnosis     ICD-10-CM    1  S/P revision of total knee, right  Z96 651       2  Acute pain of right knee  M25 561           Dear Dr Clementina Murrell: Thank you for your recent referral of Sherrill Archibald  Please review the attached evaluation summary from [facility] recent visit  Please verify that you agree with the plan of care by signing the attached order  If you have any questions or concerns, please do not hesitate to call  I sincerely appreciate the opportunity to share in the care of one of your patients and hope to have another opportunity to work with you in the near future  Sincerely,    Lena Morrell, PT      Referring Provider:      I certify that I have read the below Plan of Care and certify the need for these services furnished under this plan of treatment while under my care  Susana Obando MD  [address] [facility] [address]  Via Fax: [phone]          PT Re-Evaluation     Today's date: 2022  Patient name: Sherrill Archibald  : 1955  MRN: [id number]  Referring provider: Rafi Bland MD  Dx:   Encounter Diagnosis     ICD-10-CM    1  S/P revision of total knee, right  Z96 651       2  Acute pain of right knee  M25 561           Start Time: 1000  Stop Time: 1058  Total time in clinic (min): 58 minutes    Assessment  Assessment details: Sherrill Archibald is a 75 y o  male who presents with pain, decreased strength, decreased ROM and swelling RLE  Due to these impairments, Patient has difficulty performing a/iadls and recreational activities  Patient's clinical presentation is consistent with their referring diagnosis of s/p RTKR   Patient has been doing PT and making large gains in PT  He just started an antiinflammatory last week and feeing much better  Patient would benefit from skilled physical therapy to address their aforementioned impairments, improve their level of function and to improve their overall quality of life  Impairments: abnormal or restricted ROM, activity intolerance, impaired physical strength, lacks appropriate home exercise program and pain with function    Symptom irritability: lowUnderstanding of Dx/Px/POC: excellent  Goals  ST-3 WEEKS  1  Decrease pain right knee < 2/10 on VAS at its worst  Progress made  Ongoing goal to max potential    2   Increase ROM right knee flexion > 115  Progress made  Ongoing goal to max potential    3   Increase right knee strength > 4+/5  Progress made  Ongoing goal to max potential    4  Able to perform reciprocal stairs  Goal met  Progress made  Ongoing goal to max potential      LT-6 WEEKS  1  Patient to be independent with a/iadls  Ongoing goal  2  Increase functional activities for leisure and home activities to previous LOF  Ongoing goal  3  Independent with HEP and/or fitness program  Ongoing goal    Plan  Patient would benefit from: skilled physical therapy  Planned modality interventions: cryotherapy, thermotherapy: hydrocollator packs and unattended electrical stimulation  Planned therapy interventions: activity modification, behavior modification, body mechanics training, flexibility, functional ROM exercises, home exercise program, IADL retraining, joint mobilization, manual therapy, neuromuscular re-education, patient education, postural training, strengthening, stretching, therapeutic activities, therapeutic exercise and balance/weight bearing training  Frequency: 2-3x week    Duration in weeks: 4  Plan of Care beginning date: 10/24/2022  Plan of Care expiration date: 2022  Treatment plan discussed with: patient        Subjective Evaluation    History of Present Illness  Date of surgery: 10/18/2022  Mechanism of injury: surgery  Mechanism of injury: Patient had elective right TKR last week  He progressed to no AD, has been walking a lot  Only c/o pain anterior knee  22: Patient started anti-inflammatory meds last week and feeling much better  He is happy with his progress  Has follow up with MD Wednesday  Not a recurrent problem   Quality of life: excellent    Pain  Current pain ratin  At worst pain rating: 3  Location: R knee   Quality: dull ache and discomfort  Relieving factors: rest and ice  Aggravating factors: walking and stair climbing  Progression: improved    Social Support  Steps to enter house: no  Stairs in house: no   Lives in: [facility]  Lives with: spouse    Employment status: not working  Patient Goals  Patient goals for therapy: decreased pain, increased motion, increased strength and return to sport/leisure activities  Patient goal: able to kneel, snowboard, kayak        Objective     Observations     Right Knee   Positive for effusion and incision  Additional Observation Details  Incision closed and healing well  Some scar tissue noted distal vastus lateralis with tenderness  Neurological Testing     Sensation     Knee     Right Knee   Intact: light touch     Active Range of Motion   Left Knee   Flexion: 123 degrees   Extension: 0 degrees     Right Knee   Flexion: 115 degrees with pain  Extension: -4 degrees     Mobility   Patellar Mobility:     Right Knee   WFL: medial, lateral, superior and inferior    Strength/Myotome Testing     Left Knee   Normal strength    Right Knee   Flexion: 5  Extension: 5  Quadriceps contraction: good    Swelling     Left Knee Girth Measurement (cm)   10 cm above joint line: supra patella  Right Knee Girth Measurement (cm)   10 cm above joint line: supra patella      Ambulation   Weight-Bearing Status   Weight-Bearing Status (Right): weight-bearing as tolerated    Assistive device used: none    Ambulation: Level Surfaces   Ambulation without assistive device: independent    Ambulation: Stairs   Pattern: reciprocal  Railings: without rails  Pattern: reciprocal  Railings: without rails    Observational Gait   Gait: within functional limits     General Comments:    Lower quarter screen   Hips: unremarkable    Knee Comments    Swelling decreased superior right knee              Precautions: OA, B shld sx, B knee scope, Back pain, R TKR 12/2021      Manuals 10/24 10/27 11/2 11/4 11/8 11/11 11/14 11/16 11/21    R knee 10' 10' 10' 10' 10' 10' 10  8                 PROM     116° 118 115 118 115P                 Neuro Re-Ed             SLS R             Side stepping   NV GTB 3 laps 3x blue 3x blue 3x 3x blk 3x    Monster walks   NV GTB 3 laps Blue 3x Blue 3x 3x 3x blk 3x    pods     nv                                  Ther Ex             HEP             Bridge w/ball squeeze         30x    R heel slides 30x 30x 30x strap OP 30x strap 30x 30x strap 30x 30x     Hip adduction Band blue 30x 40# 30x 55# 30x 70#0 30x 70# 30x 70# 30x 70# 30x 70# 30x 70# 30x    Hip adduction Ball 30x 40# 30x 55# 30x 70# 30x 70# 30x 70# 30x 70# 30x 70# 30x 70# 30x    R SLR 2x15 3# 30x 3# 30x 3# 30x 4# 30x 4# 30x 3# 30x 3# 30x 4# 30x    R SAQ 30x 3# 30x 3# 30x 3# 30x 6# 30x 6# 30x 5# 30x 5#/30 6# x30x    R LAQ 30x 3# 30x 3# 30x 3# 30x 6# 30x 6# 30x 5# 30x 5#/30 6#  30x    Slant board nv L3 30"4x L3 30"x4 L3 30"x4 30" 4x 30"x4 4x 4x 4x    Nustep 8' L4 8' L 5 8' L5  8' L6        bike  5' 5' 10' 10 10' 10' 10' 10' L3    Leg press pad 6         90# 30x    TKE   BLK 20x BLK 30x Ball 30x Blk 30x 30x ball 30x ball 30x    Mini squat   20x 30x  30x       Knee flexion lunge stretch   2" x10          Prone quad sets        20x     Ther Activity             Step ups   8 in 20x 8inc 20x  8in 20x       Heel taps step downs   4in 20x 4 in 20x  4 in 20x 30x 4" 30x 4" 30" 6"    Lat step downs   8 in 20x 8in 20x 4" 30x   4" 20x     Gait Training Modalities

## 2022-11-25 ENCOUNTER — APPOINTMENT (OUTPATIENT)
Dept: PHYSICAL THERAPY | Age: 67
End: 2022-11-25

## 2022-12-15 ENCOUNTER — TELEPHONE (OUTPATIENT)
Dept: PHYSICAL THERAPY | Age: 67
End: 2022-12-15

## 2022-12-15 NOTE — TELEPHONE ENCOUNTER
Called patient regarding status  Patient had a f/u with his MD and is released from PT  Patient reports he is doing well, has returned to his gym program, and will continue with an independent HEP at this time  Reports no functional limitation at this time

## 2023-08-18 ENCOUNTER — HOSPITAL ENCOUNTER (OUTPATIENT)
Dept: ULTRASOUND IMAGING | Facility: HOSPITAL | Age: 68
End: 2023-08-18
Payer: MEDICARE

## 2023-08-18 ENCOUNTER — HOSPITAL ENCOUNTER (OUTPATIENT)
Dept: ULTRASOUND IMAGING | Facility: HOSPITAL | Age: 68
Discharge: HOME | End: 2023-08-18

## 2023-08-18 DIAGNOSIS — M76.62 ACHILLES TENDINITIS, LEFT LEG: ICD-10-CM

## 2023-08-18 PROCEDURE — 76882 US LMTD JT/FCL EVL NVASC XTR: CPT

## 2024-02-21 PROBLEM — S61.012A THUMB LACERATION, LEFT, INITIAL ENCOUNTER: Status: RESOLVED | Noted: 2020-09-18 | Resolved: 2024-02-21

## 2025-02-03 ENCOUNTER — APPOINTMENT (EMERGENCY)
Dept: CT IMAGING | Facility: HOSPITAL | Age: 70
End: 2025-02-03
Payer: MEDICARE

## 2025-02-03 ENCOUNTER — APPOINTMENT (EMERGENCY)
Dept: RADIOLOGY | Facility: HOSPITAL | Age: 70
End: 2025-02-03
Payer: MEDICARE

## 2025-02-03 ENCOUNTER — HOSPITAL ENCOUNTER (EMERGENCY)
Facility: HOSPITAL | Age: 70
Discharge: OTHER INSTITUTION | End: 2025-02-03
Attending: EMERGENCY MEDICINE | Admitting: EMERGENCY MEDICINE
Payer: MEDICARE

## 2025-02-03 ENCOUNTER — APPOINTMENT (INPATIENT)
Dept: RADIOLOGY | Facility: HOSPITAL | Age: 70
DRG: 964 | End: 2025-02-03
Payer: MEDICARE

## 2025-02-03 ENCOUNTER — HOSPITAL ENCOUNTER (INPATIENT)
Facility: HOSPITAL | Age: 70
LOS: 4 days | Discharge: HOME | DRG: 964 | End: 2025-02-07
Attending: SURGERY | Admitting: SURGERY
Payer: MEDICARE

## 2025-02-03 VITALS
TEMPERATURE: 97.8 F | SYSTOLIC BLOOD PRESSURE: 150 MMHG | RESPIRATION RATE: 24 BRPM | DIASTOLIC BLOOD PRESSURE: 80 MMHG | HEART RATE: 71 BPM | OXYGEN SATURATION: 97 %

## 2025-02-03 DIAGNOSIS — S06.4XAA EPIDURAL HEMATOMA (HCC): Primary | ICD-10-CM

## 2025-02-03 DIAGNOSIS — I77.74 VERTEBRAL ARTERY DISSECTION (HCC): ICD-10-CM

## 2025-02-03 DIAGNOSIS — S12.000A CLOSED DISPLACED FRACTURE OF FIRST CERVICAL VERTEBRA, UNSPECIFIED FRACTURE MORPHOLOGY, INITIAL ENCOUNTER (HCC): Primary | ICD-10-CM

## 2025-02-03 DIAGNOSIS — S06.4XAA EPIDURAL HEMATOMA (HCC): ICD-10-CM

## 2025-02-03 DIAGNOSIS — S12.001A CLOSED NONDISPLACED FRACTURE OF FIRST CERVICAL VERTEBRA, UNSPECIFIED FRACTURE MORPHOLOGY, INITIAL ENCOUNTER (HCC): ICD-10-CM

## 2025-02-03 PROBLEM — S12.9XXA CLOSED FRACTURE OF CERVICAL VERTEBRA (HCC): Status: ACTIVE | Noted: 2025-02-03

## 2025-02-03 LAB
ABO GROUP BLD: NORMAL
ABO GROUP BLD: NORMAL
ALBUMIN SERPL BCG-MCNC: 4.7 G/DL (ref 3.5–5)
ALP SERPL-CCNC: 55 U/L (ref 34–104)
ALT SERPL W P-5'-P-CCNC: 13 U/L (ref 7–52)
ANION GAP SERPL CALCULATED.3IONS-SCNC: 9 MMOL/L (ref 4–13)
APTT PPP: 29 SECONDS (ref 23–34)
AST SERPL W P-5'-P-CCNC: 41 U/L (ref 13–39)
BASOPHILS # BLD AUTO: 0.03 THOUSANDS/ΜL (ref 0–0.1)
BASOPHILS NFR BLD AUTO: 0 % (ref 0–1)
BILIRUB SERPL-MCNC: 0.83 MG/DL (ref 0.2–1)
BLD GP AB SCN SERPL QL: NEGATIVE
BUN SERPL-MCNC: 18 MG/DL (ref 5–25)
CALCIUM SERPL-MCNC: 9.3 MG/DL (ref 8.4–10.2)
CHLORIDE SERPL-SCNC: 103 MMOL/L (ref 96–108)
CO2 SERPL-SCNC: 26 MMOL/L (ref 21–32)
CREAT SERPL-MCNC: 1.28 MG/DL (ref 0.6–1.3)
EOSINOPHIL # BLD AUTO: 0.05 THOUSAND/ΜL (ref 0–0.61)
EOSINOPHIL NFR BLD AUTO: 1 % (ref 0–6)
ERYTHROCYTE [DISTWIDTH] IN BLOOD BY AUTOMATED COUNT: 12.6 % (ref 11.6–15.1)
GFR SERPL CREATININE-BSD FRML MDRD: 56 ML/MIN/1.73SQ M
GLUCOSE SERPL-MCNC: 101 MG/DL (ref 65–140)
HCT VFR BLD AUTO: 45.1 % (ref 36.5–49.3)
HGB BLD-MCNC: 15 G/DL (ref 12–17)
IMM GRANULOCYTES # BLD AUTO: 0.02 THOUSAND/UL (ref 0–0.2)
IMM GRANULOCYTES NFR BLD AUTO: 0 % (ref 0–2)
INR PPP: 1.01 (ref 0.85–1.19)
LYMPHOCYTES # BLD AUTO: 0.61 THOUSANDS/ΜL (ref 0.6–4.47)
LYMPHOCYTES NFR BLD AUTO: 7 % (ref 14–44)
MCH RBC QN AUTO: 29.2 PG (ref 26.8–34.3)
MCHC RBC AUTO-ENTMCNC: 33.3 G/DL (ref 31.4–37.4)
MCV RBC AUTO: 88 FL (ref 82–98)
MONOCYTES # BLD AUTO: 0.34 THOUSAND/ΜL (ref 0.17–1.22)
MONOCYTES NFR BLD AUTO: 4 % (ref 4–12)
NEUTROPHILS # BLD AUTO: 7.9 THOUSANDS/ΜL (ref 1.85–7.62)
NEUTS SEG NFR BLD AUTO: 88 % (ref 43–75)
NRBC BLD AUTO-RTO: 0 /100 WBCS
PLATELET # BLD AUTO: 167 THOUSANDS/UL (ref 149–390)
PMV BLD AUTO: 10.4 FL (ref 8.9–12.7)
POTASSIUM SERPL-SCNC: 3.9 MMOL/L (ref 3.5–5.3)
PROT SERPL-MCNC: 6.6 G/DL (ref 6.4–8.4)
PROTHROMBIN TIME: 13.8 SECONDS (ref 12.3–15)
RBC # BLD AUTO: 5.14 MILLION/UL (ref 3.88–5.62)
RH BLD: POSITIVE
RH BLD: POSITIVE
SODIUM SERPL-SCNC: 138 MMOL/L (ref 135–147)
SPECIMEN EXPIRATION DATE: NORMAL
WBC # BLD AUTO: 8.95 THOUSAND/UL (ref 4.31–10.16)

## 2025-02-03 PROCEDURE — 85730 THROMBOPLASTIN TIME PARTIAL: CPT | Performed by: EMERGENCY MEDICINE

## 2025-02-03 PROCEDURE — 86900 BLOOD TYPING SEROLOGIC ABO: CPT | Performed by: EMERGENCY MEDICINE

## 2025-02-03 PROCEDURE — 74177 CT ABD & PELVIS W/CONTRAST: CPT

## 2025-02-03 PROCEDURE — 70551 MRI BRAIN STEM W/O DYE: CPT

## 2025-02-03 PROCEDURE — 80053 COMPREHEN METABOLIC PANEL: CPT | Performed by: EMERGENCY MEDICINE

## 2025-02-03 PROCEDURE — 36415 COLL VENOUS BLD VENIPUNCTURE: CPT | Performed by: EMERGENCY MEDICINE

## 2025-02-03 PROCEDURE — 85025 COMPLETE CBC W/AUTO DIFF WBC: CPT | Performed by: EMERGENCY MEDICINE

## 2025-02-03 PROCEDURE — 99284 EMERGENCY DEPT VISIT MOD MDM: CPT

## 2025-02-03 PROCEDURE — 72125 CT NECK SPINE W/O DYE: CPT

## 2025-02-03 PROCEDURE — 71260 CT THORAX DX C+: CPT

## 2025-02-03 PROCEDURE — NC001 PR NO CHARGE: Performed by: NEUROLOGICAL SURGERY

## 2025-02-03 PROCEDURE — 70450 CT HEAD/BRAIN W/O DYE: CPT

## 2025-02-03 PROCEDURE — 96376 TX/PRO/DX INJ SAME DRUG ADON: CPT

## 2025-02-03 PROCEDURE — 99291 CRITICAL CARE FIRST HOUR: CPT | Performed by: EMERGENCY MEDICINE

## 2025-02-03 PROCEDURE — 99291 CRITICAL CARE FIRST HOUR: CPT | Performed by: STUDENT IN AN ORGANIZED HEALTH CARE EDUCATION/TRAINING PROGRAM

## 2025-02-03 PROCEDURE — 86901 BLOOD TYPING SEROLOGIC RH(D): CPT | Performed by: EMERGENCY MEDICINE

## 2025-02-03 PROCEDURE — 99223 1ST HOSP IP/OBS HIGH 75: CPT | Performed by: SURGERY

## 2025-02-03 PROCEDURE — 85610 PROTHROMBIN TIME: CPT | Performed by: EMERGENCY MEDICINE

## 2025-02-03 PROCEDURE — 70496 CT ANGIOGRAPHY HEAD: CPT

## 2025-02-03 PROCEDURE — 96374 THER/PROPH/DIAG INJ IV PUSH: CPT

## 2025-02-03 PROCEDURE — 86850 RBC ANTIBODY SCREEN: CPT | Performed by: EMERGENCY MEDICINE

## 2025-02-03 PROCEDURE — 72141 MRI NECK SPINE W/O DYE: CPT

## 2025-02-03 PROCEDURE — 70498 CT ANGIOGRAPHY NECK: CPT

## 2025-02-03 PROCEDURE — 71045 X-RAY EXAM CHEST 1 VIEW: CPT

## 2025-02-03 PROCEDURE — 99223 1ST HOSP IP/OBS HIGH 75: CPT | Performed by: NEUROLOGICAL SURGERY

## 2025-02-03 RX ORDER — LIDOCAINE 50 MG/G
1 PATCH TOPICAL DAILY
Status: DISCONTINUED | OUTPATIENT
Start: 2025-02-03 | End: 2025-02-05

## 2025-02-03 RX ORDER — AMOXICILLIN 250 MG
2 CAPSULE ORAL 2 TIMES DAILY
Status: DISCONTINUED | OUTPATIENT
Start: 2025-02-03 | End: 2025-02-07 | Stop reason: HOSPADM

## 2025-02-03 RX ORDER — OXYCODONE HYDROCHLORIDE 5 MG/1
5 TABLET ORAL EVERY 4 HOURS PRN
Refills: 0 | Status: DISCONTINUED | OUTPATIENT
Start: 2025-02-03 | End: 2025-02-04

## 2025-02-03 RX ORDER — FENTANYL CITRATE 50 UG/ML
50 INJECTION, SOLUTION INTRAMUSCULAR; INTRAVENOUS ONCE
Refills: 0 | Status: COMPLETED | OUTPATIENT
Start: 2025-02-03 | End: 2025-02-03

## 2025-02-03 RX ORDER — HYDROMORPHONE HCL/PF 1 MG/ML
0.5 SYRINGE (ML) INJECTION EVERY 2 HOUR PRN
Refills: 0 | Status: DISCONTINUED | OUTPATIENT
Start: 2025-02-03 | End: 2025-02-07 | Stop reason: HOSPADM

## 2025-02-03 RX ORDER — ONDANSETRON 2 MG/ML
4 INJECTION INTRAMUSCULAR; INTRAVENOUS ONCE AS NEEDED
Status: DISCONTINUED | OUTPATIENT
Start: 2025-02-03 | End: 2025-02-03 | Stop reason: HOSPADM

## 2025-02-03 RX ORDER — BISACODYL 10 MG
10 SUPPOSITORY, RECTAL RECTAL DAILY PRN
Status: DISCONTINUED | OUTPATIENT
Start: 2025-02-03 | End: 2025-02-07 | Stop reason: HOSPADM

## 2025-02-03 RX ORDER — FENTANYL CITRATE 50 UG/ML
75 INJECTION, SOLUTION INTRAMUSCULAR; INTRAVENOUS ONCE
Refills: 0 | Status: COMPLETED | OUTPATIENT
Start: 2025-02-03 | End: 2025-02-03

## 2025-02-03 RX ORDER — OXYCODONE HYDROCHLORIDE 10 MG/1
10 TABLET ORAL EVERY 4 HOURS PRN
Refills: 0 | Status: DISCONTINUED | OUTPATIENT
Start: 2025-02-03 | End: 2025-02-04

## 2025-02-03 RX ORDER — KETOROLAC TROMETHAMINE 30 MG/ML
15 INJECTION, SOLUTION INTRAMUSCULAR; INTRAVENOUS ONCE
Status: DISCONTINUED | OUTPATIENT
Start: 2025-02-03 | End: 2025-02-03

## 2025-02-03 RX ORDER — SODIUM CHLORIDE, SODIUM LACTATE, POTASSIUM CHLORIDE, CALCIUM CHLORIDE 600; 310; 30; 20 MG/100ML; MG/100ML; MG/100ML; MG/100ML
125 INJECTION, SOLUTION INTRAVENOUS CONTINUOUS
Status: DISCONTINUED | OUTPATIENT
Start: 2025-02-03 | End: 2025-02-04

## 2025-02-03 RX ORDER — KETOROLAC TROMETHAMINE 30 MG/ML
1 INJECTION, SOLUTION INTRAMUSCULAR; INTRAVENOUS ONCE
Status: COMPLETED | OUTPATIENT
Start: 2025-02-03 | End: 2025-02-03

## 2025-02-03 RX ORDER — FENTANYL CITRATE 50 UG/ML
50 INJECTION, SOLUTION INTRAMUSCULAR; INTRAVENOUS ONCE AS NEEDED
Refills: 0 | Status: COMPLETED | OUTPATIENT
Start: 2025-02-03 | End: 2025-02-03

## 2025-02-03 RX ORDER — ACETAMINOPHEN 325 MG/1
975 TABLET ORAL EVERY 8 HOURS SCHEDULED
Status: DISCONTINUED | OUTPATIENT
Start: 2025-02-03 | End: 2025-02-07 | Stop reason: HOSPADM

## 2025-02-03 RX ORDER — GABAPENTIN 100 MG/1
100 CAPSULE ORAL
Status: DISCONTINUED | OUTPATIENT
Start: 2025-02-03 | End: 2025-02-04

## 2025-02-03 RX ORDER — PANTOPRAZOLE SODIUM 40 MG/1
40 TABLET, DELAYED RELEASE ORAL
Status: DISCONTINUED | OUTPATIENT
Start: 2025-02-04 | End: 2025-02-07 | Stop reason: HOSPADM

## 2025-02-03 RX ORDER — HYDROMORPHONE HCL/PF 1 MG/ML
0.5 SYRINGE (ML) INJECTION ONCE
Status: COMPLETED | OUTPATIENT
Start: 2025-02-03 | End: 2025-02-03

## 2025-02-03 RX ORDER — ONDANSETRON 2 MG/ML
4 INJECTION INTRAMUSCULAR; INTRAVENOUS EVERY 4 HOURS PRN
Status: DISCONTINUED | OUTPATIENT
Start: 2025-02-03 | End: 2025-02-07 | Stop reason: HOSPADM

## 2025-02-03 RX ORDER — CHLORHEXIDINE GLUCONATE ORAL RINSE 1.2 MG/ML
15 SOLUTION DENTAL EVERY 12 HOURS SCHEDULED
Status: DISCONTINUED | OUTPATIENT
Start: 2025-02-03 | End: 2025-02-04

## 2025-02-03 RX ADMIN — GABAPENTIN 100 MG: 100 CAPSULE ORAL at 21:16

## 2025-02-03 RX ADMIN — FENTANYL CITRATE 50 MCG: 50 INJECTION INTRAMUSCULAR; INTRAVENOUS at 10:57

## 2025-02-03 RX ADMIN — HYDROMORPHONE HYDROCHLORIDE 0.5 MG: 1 INJECTION, SOLUTION INTRAMUSCULAR; INTRAVENOUS; SUBCUTANEOUS at 13:09

## 2025-02-03 RX ADMIN — IOHEXOL 85 ML: 350 INJECTION, SOLUTION INTRAVENOUS at 12:17

## 2025-02-03 RX ADMIN — HYDROMORPHONE HYDROCHLORIDE 0.5 MG: 1 INJECTION, SOLUTION INTRAMUSCULAR; INTRAVENOUS; SUBCUTANEOUS at 14:09

## 2025-02-03 RX ADMIN — IOHEXOL 100 ML: 350 INJECTION, SOLUTION INTRAVENOUS at 11:07

## 2025-02-03 RX ADMIN — OXYCODONE HYDROCHLORIDE 5 MG: 5 TABLET ORAL at 18:15

## 2025-02-03 RX ADMIN — FENTANYL CITRATE 75 MCG: 50 INJECTION INTRAMUSCULAR; INTRAVENOUS at 12:14

## 2025-02-03 RX ADMIN — OXYCODONE HYDROCHLORIDE 10 MG: 10 TABLET ORAL at 13:57

## 2025-02-03 RX ADMIN — SODIUM CHLORIDE, SODIUM LACTATE, POTASSIUM CHLORIDE, AND CALCIUM CHLORIDE 125 ML/HR: .6; .31; .03; .02 INJECTION, SOLUTION INTRAVENOUS at 15:57

## 2025-02-03 RX ADMIN — ACETAMINOPHEN 975 MG: 325 TABLET, FILM COATED ORAL at 13:57

## 2025-02-03 RX ADMIN — LIDOCAINE 5% 1 PATCH: 700 PATCH TOPICAL at 15:57

## 2025-02-03 RX ADMIN — HYDROMORPHONE HYDROCHLORIDE 0.5 MG: 1 INJECTION, SOLUTION INTRAMUSCULAR; INTRAVENOUS; SUBCUTANEOUS at 16:47

## 2025-02-03 RX ADMIN — HYDROMORPHONE HYDROCHLORIDE 0.5 MG: 1 INJECTION, SOLUTION INTRAMUSCULAR; INTRAVENOUS; SUBCUTANEOUS at 19:32

## 2025-02-03 RX ADMIN — FENTANYL CITRATE 50 MCG: 50 INJECTION INTRAMUSCULAR; INTRAVENOUS at 11:36

## 2025-02-03 RX ADMIN — ONDANSETRON 4 MG: 2 INJECTION INTRAMUSCULAR; INTRAVENOUS at 22:19

## 2025-02-03 RX ADMIN — ACETAMINOPHEN 975 MG: 325 TABLET, FILM COATED ORAL at 21:16

## 2025-02-03 RX ADMIN — OXYCODONE HYDROCHLORIDE 10 MG: 10 TABLET ORAL at 22:19

## 2025-02-03 NOTE — TELEMEDICINE
Neurosurgery e-Consult ([facility])     Peter Delarosa 69 y.o. male MRN: [id number]  Unit/Bed#: ED 07 Encounter: [id number]    Contacted by provider Sukumar King   Reason for consult: MRI findings    Please see our full consultation note in the ED from earlier today for comprehensive H&P.    Available past medical history, social history, surgical history, medication list, drug allergies and review of systems were reviewed.    /77   Pulse 78   Temp 97.9 °F (36.6 °C) (Axillary)   Resp 18   Wt 82.1 kg (181 lb)   SpO2 98%   BMI 23.24 kg/m²      MRI brain showed no acute infarct, including right vertebral artery territory.    MRI cervical spine showed ventral epidural hematoma primarily at C3-5 in setting of chronic degenerative changes with most prominent stenosis at C5/6 without abnormal cord signal changes as well as diffuse ligamentous and soft tissue edema primarily at C5-7.    Assessment and Recommendations    Continue close neurologic monitoring, maintain c-spine precautions in rigid collar as previously recommended. Given intact and stable exam, I do not anticipate emergent neurosurgical intervention tonight.    All questions and concerns were addressed. Provider is in agreement with the course of action.     31+ minutes were spent in clinical decision making and direct communication, >50% of the total time devoted to medical consultative verbal/EMR discussion between providers.      Jj Soto M.D.  Neurosurgeon On Call

## 2025-02-03 NOTE — ASSESSMENT & PLAN NOTE
Acute comminuted C5 articular pillar  non displaced fracture of R C6 tp  non displaced fracture of L C6 vertebral body extending to the L transverse foramen.    Plan:   Vista collar to be worn at all time   Misty collar for showering   Monitor neurologically   Will require upright cervical XR when completed

## 2025-02-03 NOTE — ASSESSMENT & PLAN NOTE
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     -See above

## 2025-02-03 NOTE — ASSESSMENT & PLAN NOTE
Cervical spinal ventral epidural hematoma spans from at least C3-C7, worse at C5-C6 where there is circumferential involvement and suspected severe canal stenosis.     -See above
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
## 2025-02-03 NOTE — ED ATTENDING ATTESTATION
2/3/2025  I, Ezequiel Villeda DO, saw and evaluated the patient. I have discussed the patient with the resident/non-physician practitioner and agree with the resident's/non-physician practitioner's findings, Plan of Care, and MDM as documented in the resident's/non-physician practitioner's note, except where noted. All available labs and Radiology studies were reviewed.  I was present for key portions of any procedure(s) performed by the resident/non-physician practitioner and I was immediately available to provide assistance.       At this point I agree with the current assessment done in the Emergency Department.  I have conducted an independent evaluation of this patient a history and physical is as follows:    ED Course  ED Course as of 02/03/25 1239   Mon Feb 03, 2025   1058 I was present throughout patient's evaluation. Upon assessment there was concern of possible  spinal cord injury, symptoms concerning for central cord syndrome as well.  With CT head, C-spine, chest abdomen pelvis due to mechanism, distracting injury neck pain.  Patient collared prior to arrival   1102 Chest x-ray demonstrates old rib fractures on the left.  No pneumothorax.   1108 CT of the C-spine demonstrates possible acute fracture of C6; will wait for formal read   1142 Call radiologist: Cervical spine injury. C6 through both foramen   1207 No neurologic changes to the patient's condition, pain certainly increase, will remedicated.  Patient with concerning findings on imaging, will transfer priority 1 to trauma         Critical Care Time  CriticalCare Time    Date/Time: 2/3/2025 1:38 PM    Performed by: Ezequiel Villeda DO  Authorized by: Ezequiel Villeda DO    Critical care provider statement:     Critical care time (minutes):  58    Critical care time was exclusive of:  Separately billable procedures and treating other patients and teaching time    Critical care was necessary to treat or prevent imminent or  life-threatening deterioration of the following conditions:  Trauma    Critical care was time spent personally by me on the following activities:  Examination of patient, development of treatment plan with patient or surrogate, obtaining history from patient or surrogate, review of old charts, re-evaluation of patient's condition, ordering and review of radiographic studies, ordering and review of laboratory studies and ordering and performing treatments and interventions  Comments:      Epidural hematoma, cervical spine fracture, vertebral artery dissection. At risk for paralysis, weakness, loss of limb requiring emergent/priority trasnfer to level 1 trauma center

## 2025-02-03 NOTE — TRAUMA DOCUMENTATION
Report to life flight at bedside.  Attempted to call report to receiving facility but call not answered.  Will attempt again
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
## 2025-02-03 NOTE — EMTALA/ACUTE CARE TRANSFER
[facility] EMERGENCY DEPARTMENT  [address] [place] [address]  Dept: [phone]      EMTALA TRANSFER CONSENT    NAME Peter Delarosa                                         1955                              MRN [id number]    I have been informed of my rights regarding examination, treatment, and transfer   by Dr. Ezequiel Villeda DO    Benefits:      Risks:        Consent for Transfer:  I acknowledge that my medical condition has been evaluated and explained to me by the emergency department physician or other qualified medical person and/or my attending physician, who has recommended that I be transferred to the service of    at  . The above potential benefits of such transfer, the potential risks associated with such transfer, and the probable risks of not being transferred have been explained to me, and I fully understand them.  The doctor has explained that, in my case, the benefits of transfer outweigh the risks.  I agree to be transferred.    I authorize the performance of emergency medical procedures and treatments upon me in both transit and upon arrival at the receiving facility.  Additionally, I authorize the release of any and all medical records to the receiving facility and request they be transported with me, if possible.  I understand that the safest mode of transportation during a medical emergency is an ambulance and that the Hospital advocates the use of this mode of transport. Risks of traveling to the receiving facility by car, including absence of medical control, life sustaining equipment, such as oxygen, and medical personnel has been explained to me and I fully understand them.    (CARLOS CORRECT BOX BELOW)  [  ]  I consent to the stated transfer and to be transported by ambulance/helicopter.  [  ]  I consent to the stated transfer, but refuse transportation by ambulance and accept full responsibility for my transportation by car.  I understand the  risks of non-ambulance transfers and I exonerate the Hospital and its staff from any deterioration in my condition that results from this refusal.    X___________________________________________    DATE  25  TIME________  Signature of patient or legally responsible individual signing on patient behalf           RELATIONSHIP TO PATIENT_________________________          Provider Certification    NAME Peter Delarosa                                         1955                              MRN [id number]    A medical screening exam was performed on the above named patient.  Based on the examination:    Condition Necessitating Transfer The encounter diagnosis was Epidural hematoma (HCC).    Patient Condition:      Reason for Transfer:      Transfer Requirements: Facility     Space available and qualified personnel available for treatment as acknowledged by    Agreed to accept transfer and to provide appropriate medical treatment as acknowledged by          Appropriate medical records of the examination and treatment of the patient are provided at the time of transfer   STAFF INITIAL WHEN COMPLETED _______  Transfer will be performed by qualified personnel from    and appropriate transfer equipment as required, including the use of necessary and appropriate life support measures.    Provider Certification: I have examined the patient and explained the following risks and benefits of being transferred/refusing transfer to the patient/family:         Based on these reasonable risks and benefits to the patient and/or the unborn child(irena), and based upon the information available at the time of the patient’s examination, I certify that the medical benefits reasonably to be expected from the provision of appropriate medical treatments at another medical facility outweigh the increasing risks, if any, to the individual’s medical condition, and in the case of labor to the unborn child, from effecting the  transfer.    X____________________________________________ DATE 02/03/25        TIME_______      ORIGINAL - SEND TO MEDICAL RECORDS   COPY - SEND WITH PATIENT DURING TRANSFER

## 2025-02-03 NOTE — EMTALA/ACUTE CARE TRANSFER
[facility] EMERGENCY DEPARTMENT  [address] [place] [address]  Dept: [phone]      EMTALA TRANSFER CONSENT    NAME Peter Delarosa                                         1955                              MRN [id number]    I have been informed of my rights regarding examination, treatment, and transfer   by Dr. Ezequiel Villeda,     Benefits: Specialized equipment and/or services available at the receiving facility (Include comment)________________________ (Simultaneous filing. User may not have seen previous data.)    Risks: Potential for delay in receiving treatment, Potential deterioration of medical condition, Loss of IV, Possible worsening of condition or death during transfer, Increased discomfort during transfer (Simultaneous filing. User may not have seen previous data.)      Transfer Request   I acknowledge that my medical condition has been evaluated and explained to me by the emergency department physician or other qualified medical person and/or my attending physician who has recommended and offered to me further medical examination and treatment. I understand the Hospital's obligation with respect to the treatment and stabilization of my emergency medical condition. I nevertheless request to be transferred. I release the Hospital, the doctor, and any other persons caring for me from all responsibility or liability for any injury or ill effects that may result from my transfer and agree to accept all responsibility for the consequences of my choice to transfer, rather than receive stabilizing treatment at the Hospital. I understand that because the transfer is my request, my insurance may not provide reimbursement for the services.  The Hospital will assist and direct me and my family in how to make arrangements for transfer, but the hospital is not liable for any fees charged by the transport service.  In spite of this understanding, I refuse to consent  to further medical examination and treatment which has been offered to me, and request transfer to Accepting Facility Name, City & State : [facility] [place]. I authorize the performance of emergency medical procedures and treatments upon me in both transit and upon arrival at the receiving facility.  Additionally, I authorize the release of any and all medical records to the receiving facility and request they be transported with me, if possible.    I authorize the performance of emergency medical procedures and treatments upon me in both transit and upon arrival at the receiving facility.  Additionally, I authorize the release of any and all medical records to the receiving facility and request they be transported with me, if possible.  I understand that the safest mode of transportation during a medical emergency is an ambulance and that the Hospital advocates the use of this mode of transport. Risks of traveling to the receiving facility by car, including absence of medical control, life sustaining equipment, such as oxygen, and medical personnel has been explained to me and I fully understand them.    (CARLOS CORRECT BOX BELOW)  [  ]  I consent to the stated transfer and to be transported by ambulance/helicopter.  [  ]  I consent to the stated transfer, but refuse transportation by ambulance and accept full responsibility for my transportation by car.  I understand the risks of non-ambulance transfers and I exonerate the Hospital and its staff from any deterioration in my condition that results from this refusal.    X___________________________________________    DATE  25  TIME________  Signature of patient or legally responsible individual signing on patient behalf           RELATIONSHIP TO PATIENT_________________________          Provider Certification    NAME Peter Delarosa                                         1955                              MRN [id number]    A medical  screening exam was performed on the above named patient.  Based on the examination:    Condition Necessitating Transfer The encounter diagnosis was Epidural hematoma (HCC).    Patient Condition: The patient has been stabilized such that within reasonable medical probability, no material deterioration of the patient condition or the condition of the unborn child(irena) is likely to result from the transfer (Simultaneous filing. User may not have seen previous data.)    Reason for Transfer: Level of Care needed not available at this facility (Simultaneous filing. User may not have seen previous data.)    Transfer Requirements: Facility [facility]   Space available and qualified personnel available for treatment as acknowledged by    Agreed to accept transfer and to provide appropriate medical treatment as acknowledged by       Dr. Campbell (Simultaneous filing. User may not have seen previous data.)  Appropriate medical records of the examination and treatment of the patient are provided at the time of transfer   STAFF INITIAL WHEN COMPLETED _______  Transfer will be performed by qualified personnel from    and appropriate transfer equipment as required, including the use of necessary and appropriate life support measures.    Provider Certification: I have examined the patient and explained the following risks and benefits of being transferred/refusing transfer to the patient/family:  General risk, such as traffic hazards, adverse weather conditions, rough terrain or turbulence, possible failure of equipment (including vehicle or aircraft), or consequences of actions of persons outside the control of the transport personnel, Unanticipated needs of medical equipment and personnel during transport, Risk of worsening condition, The possibility of a transport vehicle being unavailable (Simultaneous filing. User may not have seen previous data.)      Based on these reasonable risks and benefits to the  patient and/or the unborn child(irena), and based upon the information available at the time of the patient’s examination, I certify that the medical benefits reasonably to be expected from the provision of appropriate medical treatments at another medical facility outweigh the increasing risks, if any, to the individual’s medical condition, and in the case of labor to the unborn child, from effecting the transfer.    X____________________________________________ DATE 02/03/25        TIME_______      ORIGINAL - SEND TO MEDICAL RECORDS   COPY - SEND WITH PATIENT DURING TRANSFER
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
## 2025-02-03 NOTE — RESTORATIVE TECHNICIAN NOTE
Restorative Technician Note      Patient Name: Peter Delarosa     Restorative Tech Visit Date: 02/03/25  Note Type: Bracing, Initial consult  Patient Position Upon Consult: Supine  Brace Applied: [place] Vista Collar Set (Chin plate lvl 2)  Patient Position When Brace Applied: Supine  Education Provided: Yes; Family or social support of family present for education by provider  Patient Position at End of Consult: Supine; All needs within reach  Nurse Communication: Nurse aware of consult, application of brace    Cervical handout, diann shower collar, and replacement pads place in pt belonging bag.    Please contact [facility] Restorative tech on Epic Secure Chat  in regards to bracing instruction and/or adjustment.    Suresh Holden, Restorative Technician

## 2025-02-03 NOTE — ED PROVIDER NOTES
Emergency Department Trauma Note  Peter Delarosa 69 y.o. male MRN: [id number]  Unit/Bed#: TR 03/TR 03 Encounter: [id number]      Trauma Alert: Trauma Acuity: Trauma Evaluation  Model of Arrival: Mode of Arrival: ALS via    Trauma Team: Current Providers  Attending Provider: Ezequiel Villeda DO  Registered Nurse: Meera Allison, RN  Registered Nurse: Aleks Cadet, RN  Physician Assistant: Rafaela Chavez PA-C  Consultants:     Other: {routine consult; Epic consult order placed;      History of Present Illness     Chief Complaint:   Chief Complaint   Patient presents with    Numbness     States of numbness and tingling of bilateral arms.  Was snowboarding and was turning and launched into air.  Landed board first and went through 2 fences.  C/o pain betweeen shoulder blades.     HPI:  Peter Delarosa is a 69 y.o. male who presents with pain between shoulders/neck pain after a snowboarding accident earlier today.  Mechanism:Details of Incident: Snowboarding and went airborn and landed on board.  Going 60 mph during incident. Injury Date: 02/03/25 Injury Time: 1006 Injury Occurence Location - Specify County: [facility]    Patient is a 69 y.o M with a PMH of prior cervical fracture presenting after a snowboarding accident earlier today. Patient states he was going about 60 mph when his board caught an edge. He was airborne about 8 feet and landed on his board crashing through two fences. Patient states he was unable to walk after the incident and is reporting numbness/tingling in his arms that is new. Patient also reporting pain between his shoulder blades. Patient states he did not lose consciousness and did not hit his head. Patient denies thinners.         Review of Systems   Eyes:  Negative for visual disturbance.   Respiratory:  Negative for shortness of breath.    Cardiovascular:  Negative for chest pain.   Gastrointestinal:  Negative for abdominal pain and vomiting.   Musculoskeletal:  Positive for back  pain, neck pain and neck stiffness.   Neurological:  Positive for numbness. Negative for dizziness, syncope, facial asymmetry, light-headedness and headaches.   All other systems reviewed and are negative.      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  Past Surgical History:   Procedure Laterality Date    HERNIA REPAIR  01/03/2005    KNEE ARTHROSCOPY Left 11/30/2005    KNEE ARTHROSCOPY Right 07/20/2005    SHOULDER ARTHROSCOPY Left     in the 1980's    TONSILLECTOMY       Social History     Tobacco Use    Smoking status: Never    Smokeless tobacco: Never   Vaping Use    Vaping status: Never Used   Substance Use Topics    Alcohol use: Not Currently     Comment: quit 2000     E-Cigarette/Vaping    E-Cigarette Use Never User      E-Cigarette/Vaping Substances       Family History: non-contributory    Meds/Allergies   Prior to Admission Medications   Prescriptions Last Dose Informant Patient Reported? Taking?   HYDROcodone-acetaminophen (NORCO) 5-325 mg per tablet   Yes No   Sig: Take 1 tablet by mouth every 6 (six) hours as needed for pain      Facility-Administered Medications Last Administration Doses Remaining   Sodium Hyaluronate 20 mg 7/31/2020 12:12 PM    Sodium Hyaluronate 20 mg 7/31/2020 12:12 PM           Allergies   Allergen Reactions    Tetracyclines & Related        PHYSICAL EXAM    PE limited by: nothing    Objective   Vitals:   First set: Temperature:  97.8 °F (36.6 °C) (02/03/25 1045)  Pulse: 67 (02/03/25 1045)  Respirations: (!) 23 (02/03/25 1045)  Blood Pressure: (!) 141/103 (02/03/25 1045)  SpO2: 98 % (02/03/25 1041)    Primary Survey:   (A) Airway: airway is patent  (B) Breathing: normal, breath sounds present BL  (C) Circulation: Pulses:   normal  (D) Disabliity:  GCS Total:  15  (E) Expose:  Completed    Secondary Survey: (Click on Physical Exam tab above)  Physical Exam  Vitals and nursing note reviewed.   Constitutional:       General: He is not in acute distress.     Appearance: Normal appearance. He is well-developed. He is not ill-appearing.   HENT:      Head: Normocephalic and atraumatic.      Nose: Nose normal.   Eyes:      Extraocular Movements: Extraocular movements intact.      Conjunctiva/sclera: Conjunctivae normal.      Pupils: Pupils are equal, round, and reactive to light.   Cardiovascular:      Rate and Rhythm: Normal rate and regular rhythm.      Heart sounds: Normal heart sounds. No murmur heard.  Pulmonary:      Effort: Pulmonary effort is normal. No respiratory distress.      Breath sounds: Normal breath sounds. No wheezing.   Abdominal:      Palpations: Abdomen is soft.      Tenderness: There is no abdominal tenderness. There is no guarding.   Musculoskeletal:         General: No swelling.      Cervical back: Neck supple. Tenderness present.   Skin:     General: Skin is warm and dry.      Capillary Refill: Capillary refill takes less than 2 seconds.   Neurological:      Mental Status: He is alert and oriented to person, place, and time.      GCS: GCS eye subscore is 4. GCS verbal subscore is 5. GCS motor subscore is 6.      Cranial Nerves: No dysarthria or facial asymmetry.      Sensory: Sensory deficit present.      Comments: Numbness and tingling in arms   Psychiatric:         Mood and Affect: Mood normal.         Behavior: Behavior normal.         Thought Content: Thought content normal.         Judgment: Judgment normal.          Cervical spine cleared by clinical criteria? No (imaging required)      Invasive Devices       Peripheral Intravenous Line  Duration             Peripheral IV Left;Ventral (anterior) Wrist -- days                    Lab Results:   Results Reviewed       Procedure Component Value Units Date/Time    Comprehensive metabolic panel [[id number]]  (Abnormal) Collected: 02/03/25 1146    Lab Status: Final result Specimen: Blood from Arm, Left Updated: 02/03/25 1212     Sodium 138 mmol/L      Potassium 3.9 mmol/L      Chloride 103 mmol/L      CO2 26 mmol/L      ANION GAP 9 mmol/L      BUN 18 mg/dL      Creatinine 1.28 mg/dL      Glucose 101 mg/dL      Calcium 9.3 mg/dL      AST 41 U/L      ALT 13 U/L      Alkaline Phosphatase 55 U/L      Total Protein 6.6 g/dL      Albumin 4.7 g/dL      Total Bilirubin 0.83 mg/dL      eGFR 56 ml/min/1.73sq m     Narrative:      National Kidney Disease Foundation guidelines for Chronic Kidney Disease (CKD):     Stage 1 with normal or high GFR (GFR > 90 mL/min/1.73 square meters)    Stage 2 Mild CKD (GFR = 60-89 mL/min/1.73 square meters)    Stage 3A Moderate CKD (GFR = 45-59 mL/min/1.73 square meters)    Stage 3B Moderate CKD (GFR = 30-44 mL/min/1.73 square meters)    Stage 4 Severe CKD (GFR = 15-29 mL/min/1.73 square meters)    Stage 5 End Stage CKD (GFR <15 mL/min/1.73 square meters)  Note: GFR calculation is accurate only with a steady state creatinine    Protime-INR [[id number]]  (Normal) Collected: 02/03/25 1146    Lab Status: Final result Specimen: Blood from Arm, Left Updated: 02/03/25 1210     Protime 13.8 seconds      INR 1.01    Narrative:      INR Therapeutic Range    Indication                                             INR Range      Atrial Fibrillation                                               2.0-3.0  Hypercoagulable State                                    2.0.2.3  Left Ventricular Asist Device                            2.0-3.0  Mechanical Heart Valve                                   -    Aortic(with afib, MI, embolism, HF, LA enlargement,    and/or coagulopathy)                                     2.0-3.0 (2.5-3.5)     Mitral                                                             2.5-3.5  Prosthetic/Bioprosthetic Heart Valve               2.0-3.0  Venous thromboembolism (VTE: VT, PE        2.0-3.0    APTT [[id number]]  (Normal) Collected: 02/03/25 1146    Lab Status: Final result Specimen: Blood from Arm, Left Updated: 02/03/25 1210     PTT 29 seconds     CBC and differential [[id number]]  (Abnormal) Collected: 02/03/25 1118    Lab Status: Final result Specimen: Blood from Arm, Left Updated: 02/03/25 1127     WBC 8.95 Thousand/uL      RBC 5.14 Million/uL      Hemoglobin 15.0 g/dL      Hematocrit 45.1 %      MCV 88 fL      MCH 29.2 pg      MCHC 33.3 g/dL      RDW 12.6 %      MPV 10.4 fL      Platelets 167 Thousands/uL      nRBC 0 /100 WBCs      Segmented % 88 %      Immature Grans % 0 %      Lymphocytes % 7 %      Monocytes % 4 %      Eosinophils Relative 1 %      Basophils Relative 0 %      Absolute Neutrophils 7.90 Thousands/µL      Absolute Immature Grans 0.02 Thousand/uL      Absolute Lymphocytes 0.61 Thousands/µL      Absolute Monocytes 0.34 Thousand/µL      Eosinophils Absolute 0.05 Thousand/µL      Basophils Absolute 0.03 Thousands/µL                    Imaging Studies:   Direct to CT: Yes  CTA head and neck with and without contrast   Final Result by Morro Miller MD (02/03 1250)      CT Brain:   -  Age-indeterminate small hypodense infarct in right frontoparietal lobe, unchanged. This could be subacute or chronic in age. Consider MRI brain without contrast for further evaluation.      CT Angiography:   - Occluded right vertebral artery V2 segment (proximally and distally) and V3 segment with multifocal areas of stenotic reconstituted flow, likely due to traumatic dissection injury.      Multiple cervical spine fractures as detailed below:   -Acute  comminuted fracture of right C5 articular pillar.   -Acute displaced fracture of right C6 transverse process.   -Acute nondisplaced fracture of left C6 vertebral body with extension into left C6 transverse foramen.      Cervical spinal epidural hematoma spans from at least C3-C7, worse at C5-C6 where there is circumferential involvement and suspected severe canal stenosis. Recommend evaluation MRI cervical spine without contrast for further evaluation. Recommend    evaluation by neurosurgery.      Additional chronic/incidental findings as detailed above.      I personally epic secure messaged regarding traumatic dissection injury of right vertebral artery with SANTIAGO SEXTON on 2/3/2025 12:41 PM, who read and responded immediately. The study was also marked in EPIC for immediate notification.                        Workstation performed: [id number]         TRAUMA - CT head wo contrast   Final Result by Seamus Romero MD (02/03 7063)      No acute intracranial hemorrhage. Focal hypodensity in the right parietal lobe is favored to reflect a chronic infarct but may be evaluated to MRI if there is clinical concern for acute infarction.            I personally discussed this study with SANTIAGO SEXTON on 2/3/2025 11:44 AM.               Resident: YEIMI TREVINO I, the attending radiologist, have reviewed the images and agree with the final report above.      Workstation performed: [id number]         TRAUMA - CT spine cervical wo contrast   Final Result by Seamus Romero MD (02/03 4586)      1.  Acute displaced fractures involving the bilateral C5 transverse processes with displacement into the transverse foramen on the left. Recommend further evaluation with CTA of the neck to assess for vascular injury.   2.  Mildly displaced fracture of the right C6 articular facet with extension to both superior and inferior surfaces.   3.  Ventral epidural hematoma from C3-C6/7 with narrowing of the spinal canal. Cannot  exclude cord compression. Recommend neurosurgery consultation and further evaluation with MRI.               I personally discussed this study with SANTIAGO SEXTON on 2/3/2025 11:56 AM.            Resident: YEIMI TREVINO I, the attending radiologist, have reviewed the images and agree with the final report above.      Workstation performed: [id number]         TRAUMA - CT chest abdomen pelvis w contrast   Final Result by Seamus Romero MD (02/03 1208)      No findings of acute traumatic injury in the chest, abdomen or pelvis.               I personally discussed this study with SANTIAGO SEXTON on 2/3/2025 12:06 PM.            Resident: YEMII TREVINO I, the attending radiologist, have reviewed the images and agree with the final report above.      Workstation performed: [id number]         XR Trauma chest portable   Final Result by Seamus Romero MD (02/03 1208)      No acute cardiopulmonary disease.            Resident: YEIMI TREVINO I, the attending radiologist, have reviewed the images and agree with the final report above.      Workstation performed: [id number]               Procedures  CriticalCare Time    Date/Time: 2/3/2025 2:49 PM    Performed by: Rafaela Chavez PA-C  Authorized by: Rafaela Chavez PA-C    Critical care provider statement:     Critical care time (minutes):  45    Critical care start time:  2/3/2025 10:40 AM    Critical care end time:  2/3/2025 11:25 AM    Critical care time was exclusive of:  Separately billable procedures and treating other patients    Critical care was necessary to treat or prevent imminent or life-threatening deterioration of the following conditions:  Trauma    Critical care was time spent personally by me on the following activities:  Ordering and performing treatments and interventions, ordering and review of laboratory studies, ordering and review of radiographic studies, re-evaluation of patient's condition, review of old charts, examination of patient,  "evaluation of patient's response to treatment, discussions with consultants, development of treatment plan with patient or surrogate and obtaining history from patient or surrogate           ED Course           Medical Decision Making  Patient is a 69 y.o M with a PMH of prior cervical fracture presenting after a snowboarding accident earlier today. Patient is hypertensive and tachypneic on arrival; remainder of vitals WNL. Physical exam revealing tenderness to palpation of c spine. No bruising noted, sensation/motor function intact. Remainder of physical exam is benign.    Will order ct head/neck, ct chest/abdomen/pelvis, chest x-ray, and left shoulder x-ray. Will order basic labs, type and screen.     CT cervical neck showing fracture of C5/C6 and epidural hematoma over c3-7. Will order CTA head/neck to evaluate for vascular injury. CT head showing \"Age-indeterminate small hypodense infarct in right frontoparietal lobe, unchanged. This could be subacute or chronic in age. Consider MRI brain without contrast for further evaluation.\" CT chest abdomen pelvis showing no acute traumatic injuries. Discussed case with trauma provider Dr. Campbell who recommended patient be transferred to [place] for further evaluation/management. Discussed recommendations with patient and wife who are agreeable with plan. Patient transferred via life flight; stable at discharge.     Amount and/or Complexity of Data Reviewed  Labs: ordered.  Radiology: ordered.    Risk  Prescription drug management.                Disposition  [facility] Transfer: Yes  Final diagnoses:   Epidural hematoma (HCC)     Time reflects when diagnosis was documented in both MDM as applicable and the Disposition within this note       Time User Action Codes Description Comment    2/3/2025 12:00 PM Ezequiel Villeda Add [S06.4XAA] Epidural hematoma (HCC)           ED Disposition       ED Disposition   Transfer to Another Facility-In Network    Condition   -- "    Date/Time   Mon Feb 3, 2025 11:46 AM    Comment   Peter Delarosa should be transferred out to [place].               MD Documentation      Flowsheet Row Most Recent Value   Patient Condition The patient has been stabilized such that within reasonable medical probability, no material deterioration of the patient condition or the condition of the unborn child(irena) is likely to result from the transfer  [Simultaneous filing. User may not have seen previous data.]   Reason for Transfer Level of Care needed not available at this facility  [Simultaneous filing. User may not have seen previous data.]   Benefits of Transfer Specialized equipment and/or services available at the receiving facility (Include comment)________________________  [Simultaneous filing. User may not have seen previous data.]   Risks of Transfer Potential for delay in receiving treatment, Potential deterioration of medical condition, Loss of IV, Possible worsening of condition or death during transfer, Increased discomfort during transfer  [Simultaneous filing. User may not have seen previous data.]   Accepting Physician Dr. Campbell  [Simultaneous filing. User may not have seen previous data.]   Accepting Facility Name, [facility]   Sending MD Rafaela Chavez PA-C  [Simultaneous filing. User may not have seen previous data.]   Provider Certification General risk, such as traffic hazards, adverse weather conditions, rough terrain or turbulence, possible failure of equipment (including vehicle or aircraft), or consequences of actions of persons outside the control of the transport personnel, Unanticipated needs of medical equipment and personnel during transport, Risk of worsening condition, The possibility of a transport vehicle being unavailable  [Simultaneous filing. User may not have seen previous data.]          RN Documentation      Flowsheet Row Most Recent Value   Accepting [facility], [facility]    keAnum Mcmillan          Follow-up Information    None       Discharge Medication List as of 2/3/2025 12:43 PM        CONTINUE these medications which have NOT CHANGED    Details   HYDROcodone-acetaminophen (NORCO) 5-325 mg per tablet Take 1 tablet by mouth every 6 (six) hours as needed for pain, Historical Med           No discharge procedures on file.    PDMP Review       None            ED Provider  Electronically Signed by           Rafaela Chavez PA-C  02/03/25 5348       Rafaela Chavez PA-C  02/03/25 9419

## 2025-02-03 NOTE — ASSESSMENT & PLAN NOTE
Acute comminuted fracture of right C5 articular pillar.  Acute displaced fracture of right C6 transverse process, and nondisplaced fracture of left vertebral body with extension into left transverse foramen.     - Patient evaluated by neurosurgery, who is actively following  - Plan for a stat CT head and C-spine MRI  - Admit to ICU  - Every neurochecks  - As needed pain and nausea control  - C-collar precautions  - Hold AC/AP at this time  - N.p.o.

## 2025-02-03 NOTE — ED NOTES
Attempted to call report, but RN busy with other patient care. Will call back in about 10 minutes     Ana Cristina Onofre RN  02/03/25 1285

## 2025-02-03 NOTE — ED NOTES
Patient transported to MRI with STAT RN and cardiac monitor     Ana Cristina Onofre RN  02/03/25 5543       Ana Cristina Onofre RN  02/03/25 6055

## 2025-02-04 ENCOUNTER — APPOINTMENT (INPATIENT)
Dept: RADIOLOGY | Facility: HOSPITAL | Age: 70
DRG: 964 | End: 2025-02-04
Payer: MEDICARE

## 2025-02-04 PROBLEM — I61.5 IVH (INTRAVENTRICULAR HEMORRHAGE) (HCC): Status: ACTIVE | Noted: 2025-02-04

## 2025-02-04 LAB
ANION GAP SERPL CALCULATED.3IONS-SCNC: 6 MMOL/L (ref 4–13)
BASOPHILS # BLD AUTO: 0.02 THOUSANDS/ΜL (ref 0–0.1)
BASOPHILS NFR BLD AUTO: 0 % (ref 0–1)
BUN SERPL-MCNC: 18 MG/DL (ref 5–25)
CALCIUM SERPL-MCNC: 8.9 MG/DL (ref 8.4–10.2)
CHLORIDE SERPL-SCNC: 104 MMOL/L (ref 96–108)
CO2 SERPL-SCNC: 28 MMOL/L (ref 21–32)
CREAT SERPL-MCNC: 1.22 MG/DL (ref 0.6–1.3)
EOSINOPHIL # BLD AUTO: 0.13 THOUSAND/ΜL (ref 0–0.61)
EOSINOPHIL NFR BLD AUTO: 2 % (ref 0–6)
ERYTHROCYTE [DISTWIDTH] IN BLOOD BY AUTOMATED COUNT: 12.8 % (ref 11.6–15.1)
GFR SERPL CREATININE-BSD FRML MDRD: 59 ML/MIN/1.73SQ M
GLUCOSE SERPL-MCNC: 90 MG/DL (ref 65–140)
HCT VFR BLD AUTO: 42.2 % (ref 36.5–49.3)
HGB BLD-MCNC: 14.1 G/DL (ref 12–17)
IMM GRANULOCYTES # BLD AUTO: 0.01 THOUSAND/UL (ref 0–0.2)
IMM GRANULOCYTES NFR BLD AUTO: 0 % (ref 0–2)
LYMPHOCYTES # BLD AUTO: 0.61 THOUSANDS/ΜL (ref 0.6–4.47)
LYMPHOCYTES NFR BLD AUTO: 9 % (ref 14–44)
MAGNESIUM SERPL-MCNC: 2 MG/DL (ref 1.9–2.7)
MCH RBC QN AUTO: 29.3 PG (ref 26.8–34.3)
MCHC RBC AUTO-ENTMCNC: 33.4 G/DL (ref 31.4–37.4)
MCV RBC AUTO: 88 FL (ref 82–98)
MONOCYTES # BLD AUTO: 0.67 THOUSAND/ΜL (ref 0.17–1.22)
MONOCYTES NFR BLD AUTO: 10 % (ref 4–12)
NEUTROPHILS # BLD AUTO: 5.06 THOUSANDS/ΜL (ref 1.85–7.62)
NEUTS SEG NFR BLD AUTO: 79 % (ref 43–75)
NRBC BLD AUTO-RTO: 0 /100 WBCS
PHOSPHATE SERPL-MCNC: 3.1 MG/DL (ref 2.3–4.1)
PLATELET # BLD AUTO: 147 THOUSANDS/UL (ref 149–390)
PMV BLD AUTO: 10.6 FL (ref 8.9–12.7)
POTASSIUM SERPL-SCNC: 3.8 MMOL/L (ref 3.5–5.3)
RBC # BLD AUTO: 4.81 MILLION/UL (ref 3.88–5.62)
SODIUM SERPL-SCNC: 138 MMOL/L (ref 135–147)
WBC # BLD AUTO: 6.5 THOUSAND/UL (ref 4.31–10.16)

## 2025-02-04 PROCEDURE — 99232 SBSQ HOSP IP/OBS MODERATE 35: CPT | Performed by: STUDENT IN AN ORGANIZED HEALTH CARE EDUCATION/TRAINING PROGRAM

## 2025-02-04 PROCEDURE — 99233 SBSQ HOSP IP/OBS HIGH 50: CPT | Performed by: NURSE PRACTITIONER

## 2025-02-04 PROCEDURE — NC001 PR NO CHARGE: Performed by: STUDENT IN AN ORGANIZED HEALTH CARE EDUCATION/TRAINING PROGRAM

## 2025-02-04 PROCEDURE — 97163 PT EVAL HIGH COMPLEX 45 MIN: CPT

## 2025-02-04 PROCEDURE — 99223 1ST HOSP IP/OBS HIGH 75: CPT | Performed by: ANESTHESIOLOGY

## 2025-02-04 PROCEDURE — 83735 ASSAY OF MAGNESIUM: CPT | Performed by: STUDENT IN AN ORGANIZED HEALTH CARE EDUCATION/TRAINING PROGRAM

## 2025-02-04 PROCEDURE — 85025 COMPLETE CBC W/AUTO DIFF WBC: CPT | Performed by: STUDENT IN AN ORGANIZED HEALTH CARE EDUCATION/TRAINING PROGRAM

## 2025-02-04 PROCEDURE — 80048 BASIC METABOLIC PNL TOTAL CA: CPT | Performed by: STUDENT IN AN ORGANIZED HEALTH CARE EDUCATION/TRAINING PROGRAM

## 2025-02-04 PROCEDURE — 72040 X-RAY EXAM NECK SPINE 2-3 VW: CPT

## 2025-02-04 PROCEDURE — 92610 EVALUATE SWALLOWING FUNCTION: CPT

## 2025-02-04 PROCEDURE — 73610 X-RAY EXAM OF ANKLE: CPT

## 2025-02-04 PROCEDURE — 97167 OT EVAL HIGH COMPLEX 60 MIN: CPT

## 2025-02-04 PROCEDURE — 84100 ASSAY OF PHOSPHORUS: CPT | Performed by: STUDENT IN AN ORGANIZED HEALTH CARE EDUCATION/TRAINING PROGRAM

## 2025-02-04 PROCEDURE — 70450 CT HEAD/BRAIN W/O DYE: CPT

## 2025-02-04 RX ORDER — HYDROMORPHONE HCL/PF 1 MG/ML
0.5 SYRINGE (ML) INJECTION ONCE
Status: COMPLETED | OUTPATIENT
Start: 2025-02-04 | End: 2025-02-04

## 2025-02-04 RX ORDER — LEVETIRACETAM 500 MG/5ML
500 INJECTION, SOLUTION, CONCENTRATE INTRAVENOUS EVERY 12 HOURS SCHEDULED
Status: DISCONTINUED | OUTPATIENT
Start: 2025-02-04 | End: 2025-02-04

## 2025-02-04 RX ORDER — METHOCARBAMOL 500 MG/1
500 TABLET, FILM COATED ORAL EVERY 6 HOURS SCHEDULED
Status: DISCONTINUED | OUTPATIENT
Start: 2025-02-04 | End: 2025-02-04

## 2025-02-04 RX ORDER — OXYCODONE HYDROCHLORIDE 10 MG/1
10 TABLET ORAL EVERY 4 HOURS PRN
Refills: 0 | Status: DISCONTINUED | OUTPATIENT
Start: 2025-02-04 | End: 2025-02-05

## 2025-02-04 RX ORDER — DIAZEPAM 5 MG/1
5 TABLET ORAL EVERY 6 HOURS PRN
Status: DISCONTINUED | OUTPATIENT
Start: 2025-02-04 | End: 2025-02-07 | Stop reason: HOSPADM

## 2025-02-04 RX ORDER — DIAZEPAM 10 MG/2ML
5 INJECTION, SOLUTION INTRAMUSCULAR; INTRAVENOUS ONCE
Status: COMPLETED | OUTPATIENT
Start: 2025-02-04 | End: 2025-02-04

## 2025-02-04 RX ORDER — LORAZEPAM 2 MG/ML
1 INJECTION INTRAMUSCULAR
Status: DISCONTINUED | OUTPATIENT
Start: 2025-02-04 | End: 2025-02-05

## 2025-02-04 RX ORDER — GLYCOPYRROLATE 0.2 MG/ML
0.2 INJECTION INTRAMUSCULAR; INTRAVENOUS EVERY 4 HOURS PRN
Status: DISCONTINUED | OUTPATIENT
Start: 2025-02-04 | End: 2025-02-05

## 2025-02-04 RX ORDER — GABAPENTIN 100 MG/1
100 CAPSULE ORAL 3 TIMES DAILY
Status: DISCONTINUED | OUTPATIENT
Start: 2025-02-04 | End: 2025-02-07 | Stop reason: HOSPADM

## 2025-02-04 RX ORDER — HALOPERIDOL 5 MG/ML
2 INJECTION INTRAMUSCULAR
Status: DISCONTINUED | OUTPATIENT
Start: 2025-02-04 | End: 2025-02-05

## 2025-02-04 RX ORDER — DULOXETIN HYDROCHLORIDE 30 MG/1
30 CAPSULE, DELAYED RELEASE ORAL DAILY
Status: DISCONTINUED | OUTPATIENT
Start: 2025-02-04 | End: 2025-02-07 | Stop reason: HOSPADM

## 2025-02-04 RX ORDER — METHOCARBAMOL 750 MG/1
750 TABLET, FILM COATED ORAL EVERY 6 HOURS SCHEDULED
Status: DISCONTINUED | OUTPATIENT
Start: 2025-02-04 | End: 2025-02-07 | Stop reason: HOSPADM

## 2025-02-04 RX ORDER — POTASSIUM CHLORIDE 1500 MG/1
20 TABLET, EXTENDED RELEASE ORAL ONCE
Status: COMPLETED | OUTPATIENT
Start: 2025-02-04 | End: 2025-02-04

## 2025-02-04 RX ADMIN — GABAPENTIN 100 MG: 100 CAPSULE ORAL at 15:34

## 2025-02-04 RX ADMIN — OXYCODONE HYDROCHLORIDE 15 MG: 10 TABLET ORAL at 16:35

## 2025-02-04 RX ADMIN — DULOXETINE 30 MG: 30 CAPSULE, DELAYED RELEASE ORAL at 10:25

## 2025-02-04 RX ADMIN — KETAMINE HYDROCHLORIDE 0.2 MG/KG/HR: 100 INJECTION INTRAMUSCULAR; INTRAVENOUS at 04:34

## 2025-02-04 RX ADMIN — SENNOSIDES AND DOCUSATE SODIUM 2 TABLET: 50; 8.6 TABLET ORAL at 10:06

## 2025-02-04 RX ADMIN — ACETAMINOPHEN 975 MG: 325 TABLET, FILM COATED ORAL at 14:00

## 2025-02-04 RX ADMIN — GABAPENTIN 100 MG: 100 CAPSULE ORAL at 21:55

## 2025-02-04 RX ADMIN — SODIUM CHLORIDE, SODIUM LACTATE, POTASSIUM CHLORIDE, AND CALCIUM CHLORIDE 125 ML/HR: .6; .31; .03; .02 INJECTION, SOLUTION INTRAVENOUS at 00:50

## 2025-02-04 RX ADMIN — OXYCODONE HYDROCHLORIDE 10 MG: 10 TABLET ORAL at 06:27

## 2025-02-04 RX ADMIN — METHOCARBAMOL 750 MG: 750 TABLET ORAL at 03:38

## 2025-02-04 RX ADMIN — HYDROMORPHONE HYDROCHLORIDE 0.5 MG: 1 INJECTION, SOLUTION INTRAMUSCULAR; INTRAVENOUS; SUBCUTANEOUS at 06:27

## 2025-02-04 RX ADMIN — HYDROMORPHONE HYDROCHLORIDE 0.5 MG: 1 INJECTION, SOLUTION INTRAMUSCULAR; INTRAVENOUS; SUBCUTANEOUS at 18:34

## 2025-02-04 RX ADMIN — POTASSIUM CHLORIDE 20 MEQ: 1500 TABLET, EXTENDED RELEASE ORAL at 06:36

## 2025-02-04 RX ADMIN — HYDROMORPHONE HYDROCHLORIDE 0.5 MG: 1 INJECTION, SOLUTION INTRAMUSCULAR; INTRAVENOUS; SUBCUTANEOUS at 03:53

## 2025-02-04 RX ADMIN — PANTOPRAZOLE SODIUM 40 MG: 40 TABLET, DELAYED RELEASE ORAL at 06:27

## 2025-02-04 RX ADMIN — HYDROMORPHONE HYDROCHLORIDE 0.5 MG: 1 INJECTION, SOLUTION INTRAMUSCULAR; INTRAVENOUS; SUBCUTANEOUS at 02:24

## 2025-02-04 RX ADMIN — HYDROMORPHONE HYDROCHLORIDE 0.5 MG: 1 INJECTION, SOLUTION INTRAMUSCULAR; INTRAVENOUS; SUBCUTANEOUS at 21:55

## 2025-02-04 RX ADMIN — LIDOCAINE 5% 1 PATCH: 700 PATCH TOPICAL at 10:06

## 2025-02-04 RX ADMIN — ACETAMINOPHEN 975 MG: 325 TABLET, FILM COATED ORAL at 21:55

## 2025-02-04 RX ADMIN — DIAZEPAM 5 MG: 10 INJECTION, SOLUTION INTRAMUSCULAR; INTRAVENOUS at 06:37

## 2025-02-04 RX ADMIN — LEVETIRACETAM 500 MG: 100 INJECTION, SOLUTION INTRAVENOUS at 02:47

## 2025-02-04 RX ADMIN — HYDROMORPHONE HYDROCHLORIDE 0.5 MG: 1 INJECTION, SOLUTION INTRAMUSCULAR; INTRAVENOUS; SUBCUTANEOUS at 00:17

## 2025-02-04 RX ADMIN — ACETAMINOPHEN 975 MG: 325 TABLET, FILM COATED ORAL at 06:27

## 2025-02-04 RX ADMIN — METHOCARBAMOL 750 MG: 750 TABLET ORAL at 18:33

## 2025-02-04 RX ADMIN — ONDANSETRON 4 MG: 2 INJECTION INTRAMUSCULAR; INTRAVENOUS at 10:28

## 2025-02-04 RX ADMIN — SENNOSIDES AND DOCUSATE SODIUM 2 TABLET: 50; 8.6 TABLET ORAL at 18:33

## 2025-02-04 RX ADMIN — OXYCODONE HYDROCHLORIDE 15 MG: 10 TABLET ORAL at 20:40

## 2025-02-04 RX ADMIN — CHLORHEXIDINE GLUCONATE 15 ML: 1.2 SOLUTION ORAL at 10:06

## 2025-02-04 RX ADMIN — OXYCODONE HYDROCHLORIDE 15 MG: 10 TABLET ORAL at 10:27

## 2025-02-04 RX ADMIN — OXYCODONE HYDROCHLORIDE 10 MG: 10 TABLET ORAL at 02:06

## 2025-02-04 RX ADMIN — METHOCARBAMOL 750 MG: 750 TABLET ORAL at 12:54

## 2025-02-04 RX ADMIN — DIAZEPAM 5 MG: 5 TABLET ORAL at 15:34

## 2025-02-04 NOTE — SPEECH THERAPY NOTE
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 He is in a C-collar from his accident and had difficulty swallowing potatoes, but feels this is because he had not had any food/drink for several hours prior. He was seen today with water and cookies which he tolerated with no s/s aspiration and report of no pain or difficulty swallowing. Okay to resume regular diet, please re consult ST with any concerns.      Risk/s for Aspiration: mild    Recommended Diet: regular diet and thin liquids   Recommended Form of Meds: whole with liquid   Aspiration precautions and swallowing strategies: upright posture, slow rate of feeding, small bites/sips, and alternating bites and sips  Other Recommendations: Continue frequent oral care      Current Medical Status per H&P 2/3/25  Peter Delarosa is a 69 y.o. male with history of multiple cervical spine injuries, and prior fractures, who presents as a priority 1 transfer after a snowboard accident.  Patient states he was traveling > 60 mph when he  went over a bump/ramp, causing him to go almost a feet in the air, and landed on a fence while still strapped into his snowboard.  He denies LOC.  He initially stood up after the incident to remove himself from the fence, but quickly sat back down when he felt that his neck was broken.  He initially had bilateral upper extremity weakness and numbness on the left compared to the right.  This resolved and he is currently back at his baseline.  Patient states that given his history of prior cervical spine injury, he does feel similar neurologic symptoms from time to time.  On exam he is GCS 15, motor or sensory intact, without deficits, only complaining of cervical spine pain.       Special Studies:  MRI C spine 2/3/25 IMPRESSION:  1. Long segment epidural hematoma as described above resulting in severe central stenosis at C3-4, C4-5, and C5-C6 and moderate to severe stenosis at C7-T1. There is multifocal mass effect on the cord which is moderate to severe at C5-C6. Cord signal   appears normal.  2. Multifocal ligamentous injury including ALL and likely PLL injury at C5-C6, disruption of the disc at C5-C6, ligamentum flavum discontinuity at C6-C7, facet capsular injury at C5-C6 on the right, and diffuse interspinous ligament edema.  3. Widened right facet at C5-C6 with right C5 facet fracture as seen on CT. The fractures involving the transverse processes at C6 are not identified on MR.  4. Loss of the right vertebral artery flow-void consistent with the occlusion seen on CTA.  CT chest 2/3/25 IMPRESSION:  No findings of acute traumatic injury in the chest, abdomen or pelvis.    Social/Education/Vocational Hx:  Pt lives with his wife    Swallow Information   Current Risks for Dysphagia & Aspiration: C collar s/p snowboarding accident  Current Diet: NPO   Baseline Diet: regular diet and thin liquids      Baseline Assessment   Behavior/Cognition: alert  Speech/Language Status: able to participate in conversation and able  to follow commands  Patient Positioning: upright in bed  Pain Status/Interventions/Response to Interventions: No report of or nonverbal indications of pain.       Swallow Mechanism Exam  Facial: symmetrical  Labial: WFL  Lingual: WFL  Velum: symmetrical  Mandible: adequate ROM  Dentition: adequate  Vocal quality:clear/adequate   Volitional Cough: strong/productive   Respiratory Status: on RA      Consistencies Assessed and Performance   Consistencies Administered: thin liquids and hard solids    Oral Stage: WFL  Mastication was adequate with the materials administered today despite C collar in place. Bolus formation and transfer were functional with no significant oral residue noted. No overt s/s reduced oral control.    Pharyngeal Stage: WFL  Swallow Mechanics: Swallowing initiation appeared prompt. Laryngeal rise was palpated and judged to be within functional limits. No coughing, throat clearing, change in vocal quality or respiratory status noted today.     Esophageal Concerns: none reported      Summary and Recommendations (see above)    Results Reviewed with: patient, RN, and MD     Treatment Recommended: No additional ST f/u needed at this time. Please re consult with any new changes or concerns.

## 2025-02-04 NOTE — CASE MANAGEMENT
Case Management Discharge Planning Note    Patient name Peter Delarosa  Location ICU 05/ICU 05 MRN [id number]  : 1955 Date 2025       Current Admission Date: 2/3/2025  Current Admission Diagnosis:Closed fracture of cervical vertebra (HCC)   Patient Active Problem List    Diagnosis Date Noted Date Diagnosed    IVH (intraventricular hemorrhage) (HCC) 2025     Closed fracture of cervical vertebra (HCC) 2025     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HCC) 2025     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HCC) 2025     Epicondylitis elbow, medial, left 2020     Trochanteric bursitis of right hip 2020     Primary osteoarthritis of both knees 2020     Primary osteoarthritis of both shoulders 2020       LOS (days): 1  Geometric Mean LOS (GMLOS) (days):   Days to GMLOS:     OBJECTIVE:  Risk of Unplanned Readmission Score: 10.47         Current admission status: Inpatient   Preferred Pharmacy:   [id number] - [place] - [address] [facility]  [address] [place] [address] [place] [address]  Phone: [phone] Fax: [phone]    [id number] [place] [facility] [address] [facility]  [address] [facility] [address]  Phone: [phone] Fax: [phone]    Primary Care Provider: Michael Leung MD    Primary Insurance: MEDICARE  Secondary Insurance: AETNA    DISCHARGE DETAILS:    Pt evaluated by OT/PT and recommended for OP therapy  CM will follow
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
## 2025-02-04 NOTE — PLAN OF CARE
Problem: PHYSICAL THERAPY ADULT  Goal: Performs mobility at highest level of function for planned discharge setting.  See evaluation for individualized goals.  Description: Treatment/Interventions: OT, Spoke to case management, Spoke to nursing, Gait training, Bed mobility, Patient/family training, Endurance training, LE strengthening/ROM, Functional transfer training          See flowsheet documentation for full assessment, interventions and recommendations.  Note: Prognosis: Fair  Problem List: Decreased strength, Decreased range of motion, Decreased endurance, Impaired balance, Decreased mobility, Decreased coordination, Decreased cognition, Impaired judgement, Decreased safety awareness, Pain  Assessment: Pt is 69 y.o. male seen for PT evaluation s/p admit to [facility] on 2/3/2025 w/ Closed fracture of cervical vertebra (HCC). PT consulted to assess pt's functional mobility and d/c needs. Order placed for PT eval and tx, w/ up w/ A order. Comorbidities affecting pt's physical performance at time of assessment include:  has a past medical history of Arthritis, Closed fracture of cervical vertebra (HCC), Lumbar herniated disc, 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HCC), and 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HCC). PTA, pt was  living in one story house with spouse and daughter . Reports being very independent, enjoys kayaking, snowboarding, and jeancarlos diving.  Personal factors affecting pt at time of IE include: inaccessible home environment, inability to ambulate household distances, decreased initiation and engagement, unable to perform physical activity, inability to perform IADLs, and inability to perform ADLs. Please find objective findings from PT assessment regarding body systems outlined above with impairments and limitations including weakness, impaired balance, decreased endurance, impaired coordination, gait deviations, pain, decreased activity tolerance, decreased functional mobility  tolerance, decreased safety awareness, impaired judgement, fall risk, and decreased cognition. The following objective measures performed on IE also reveal limitations: The patient's AM-PAC Basic Mobility Inpatient Short Form Raw Score is 14, Standardized Score is 35.55. A standardized score less than 42.9 suggests the patient may benefit from discharge to post-acute rehabilitation services. Please also refer to the recommendation of the Physical Therapist for safe discharge planning. Pt's clinical presentation is currently unstable/unpredictable seen in pt's presentation of critical care monitoring. Pt to benefit from continued PT tx to address deficits as defined above and maximize level of functional independent mobility and consistency. From PT/mobility standpoint, recommendation at time of d/c would be level III pending progress in order to facilitate return to PLOF.  Barriers to Discharge: Inaccessible home environment, Decreased caregiver support     Rehab Resource Intensity Level, PT: III (Minimum Resource Intensity)    See flowsheet documentation for full assessment.

## 2025-02-04 NOTE — ASSESSMENT & PLAN NOTE
s/p snowboarding accident resulting in multiple cervical injuries      - continue ketamine gtt at 0.2mg/kg/hr    - increase oxycodone to 10mg/15mg PO q4h PRN moderate/severe pain    - continue dilaudid 0.5mg IV q2h PRN breakthrough pain    - patient likely requires higher opioid dosing given chronic home opioid/benzo use      - increase gabapentin to 100mg TID    - continue robaxin 750mg PO q6h isauro    - continue lidocaine 5% patch q12hr on/off    - continue acetaminophen 975mg PO q8h isauro    - start duloxetine 30mg PO daily    - start valium 5mg PO q6h PRN muscle spasms/anxiety

## 2025-02-04 NOTE — PLAN OF CARE
Problem: OCCUPATIONAL THERAPY ADULT  Goal: Performs self-care activities at highest level of function for planned discharge setting.  See evaluation for individualized goals.  Description: Treatment Interventions: ADL retraining, Functional transfer training, Endurance training, Patient/family training, Equipment evaluation/education, Compensatory technique education, Activityengagement          See flowsheet documentation for full assessment, interventions and recommendations.   Note: Limitation: Decreased ADL status, Decreased endurance, Decreased self-care trans, Decreased high-level ADLs  Prognosis: Good  Assessment: Pt is a 69 y.o. male who was admitted to [facility] on 2/3/2025 with Closed fracture of cervical vertebra (HCC), vertebral artery dissection and IVH s/p snowboarding accident . Patient  has a past medical history of Arthritis, Closed fracture of cervical vertebra (HCC), Lumbar herniated disc, 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HCC), and 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HCC).  At baseline pt was completing adls and mobility independently- I iadls -shares homemaking with spouse. Pt lives with wife in 1 story home with no FERNANDO. Currently pt requires min assist for overall ADLS and min assist for functional mobility/transfers. Pt currently presents with impairments in the following categories -difficulty performing ADLS and difficulty performing IADLS  activity tolerance, endurance, and standing balance/tolerance. These impairments, as well as pt's fatigue, pain, spinal precautions, and risk for falls  limit pt's ability to safely engage in all baseline areas of occupation, includingbathing, dressing, toileting, functional mobility/transfers, community mobility, laundry , driving, house maintenance, medication management, meal prep, cleaning, social participation , and leisure activities  From OT standpoint, recommend Level III resources upon D/C. OT will continue to follow to address  the below stated goals.     Rehab Resource Intensity Level, OT: III (Minimum Resource Intensity)

## 2025-02-04 NOTE — PLAN OF CARE
Problem: PAIN - ADULT  Goal: Verbalizes/displays adequate comfort level or baseline comfort level  Description: Interventions:  - Encourage patient to monitor pain and request assistance  - Assess pain using appropriate pain scale  - Administer analgesics based on type and severity of pain and evaluate response  - Implement non-pharmacological measures as appropriate and evaluate response  - Consider cultural and social influences on pain and pain management  - Notify physician/advanced practitioner if interventions unsuccessful or patient reports new pain  Outcome: Progressing     Problem: INFECTION - ADULT  Goal: Absence or prevention of progression during hospitalization  Description: INTERVENTIONS:  - Assess and monitor for signs and symptoms of infection  - Monitor lab/diagnostic results  - Monitor all insertion sites, i.e. indwelling lines, tubes, and drains  - Monitor endotracheal if appropriate and nasal secretions for changes in amount and color  - [place] appropriate cooling/warming therapies per order  - Administer medications as ordered  - Instruct and encourage patient and family to use good hand hygiene technique  - Identify and instruct in appropriate isolation precautions for identified infection/condition  Outcome: Progressing  Goal: Absence of fever/infection during neutropenic period  Description: INTERVENTIONS:  - Monitor WBC    Outcome: Progressing     Problem: SAFETY ADULT  Goal: Patient will remain free of falls  Description: INTERVENTIONS:  - Educate patient/family on patient safety including physical limitations  - Instruct patient to call for assistance with activity   - Consult OT/PT to assist with strengthening/mobility   - Keep Call bell within reach  - Keep bed low and locked with side rails adjusted as appropriate  - Keep care items and personal belongings within reach  - Initiate and maintain comfort rounds  - Make Fall Risk Sign visible to staff  - Offer Toileting every  Hours,  in advance of need  - Initiate/Maintain alarm  - Obtain necessary fall risk management equipment:   - Apply yellow socks and bracelet for high fall risk patients  - Consider moving patient to room near nurses station  Outcome: Progressing  Goal: Maintain or return to baseline ADL function  Description: INTERVENTIONS:  -  Assess patient's ability to carry out ADLs; assess patient's baseline for ADL function and identify physical deficits which impact ability to perform ADLs (bathing, care of mouth/teeth, toileting, grooming, dressing, etc.)  - Assess/evaluate cause of self-care deficits   - Assess range of motion  - Assess patient's mobility; develop plan if impaired  - Assess patient's need for assistive devices and provide as appropriate  - Encourage maximum independence but intervene and supervise when necessary  - Involve family in performance of ADLs  - Assess for home care needs following discharge   - Consider OT consult to assist with ADL evaluation and planning for discharge  - Provide patient education as appropriate  Outcome: Progressing  Goal: Maintains/Returns to pre admission functional level  Description: INTERVENTIONS:  - Perform AM-PAC 6 Click Basic Mobility/ Daily Activity assessment daily.  - Set and communicate daily mobility goal to care team and patient/family/caregiver.   - Collaborate with rehabilitation services on mobility goals if consulted  - Perform Range of Motion  times a day.  - Reposition patient every  hours.  - Dangle patient  times a day  - Stand patient  times a day  - Ambulate patient  times a day  - Out of bed to chair  times a day   - Out of bed for meals  times a day  - Out of bed for toileting  - Record patient progress and toleration of activity level   Outcome: Progressing     Problem: DISCHARGE PLANNING  Goal: Discharge to home or other facility with appropriate resources  Description: INTERVENTIONS:  - Identify barriers to discharge w/patient and caregiver  - Arrange for  needed discharge resources and transportation as appropriate  - Identify discharge learning needs (meds, wound care, etc.)  - Arrange for interpretive services to assist at discharge as needed  - Refer to Case Management Department for coordinating discharge planning if the patient needs post-hospital services based on physician/advanced practitioner order or complex needs related to functional status, cognitive ability, or social support system  Outcome: Progressing     Problem: Knowledge Deficit  Goal: Patient/family/caregiver demonstrates understanding of disease process, treatment plan, medications, and discharge instructions  Description: Complete learning assessment and assess knowledge base.  Interventions:  - Provide teaching at level of understanding  - Provide teaching via preferred learning methods  Outcome: Progressing

## 2025-02-04 NOTE — QUICK NOTE
Notified by ICU nursing team that patient is requesting discontinuation of the ketamine gtt.  Order discontinued, supportive orders (glyco, ativan, ect...) continue for another 24hr post-discontinuation of the infusion.

## 2025-02-04 NOTE — ASSESSMENT & PLAN NOTE
Trace traumatic IVH  S/p snowboard accident    Imaging:   MRI brain w/wo, 2/3/25: Trace intraventricular hemorrhage in posterior aspect of both lateral ventricles. Absent flow-related signal in right vertebral artery V4 segment, likely due to reversal of flow given traumatic dissection injury of right vertebral artery more proximally seen on earlier same day CTA head and neck with and without contrast. Chronic small infarct in right lateral frontal lobe with mild chronic microangiopathy.    Plan:  Continue to monitor neuro exam closely.  Repeat CT head for stability pending.  Likely will not require intervention.
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
## 2025-02-04 NOTE — PHYSICAL THERAPY NOTE
Physical Therapy Evaluation     Patient's Name: Peter Delarosa    Admitting Diagnosis  Trauma [T14.90XA]  Epidural hematoma ([facility]) [S06.4XAA]  Closed nondisplaced fracture of first cervical vertebra, unspecified fracture morphology, initial encounter ([facility]) [S12.001A]    Problem List  Patient Active Problem List   Diagnosis    Primary osteoarthritis of both knees    Primary osteoarthritis of both shoulders    Epicondylitis elbow, medial, left    Trochanteric bursitis of right hip    Closed fracture of cervical vertebra ([facility])    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[facility])    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[facility])    IVH (intraventricular hemorrhage) ([facility])       Past Medical History  Past Medical History:   Diagnosis Date    Arthritis     Closed fracture of cervical vertebra ([facility]) 02/03/2025    Lumbar herniated disc     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[facility]) 02/03/2025    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[facility]) 02/03/2025       Past Surgical History  Past Surgical History:   Procedure Laterality Date    HERNIA REPAIR  01/03/2005    KNEE ARTHROSCOPY Left 11/30/2005    KNEE ARTHROSCOPY Right 07/20/2005    SHOULDER ARTHROSCOPY Left     in the 1980's    TONSILLECTOMY          02/04/25 1021   PT Last Visit   PT Visit Date 02/04/25   Note Type   Note type Evaluation   Pain Assessment   Pain Assessment Tool 0-10   Pain Score 6   Pain Location/Orientation Location: Back   Hospital Pain Intervention(s) Repositioned;Ambulation/increased activity   Restrictions/Precautions   Weight Bearing Precautions Per Order No   Braces or Orthoses C/S Collar   Other Precautions Chair Alarm;Bed Alarm;Multiple lines;Telemetry;O2;Fall Risk;Pain;Cognitive   Home Living   Type of Home House   Home Layout One level;Stairs to enter with rails   Home Equipment Walker;Cane;Crutches   Prior Function   Level of [place] Independent with functional mobility   Lives With Spouse   Receives Help From Family   Falls in the last 6 months 0  "  Vocational Retired   Cognition   Orientation Level Oriented X4   Subjective   Subjective Pt willing and agreeable to PT session. \"I would like ot get out of this bed\"   RLE Assessment   RLE Assessment WFL   LLE Assessment   LLE Assessment WFL   Coordination   Movements are Fluid and Coordinated 0   Coordination and Movement Description slow and guarded compared to baseline mobility   Bed Mobility   Supine to Sit 5  Supervision   Additional items Assist x 1   Sit to Supine Unable to assess   Additional Comments Pt left resting in chair, call bell in reach, chair alarm active   Transfers   Sit to Stand 4  Minimal assistance   Additional items Assist x 1   Stand to Sit 4  Minimal assistance   Additional items Assist x 1   Ambulation/Elevation   Gait pattern Excessively slow;Inconsistent [place];Shuffling;Decreased foot clearance   Gait Assistance 4  Minimal assist   Additional items Assist x 1   Assistive Device Rolling walker   Distance 40+40+40   Balance   Static Sitting Fair +   Dynamic Sitting Fair   Static Standing Fair   Dynamic Standing Fair -   Ambulatory Poor +   Endurance Deficit   Endurance Deficit Yes   Endurance Deficit Description limited by fatigue, weakness, pain, dizziness, nausea, and balance compared to baseline   Activity Tolerance   Activity Tolerance Patient limited by fatigue;Patient limited by pain   Medical Staff Made Aware OT for D/C planning   Nurse Made Aware yes, nsg gave clearance to work with pt   Assessment   Prognosis Fair   Problem List Decreased strength;Decreased range of motion;Decreased endurance;Impaired balance;Decreased mobility;Decreased coordination;Decreased cognition;Impaired judgement;Decreased safety awareness;Pain   Assessment Pt is 69 y.o. male seen for PT evaluation s/p admit to [facility] on 2/3/2025 w/ Closed fracture of cervical vertebra (HCC). PT consulted to assess pt's functional mobility and d/c needs. Order placed for PT eval and tx, w/ up w/ A " order. Comorbidities affecting pt's physical performance at time of assessment include:  has a past medical history of Arthritis, Closed fracture of cervical vertebra (HCC), Lumbar herniated disc, 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HCC), and 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HCC). PTA, pt was  living in one story house with spouse and daughter . Reports being very independent, enjoys kayaking, snowboarding, and jeancarlos diving.  Personal factors affecting pt at time of IE include: inaccessible home environment, inability to ambulate household distances, decreased initiation and engagement, unable to perform physical activity, inability to perform IADLs, and inability to perform ADLs. Please find objective findings from PT assessment regarding body systems outlined above with impairments and limitations including weakness, impaired balance, decreased endurance, impaired coordination, gait deviations, pain, decreased activity tolerance, decreased functional mobility tolerance, decreased safety awareness, impaired judgement, fall risk, and decreased cognition. The following objective measures performed on IE also reveal limitations: The patient's AM-PAC Basic Mobility Inpatient Short Form Raw Score is 14, Standardized Score is 35.55. A standardized score less than 42.9 suggests the patient may benefit from discharge to post-acute rehabilitation services. Please also refer to the recommendation of the Physical Therapist for safe discharge planning. Pt's clinical presentation is currently unstable/unpredictable seen in pt's presentation of critical care monitoring. Pt to benefit from continued PT tx to address deficits as defined above and maximize level of functional independent mobility and consistency. From PT/mobility standpoint, recommendation at time of d/c would be level III pending progress in order to facilitate return to PLOF.   Barriers to Discharge Inaccessible home environment;Decreased caregiver support    Goals   Patient Goals To go home   STG Expiration Date 02/16/25   Short Term Goal #1 1. Complete bed mobility and transfers I to decrease need for caregiver in home. 2. Ambulate 300' I to complete household and community mobility without A. 3. Improve dynamic balance to good to decrease need for UE support during ambulation. 4. Be educated & demonstate 2 steps to be able to enter home without A.   Plan   Treatment/Interventions OT;Spoke to case management;Spoke to nursing;Gait training;Bed mobility;Patient/family training;Endurance training;LE strengthening/ROM;Functional transfer training   PT Frequency 3-5x/wk   Discharge Recommendation   Rehab Resource Intensity Level, PT III (Minimum Resource Intensity)   AM-PAC Basic Mobility Inpatient   Turning in Flat Bed Without Bedrails 3   Lying on Back to Sitting on Edge of Flat Bed Without Bedrails 3   Moving Bed to Chair 2   Standing Up From Chair Using Arms 2   Walk in Room 2   Climb 3-5 Stairs With Railing 2   Basic Mobility Inpatient Raw Score 14   Basic Mobility Standardized Score 35.55   [facility] Highest Level Of Mobility   [facility] Goal 4: Move to chair/commode       Annette Stoddard, PT

## 2025-02-04 NOTE — NURSING NOTE
150cc Ketamine drip wasted in Rx destroyer after continuous infusion discontinued with Luis Carlos Yates RN

## 2025-02-04 NOTE — OCCUPATIONAL THERAPY NOTE
Occupational Therapy Evaluation     Patient Name: Peter Delarosa  Today's Date: 2/4/2025  Problem List  Principal Problem:    Closed fracture of cervical vertebra (HCC)  Active Problems:    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HCC)    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HCC)    IVH (intraventricular hemorrhage) (HCC)    Past Medical History  Past Medical History:   Diagnosis Date    Arthritis     Closed fracture of cervical vertebra (HCC) 02/03/2025    Lumbar herniated disc     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HCC) 02/03/2025    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HCC) 02/03/2025     Past Surgical History  Past Surgical History:   Procedure Laterality Date    HERNIA REPAIR  01/03/2005    KNEE ARTHROSCOPY Left 11/30/2005    KNEE ARTHROSCOPY Right 07/20/2005    SHOULDER ARTHROSCOPY Left     in the 1980's    TONSILLECTOMY           02/04/25 1020   OT Last Visit   OT Visit Date 02/04/25   Note Type   Note type Evaluation   Pain Assessment   Pain Assessment Tool 0-10   Pain Score 6   Pain Location/Orientation Location: Back   Hospital Pain Intervention(s) Repositioned;Ambulation/increased activity;Emotional support;Relaxation technique   Restrictions/Precautions   Weight Bearing Precautions Per Order No   Braces or Orthoses (S)  C/S Collar   Other Precautions Chair Alarm;Bed Alarm;Multiple lines;Fall Risk;Pain   Home Living   Type of Home House   Home Layout One level   Prior Function   Level of [place] Independent with ADLs;Independent with functional mobility;Independent with IADLS   Lives With Spouse   Receives Help From Family   IADLs Independent with driving;Independent with meal prep;Independent with medication management   Falls in the last 6 months 0   Vocational Retired   Lifestyle   Autonomy I adls and mobility- i iadls - shares homemaking with family   Reciprocal Relationships supportive family - reports wife is also retired and able to assist PRN   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  "  Intrinsic Gratification active pta - enjoys outdoor activities (snowboarding, kayaking, jeancarlos diving)   Subjective   Subjective \"I thought I cleared the mogul but I caught an edge and it threw me into the air\"   ADL   Eating Assistance 7  Independent   Grooming Assistance 5  Supervision/Setup   UB Bathing Assistance 5  Supervision/Setup   LB Bathing Assistance 4  Minimal Assistance   UB Dressing Assistance 5  Supervision/Setup   LB Dressing Assistance 4  Minimal Assistance   Toileting Assistance  5  Supervision/Setup   Bed Mobility   Supine to Sit 5  Supervision   Transfers   Sit to Stand 4  Minimal assistance   Stand to Sit 4  Minimal assistance   Functional Mobility   Functional Mobility 4  Minimal assistance   Additional items Rolling walker   Balance   Static Sitting Fair +   Dynamic Sitting Fair   Static Standing Fair   Dynamic Standing Fair -   Ambulatory Poor +   Activity Tolerance   Activity Tolerance Patient limited by fatigue;Patient limited by pain;Treatment limited secondary to medical complications (Comment)   Medical Staff Made Aware PT present for co-eval 2* medical complexity, comorbidities and limited overall tolerance to activities   RUE Assessment   RUE Assessment WFL   LUE Assessment   LUE Assessment WFL   Cognition   Overall Cognitive Status WFL   Assessment   Limitation Decreased ADL status;Decreased endurance;Decreased self-care trans;Decreased high-level ADLs   Prognosis Good   Assessment Pt is a 69 y.o. male who was admitted to [facility] on 2/3/2025 with Closed fracture of cervical vertebra (HCC), vertebral artery dissection and IVH s/p snowboarding accident . Patient  has a past medical history of Arthritis, Closed fracture of cervical vertebra (HCC), Lumbar herniated disc, 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HCC), and 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HCC).  At baseline pt was completing adls and mobility independently- I iadls -shares homemaking with spouse. Pt lives with " wife in 1 story home with no FERNANDO. Currently pt requires min assist for overall ADLS and min assist for functional mobility/transfers. Pt currently presents with impairments in the following categories -difficulty performing ADLS and difficulty performing IADLS  activity tolerance, endurance, and standing balance/tolerance. These impairments, as well as pt's fatigue, pain, spinal precautions, and risk for falls  limit pt's ability to safely engage in all baseline areas of occupation, includingbathing, dressing, toileting, functional mobility/transfers, community mobility, laundry , driving, house maintenance, medication management, meal prep, cleaning, social participation , and leisure activities  From OT standpoint, recommend Level III resources upon D/C. OT will continue to follow to address the below stated goals.   Goals   Patient Goals go home   LTG Time Frame 10-14   Long Term Goal #1 1) Mod I UB/LB adls after setup   2)  Mod I toileting and clothing management  3) Mod I bed mobility  4) Mod I functional mob/transfers to and from all surfaces with fair+ to good balance/safety   5) Increase activity tolerance to 30-35min for participation in adls and enjoyable activities  6) Assess DME needs   7) Demonstrate good carryover with safe use of AD during functional tasks   8) Assess DME needs   9) Assist with safe d/c recommendations   Plan   Treatment Interventions ADL retraining;Functional transfer training;Endurance training;Patient/family training;Equipment evaluation/education;Compensatory technique education;Activityengagement   Goal Expiration Date 02/18/25   OT Frequency 2-3x/wk   Discharge Recommendation   Rehab Resource Intensity Level, OT III (Minimum Resource Intensity)   AM-PAC Daily Activity Inpatient   Lower Body Dressing 3   Bathing 3   Toileting 3   Upper Body Dressing 3   Grooming 3   Eating 4   Daily Activity Raw Score 19   Daily Activity Standardized Score (Calc for Raw Score >=11) 40.22   AM-PAC  Applied Cognition Inpatient   Following a Speech/Presentation 4   Understanding Ordinary Conversation 4   Taking Medications 4   Remembering Where Things Are Placed or Put Away 4   Remembering List of 4-5 Errands 4   Taking Care of Complicated Tasks 4   Applied Cognition Raw Score 24   Applied Cognition Standardized Score 62.21   End of Consult   Education Provided Yes   Patient Position at End of Consult Bedside chair;Bed/Chair alarm activated;All needs within reach   Nurse Communication Nurse aware of consult       The patient's raw score on the AM-PAC Daily Activity Inpatient Short Form is 19. A raw score of greater than or equal to 19 suggests the patient may benefit from discharge to home. Please refer to the recommendation of the Occupational Therapist for safe discharge planning.      Documentation Completed By:    CM Mccormick/L  [employer] Certified - [id number]

## 2025-02-04 NOTE — CONSULTS
Consultation - Acute Pain   Name: Peter Delarosa 69 y.o. male I MRN: [id number]  Unit/Bed#: ICU 05 I Date of Admission: 2/3/2025   Date of Service: 2/4/2025 I Hospital Day: 1   Inpatient consult to Acute Pain Service  Consult performed by: Jay Griffin MD  Consult ordered by: Tyler Joseph PA-C        Physician Requesting Evaluation: Shashi Campbell,*   Reason for Evaluation / Principal Problem: posttraumatic pain    Assessment & Plan  Closed fracture of cervical vertebra (HCC)  s/p snowboarding accident resulting in multiple cervical injuries      - continue ketamine gtt at 0.2mg/kg/hr    - increase oxycodone to 10mg/15mg PO q4h PRN moderate/severe pain    - continue dilaudid 0.5mg IV q2h PRN breakthrough pain    - patient likely requires higher opioid dosing given chronic home opioid/benzo use      - increase gabapentin to 100mg TID    - continue robaxin 750mg PO q6h isauro    - continue lidocaine 5% patch q12hr on/off    - continue acetaminophen 975mg PO q8h isauro    - start duloxetine 30mg PO daily    - start valium 5mg PO q6h PRN muscle spasms/anxiety    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HCC)    - see plan above    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HCC)    - see plan above        APS will continue to follow. Please contact Acute Pain Service - via SecureChat from [phone] with additional questions or concerns. See SecureChat or hulu for additional contacts and after hours information.     History of Present Illness    HPI: Peter Delarosa is a 69 y.o. year old male who presents after snowboarding accident resulting in cervical epidural hematoma, vertebral artery dissection, and closed fracture of cervical vertebra.  APS was consulted for posttraumatic pain.    Today, patient was resting in bed when I arrived to evaluate.  He reports that most his pain is due to severe muscle spasms and tightness of the neck and bilateral shoulders.  Currently patient is deferring surgical intervention.  He  reports that the medications are helpful but not quite enough.  Confirmed patient takes home alprazolam and hydrocodone-acetaminophen daily.  Discussed that we will increase the oxycodone given his tolerance from chronic home medications.  Also will start some valium to assist with anxiety and muscle spasm pains.  He feels the ketamine gtt may be doing more than he initially thought, and would like to continue.  He denies any oversedation, nausea, vomiting with the oral regimen and denies any hallucinations, night terrors, or visual disturbances with the ketamine gtt.  Patient was understanding and in agreement with the plan.  Discussed with ICU team.     Current pain location(s): Pain Score: 10  Pain Location/Orientation: Location: Back  Pain Scale: Pain Assessment Tool: 0-10  Current Analgesic regimen:  Tylenol, gabapentin, lidocaine 5% patch, robaxin, ketamine gtt, oxycodone PRN    Pain History: home alprazolam 0.5mg TID and hydrocodone-acetaminophen 10-325mg q4h  Pain Management Physician:  [facility]    I have reviewed the patient's controlled substance dispensing history in the Prescription Drug Monitoring Program in compliance with the [facility] regulations before prescribing any controlled substances.     Review of Systems   Constitutional:  Positive for fatigue. Negative for chills, diaphoresis and fever.   HENT:  Negative for hearing loss, nosebleeds, tinnitus and trouble swallowing.    Eyes:  Negative for photophobia and visual disturbance.   Respiratory:  Negative for choking, chest tightness, shortness of breath and stridor.    Cardiovascular:  Negative for chest pain and palpitations.   Gastrointestinal:  Negative for abdominal pain, diarrhea, nausea and vomiting.   Musculoskeletal:  Positive for back pain, myalgias, neck pain and neck stiffness.   Neurological:  Negative for tremors, seizures, syncope, speech difficulty and weakness.   Hematological:  Negative for adenopathy. Does not  bruise/bleed easily.   Psychiatric/Behavioral:  Negative for agitation, behavioral problems, confusion and hallucinations.      I have reviewed the patient's PMH, PSH, Social History, Family History, Meds, and Allergies    Objective :  Temp:  [97.8 °F (36.6 °C)-98.6 °F (37 °C)] 98.6 °F (37 °C)  HR:  [66-85] 78  BP: (123-184)/() 164/83  Resp:  [13-30] 22  SpO2:  [93 %-99 %] 96 %  O2 Device: None (Room air)    Physical Exam  Vitals and nursing note reviewed.   Constitutional:       General: He is not in acute distress.     Appearance: Normal appearance. He is not ill-appearing, toxic-appearing or diaphoretic.   HENT:      Head: Normocephalic and atraumatic.      Nose: Nose normal.      Mouth/Throat:      Mouth: Mucous membranes are moist.      Pharynx: Oropharynx is clear.   Eyes:      General: No scleral icterus.     Conjunctiva/sclera: Conjunctivae normal.   Neck:      Comments: Collar in place  Cardiovascular:      Rate and Rhythm: Normal rate and regular rhythm.      Pulses: Normal pulses.      Heart sounds: Normal heart sounds.   Pulmonary:      Effort: Pulmonary effort is normal.      Breath sounds: Normal breath sounds.   Abdominal:      General: Abdomen is flat. There is no distension.      Palpations: Abdomen is soft.      Tenderness: There is no abdominal tenderness.   Musculoskeletal:      Cervical back: Tenderness present.   Neurological:      General: No focal deficit present.      Mental Status: He is alert and oriented to person, place, and time. Mental status is at baseline.      Sensory: No sensory deficit.      Motor: No weakness.   Psychiatric:         Mood and Affect: Mood normal.         Behavior: Behavior normal.         Thought Content: Thought content normal.         Judgment: Judgment normal.            Lab Results: I have reviewed the following results:  Estimated Creatinine Clearance: 65.4 mL/min (by C-G formula based on SCr of 1.22 mg/dL).  Lab Results   Component Value Date    WBC  6.50 02/04/2025    HGB 14.1 02/04/2025    HCT 42.2 02/04/2025     (L) 02/04/2025         Component Value Date/Time    K 3.8 02/04/2025 0453    K 4.1 04/22/2024 0930     02/04/2025 0453     04/22/2024 0930    CO2 28 02/04/2025 0453    CO2 31 04/22/2024 0930    BUN 18 02/04/2025 0453    BUN 16 04/22/2024 0930    CREATININE 1.22 02/04/2025 0453    CREATININE 1.18 04/22/2024 0930         Component Value Date/Time    CALCIUM 8.9 02/04/2025 0453    CALCIUM 9.9 04/22/2024 0930    ALKPHOS 55 02/03/2025 1146    ALKPHOS 59 04/22/2024 0930    AST 41 (H) 02/03/2025 1146    AST 29 04/22/2024 0930    ALT 13 02/03/2025 1146    ALT 12 04/22/2024 0930    TP 6.6 02/03/2025 1146    TP 6.9 04/22/2024 0930    ALB 4.7 02/03/2025 1146    ALB 5 04/22/2024 0930

## 2025-02-04 NOTE — PROGRESS NOTES
Progress Note - Neurosurgery   Name: Peter Delarosa 69 y.o. male I MRN: [id number]  Unit/Bed#: ICU 05 I Date of Admission: 2/3/2025   Date of Service: 2/4/2025 I Hospital Day: 1    Assessment & Plan  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HCC)  Patient presented s/p snowboarding accident   Travelling at high rate of speed colliding with 2 fences    Imaging:   CTA head and neck 2/3/2025: Cervical spinal epidural hematoma from atleast C3-7. MRI recommended. Acute comminuted C5 articular pillar, non displaced fracture of R C6 tp, and non displaced fracture of L C6 vertebral body extending to the L transverse foramen. Occluded R vertebral artery V2 segment and V3 segment with areas of stenosis concerning for traumatic dissection.     Plan:   ongoing frequent neurological checks.   Recommend MRI cervical spine for evaluation of cervical epidural hematoma   Patient currently with sensation and motor strength intact  No emergent surgical needs, but patient should continue to have close neurological monitoring and may require surgery should worsening symptoms develop  DVT ppx: SCD's only.  Hold all AC/AP medication at this time  Will determine timing of AP medication for vertebral artery injury at a later time  Ongoing medical management and pain control per primary team.   CM following for dispo planning.   Neurosurgery will continue to follow. Call with questions or concerns.     Closed fracture of cervical vertebra (HCC)  Acute comminuted C5 articular pillar  non displaced fracture of R C6 tp  non displaced fracture of L C6 vertebral body extending to the L transverse foramen.  S/p snowboarding accident 2/3    Imaging:  MRI cervical spine wo, 2/3/25: 1. Long segment epidural hematoma as described above resulting in severe central stenosis at C3-4, C4-5, and C5-C6 and moderate to severe stenosis at C7-T1. There is multifocal mass effect on the cord which is moderate to severe at C5-C6. Cord signal appears normal.  2. Multifocal ligamentous injury including ALL and likely PLL injury at C5-C6, disruption of the disc at C5-C6, ligamentum flavum discontinuity at C6-C7, facet capsular injury at C5-C6 on the right, and diffuse interspinous ligament edema. 3. Widened right facet at C5-C6 with right C5 facet fracture as seen on CT. The fractures involving the transverse processes at C6 are not identified on MR. 4. Loss of the right vertebral artery flow-void consistent with the occlusion seen on CTA.    Plan:   Vista collar to be worn at all time   Misty collar for showering   Monitor neurologically   Will review upright cervical XR when completed  Discussed option for operative management vs conservative management of fractures - patient adamantly declines surgical intervention at this time. Discussed that pending progress, may require surgery in the future.    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HCC)  Noted on CTA   In setting of high speed trauma and cervical fractures   No AC/AP at this time in setting of cervical epidural hematoma  Once cleared patient should be initiated on at least ASA therapy, but likely should be on DAPT if able     IVH (intraventricular hemorrhage) (HCC)  Trace traumatic IVH  S/p snowboard accident    Imaging:   MRI brain w/wo, 2/3/25: Trace intraventricular hemorrhage in posterior aspect of both lateral ventricles. Absent flow-related signal in right vertebral artery V4 segment, likely due to reversal of flow given traumatic dissection injury of right vertebral artery more proximally seen on earlier same day CTA head and neck with and without contrast. Chronic small infarct in right lateral frontal lobe with mild chronic microangiopathy.    Plan:  Continue to monitor neuro exam closely.  Repeat CT head for stability pending.  Likely will not require intervention.       I have discussed the above management plan in detail with the primary service.   Neurosurgery service will follow.  Please contact the SecureChat  role for the Neurosurgery service with any questions/concerns.    Subjective   Resting in bed.   Reports significant, poorly controlled pain to posterior neck.  Ketamine gtt has been helping.  Denies numbness/weakness.    Objective :  Temp:  [97.9 °F (36.6 °C)-98.6 °F (37 °C)] 98.6 °F (37 °C)  HR:  [66-85] 70  BP: (123-184)/(57-95) 126/57  Resp:  [13-30] 22  SpO2:  [93 %-98 %] 96 %  O2 Device: None (Room air)    I/O         02/02 0701  02/03 0700 02/03 0701  02/04 0700 02/04 0701  02/05 0700    I.V. (mL/kg)  1256.3 (15.3) 806.3 (9.8)    Total Intake(mL/kg)  1256.3 (15.3) 806.3 (9.8)    Urine (mL/kg/hr)  800     Total Output  800     Net  +456.3 +806.3                 Physical Exam  Vitals and nursing note reviewed.   Constitutional:       General: He is not in acute distress.     Appearance: He is well-developed.   HENT:      Head: Normocephalic and atraumatic.   Eyes:      General: Lids are normal.      Extraocular Movements: Extraocular movements intact.      Conjunctiva/sclera: Conjunctivae normal.      Pupils: Pupils are equal, round, and reactive to light.   Neck:      Comments: [place] brace in place  Cardiovascular:      Rate and Rhythm: Normal rate and regular rhythm.      Heart sounds: No murmur heard.  Pulmonary:      Effort: Pulmonary effort is normal. No respiratory distress.      Breath sounds: Normal breath sounds.   Abdominal:      Palpations: Abdomen is soft.      Tenderness: There is no abdominal tenderness.   Musculoskeletal:         General: No swelling.   Skin:     General: Skin is warm and dry.      Capillary Refill: Capillary refill takes less than 2 seconds.   Neurological:      Mental Status: He is alert.      Motor: Motor strength is normal.  Psychiatric:         Mood and Affect: Mood normal.      Neurological Exam  Mental Status  Alert.    Cranial Nerves  CN II: Visual acuity is normal. Visual fields full to confrontation.  CN III, IV, VI: Extraocular movements intact bilaterally.  Normal lids and orbits bilaterally. Pupils equal round and reactive to light bilaterally.  CN V: Facial sensation is normal.  CN VII: Full and symmetric facial movement.  CN VIII: Hearing is normal.  CN IX, X: Palate elevates symmetrically. Normal gag reflex.  CN XI: Shoulder shrug strength is normal.  CN XII: Tongue midline without atrophy or fasciculations.    Motor   Strength is 5/5 throughout all four extremities.        Lab Results: I have reviewed the following results:  Recent Labs     02/03/25  1146 02/04/25  0453   WBC  --  6.50   HGB  --  14.1   HCT  --  42.2   PLT  --  147*   SODIUM 138 138   K 3.9 3.8    104   CO2 26 28   BUN 18 18   CREATININE 1.28 1.22   GLUC 101 90   MG  --  2.0   PHOS  --  3.1   AST 41*  --    ALT 13  --    ALB 4.7  --    TBILI 0.83  --    ALKPHOS 55  --    PTT 29  --    INR 1.01  --        Imaging Results Review: I personally reviewed the following image studies in PACS and associated radiology reports: MRI brain and MRI spine. My interpretation of the radiology images/reports is: as above.  Other Study Results Review: No additional pertinent studies reviewed.    VTE Pharmacologic Prophylaxis: VTE covered by:    None     and Sequential compression device (Venodyne)

## 2025-02-04 NOTE — CASE MANAGEMENT
Case Management Assessment & Discharge Planning Note    Patient name Peter Delarosa  Location ICU 05/ICU 05 MRN [id number]  : 1955 Date 2025       Current Admission Date: 2/3/2025  Current Admission Diagnosis:Closed fracture of cervical vertebra (HCC)   Patient Active Problem List    Diagnosis Date Noted Date Diagnosed    IVH (intraventricular hemorrhage) (HCC) 2025     Closed fracture of cervical vertebra (HCC) 2025     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HCC) 2025     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HCC) 2025     Epicondylitis elbow, medial, left 2020     Trochanteric bursitis of right hip 2020     Primary osteoarthritis of both knees 2020     Primary osteoarthritis of both shoulders 2020       LOS (days): 1  Geometric Mean LOS (GMLOS) (days):   Days to GMLOS:     OBJECTIVE:    Risk of Unplanned Readmission Score: 10.47         Current admission status: Inpatient       [facility]:   [id number] [address] [facility]  [address] [place] [id number]  Phone: [phone] Fax: [phone]    [id number] - [place] - [address] [facility]  [address] [facility] [address]  Phone: [phone] Fax: [phone]    Primary Care Provider: Michael Leung MD    Primary Insurance: MEDICARE  Secondary Insurance: AETNA    ASSESSMENT:  Active Health Care Proxies    There are no active Health Care Proxies on file.       Advance Directives  Primary Contact: Greta Delarosa (Spouse) [phone]    Readmission Root Cause  30 Day Readmission: No    Patient Information  Admitted from:: Home  Mental Status: Alert  During Assessment patient was accompanied by: Spouse  Assessment information provided by:: Patient  Primary Caregiver: Self  Support Systems: Self, Spouse/significant other  County of Residence: [place]  [place]  Home entry access options. Select all  that apply.: No steps to enter home  Type of Current Residence: [facility]  Living Arrangements: Lives w/ Spouse/significant other  Is patient a ?: No    Activities of Daily Living Prior to Admission  Functional Status: Independent  Completes ADLs independently?: Yes  Ambulates independently?: Yes  Does patient use assisted devices?: No  Does patient currently own DME?: Yes  What DME does the patient currently own?: Straight Cane, Crutches  Does patient have a history of Outpatient Therapy (PT/OT)?: Yes  Does the patient have a history of Short-Term Rehab?: No  Does patient have a history of HHC?: No  Does patient currently have HHC?: No    Patient Information Continued  Income Source: Pension/snf  Does patient have prescription coverage?: Yes  Does patient receive dialysis treatments?: No  Does patient have a history of substance abuse?: Yes  Historical substance use preference: Alcohol/ETOH  History of Withdrawal Symptoms: Denies past symptoms  Is patient currently in treatment for substance abuse?: N/A - sober  Does patient have a history of Mental Health Diagnosis?: No    Means of Transportation  Means of Transport to Appts:: Drives Self    DISCHARGE DETAILS:    Discharge planning discussed with:: patient and spouse  Freedom of Choice: Yes     CM contacted family/caregiver?: Yes  Were Treatment Team discharge recommendations reviewed with patient/caregiver?: Yes     Were patient/caregiver advised of the risks associated with not following Treatment Team discharge recommendations?: Yes    Contacts  Patient Contacts: Greta Delarosa (Spouse) [phone]  Relationship to Patient:: Family  Contact Method: Phone  Phone Number: [phone]  Reason/Outcome: Emergency Contact, Continuity of Care, Discharge Planning    CM met with pt and his spouse to discuss the role of CM  Pt lives with spouse in a 1 story home which has 0E. Pt's bathroom is a tub/shower with grab bars and standard toilet  Pt drives. Pt is  retired. Pt is fully independent for ADL/iADLs  Pt enjoys snowboarding, [place] kayaking, and gardening. Pt owns an SPC and crutches. Pt's had 1 fall  [facility]  Pt has a very remote hx of ETOH abuse.

## 2025-02-04 NOTE — PLAN OF CARE
Problem: PAIN - ADULT  Goal: Verbalizes/displays adequate comfort level or baseline comfort level  Description: Interventions:  - Encourage patient to monitor pain and request assistance  - Assess pain using appropriate pain scale  - Administer analgesics based on type and severity of pain and evaluate response  - Implement non-pharmacological measures as appropriate and evaluate response  - Consider cultural and social influences on pain and pain management  - Notify physician/advanced practitioner if interventions unsuccessful or patient reports new pain  Outcome: Progressing     Problem: INFECTION - ADULT  Goal: Absence or prevention of progression during hospitalization  Description: INTERVENTIONS:  - Assess and monitor for signs and symptoms of infection  - Monitor lab/diagnostic results  - Monitor all insertion sites, i.e. indwelling lines, tubes, and drains  - Monitor endotracheal if appropriate and nasal secretions for changes in amount and color  - [place] appropriate cooling/warming therapies per order  - Administer medications as ordered  - Instruct and encourage patient and family to use good hand hygiene technique  - Identify and instruct in appropriate isolation precautions for identified infection/condition  Outcome: Progressing  Goal: Absence of fever/infection during neutropenic period  Description: INTERVENTIONS:  - Monitor WBC    Outcome: Progressing     Problem: SAFETY ADULT  Goal: Patient will remain free of falls  Description: INTERVENTIONS:  - Educate patient/family on patient safety including physical limitations  - Instruct patient to call for assistance with activity   - Consult OT/PT to assist with strengthening/mobility   - Keep Call bell within reach  - Keep bed low and locked with side rails adjusted as appropriate  - Keep care items and personal belongings within reach  - Initiate and maintain comfort rounds  - Make Fall Risk Sign visible to staff  - Offer Toileting every  Hours, in  advance of need  - Initiate/Maintain alarm  - Obtain necessary fall risk management equipment:   - Apply yellow socks and bracelet for high fall risk patients  - Consider moving patient to room near nurses station  Outcome: Progressing  Goal: Maintain or return to baseline ADL function  Description: INTERVENTIONS:  -  Assess patient's ability to carry out ADLs; assess patient's baseline for ADL function and identify physical deficits which impact ability to perform ADLs (bathing, care of mouth/teeth, toileting, grooming, dressing, etc.)  - Assess/evaluate cause of self-care deficits   - Assess range of motion  - Assess patient's mobility; develop plan if impaired  - Assess patient's need for assistive devices and provide as appropriate  - Encourage maximum independence but intervene and supervise when necessary  - Involve family in performance of ADLs  - Assess for home care needs following discharge   - Consider OT consult to assist with ADL evaluation and planning for discharge  - Provide patient education as appropriate  Outcome: Progressing  Goal: Maintains/Returns to pre admission functional level  Description: INTERVENTIONS:  - Perform AM-PAC 6 Click Basic Mobility/ Daily Activity assessment daily.  - Set and communicate daily mobility goal to care team and patient/family/caregiver.   - Collaborate with rehabilitation services on mobility goals if consulted  - Perform Range of Motion  times a day.  - Reposition patient every  hours.  - Dangle patient  times a day  - Stand patient  times a day  - Ambulate patient  times a day  - Out of bed to chair  times a day   - Out of bed for meal times a day  - Out of bed for toileting  - Record patient progress and toleration of activity level   Outcome: Progressing     Problem: DISCHARGE PLANNING  Goal: Discharge to home or other facility with appropriate resources  Description: INTERVENTIONS:  - Identify barriers to discharge w/patient and caregiver  - Arrange for  needed discharge resources and transportation as appropriate  - Identify discharge learning needs (meds, wound care, etc.)  - Arrange for interpretive services to assist at discharge as needed  - Refer to Case Management Department for coordinating discharge planning if the patient needs post-hospital services based on physician/advanced practitioner order or complex needs related to functional status, cognitive ability, or social support system  Outcome: Progressing     Problem: Knowledge Deficit  Goal: Patient/family/caregiver demonstrates understanding of disease process, treatment plan, medications, and discharge instructions  Description: Complete learning assessment and assess knowledge base.  Interventions:  - Provide teaching at level of understanding  - Provide teaching via preferred learning methods  Outcome: Progressing     Problem: Prexisting or High Potential for Compromised Skin Integrity  Goal: Skin integrity is maintained or improved  Description: INTERVENTIONS:  - Identify patients at risk for skin breakdown  - Assess and monitor skin integrity  - Assess and monitor nutrition and hydration status  - Monitor labs   - Assess for incontinence   - Turn and reposition patient  - Assist with mobility/ambulation  - Relieve pressure over bony prominences  - Avoid friction and shearing  - Provide appropriate hygiene as needed including keeping skin clean and dry  - Evaluate need for skin moisturizer/barrier cream  - Collaborate with interdisciplinary team   - Patient/family teaching  - Consider wound care consult   Outcome: Progressing

## 2025-02-04 NOTE — PROGRESS NOTES
"Progress Note - Critical Care/ICU   Name: Peter Delarosa 69 y.o. male I MRN: [id number]  Unit/Bed#: ICU 05 I Date of Admission: 2/3/2025   Date of Service: 2/4/2025 I Hospital Day: 1       Assessment & Plan   Neuro:   Diagnosis: Bilateral Intraventricular hemorrhage  MRI brain 2/3:  \"Trace intraventricular hemorrhage in posterior aspect of both lateral ventricles.\"  Plan:   Neuro intact  NS following  Holding AC/AP/DVT PPx    Diagnosis: Right vertebral artery dissection at V2 and V3  S/p Skiing fall  CTA 2/3  \"Occluded right vertebral artery V2 segment (proximally and distally) and V3 segment with multifocal areas of stenotic reconstituted flow, likely due to traumatic dissection injury.\"  MRI 2/3  \"Absent flow-related signal in right vertebral artery V4 segment, likely due to reversal of flow given traumatic dissection injury of right vertebral artery more proximally seen on earlier same day CTA head and neck with and without contrast.\"  NS following  Plan:   Unfortunately due to IVH and epidural hematoma, cannot use AC at this time  NS following  Q1 neurochecks for now    Diagnosis: Acute comminuted fracture of right C5 articular pillar, acute displaced fracture of right C6 transverse process fracture, Long segment epidural hematoma with severe stenosis at C3-4, C4-5 and C5-6  CT C Spine:  \"Acute displaced fractures involving the bilateral C5 transverse processes with displacement into the transverse foramen on the left. Mildly displaced fracture of the right C6 articular facet with extension to both superior and inferior surfaces. Ventral epidural hematoma from C3-C6/7 with narrowing of the spinal canal.\"  MRI:  \"Long segment epidural hematoma as described above resulting in severe central stenosis at C3-4, C4-5, and C5-C6 and moderate to severe stenosis at C7-T1. There is multifocal mass effect on the cord which is moderate to severe at C5-C6. Cord signal appears normal.  Multifocal ligamentous injury " "including ALL and likely PLL injury at C5-C6, disruption of the disc at C5-C6, ligamentum flavum discontinuity at C6-C7, facet capsular injury at C5-C6 on the right, and diffuse interspinous ligament edema   Widened right facet at C5-C6 with right C5 facet fracture as seen on CT. The fractures involving the transverse processes at C6 are not identified on MR.\"  Plan:   NS following  Rigid C Collar   Q1 neuro checks  May require decompression    Diagnosis: Acute pain due to trauma  Significant pain overnight due to \"muscle cramping\"  Has been refractory to narcotics/muscle relaxants, gabapentin  Plan:   Begin ketamine 0.2 mg/kg/hr  APS consult    CV:   No active issues     Pulm:  No active issues    GI:   No active issues    :   No active issues    F/E/N:   Fluids:   Electrolytes: Replete for K >4, <5 ; Phos > 3; and Mag > 2  Nutrition: NPO for ? Neurosurgical eval     Heme/Onc:   No active issues    Endo:   No active issues    ID:   No active issues    MSK/Skin:   No active issues  Disposition: Stepdown Level 1    ICU Core Measures     A: Assess, Prevent, and Manage Pain Has pain been assessed? Yes  Need for changes to pain regimen? Yes   B: Both SAT/SAT  N/A   C: Choice of Sedation RASS Goal: N/A patient not on sedation  Need for changes to sedation or analgesia regimen? NA   D: Delirium CAM-ICU: Negative   E: Early Mobility  Plan for early mobility? Yes   F: Family Engagement Plan for family engagement today? Yes         Prophylaxis:  VTE Contraindicated secondary to: epidural and IV hematoma   Stress Ulcer  covered bypantoprazole (PROTONIX) EC tablet 40 mg [[id number]]         HPI  69 year old male presented with after a snowboarding accident with multiple cervical spine fractures, cervical epidural hematoma, intraventricular hematoma, and vertebral artery dissection     24 Hour Events :   Exam remained stable  Significant pain overnight  Subjective   Review of Systems: Review of Systems   Musculoskeletal: "  Positive for arthralgias, back pain and joint swelling.   All other systems reviewed and are negative.      Objective :                   Vitals I/O      Most Recent Min/Max in 24hrs   Temp 98 °F (36.7 °C) Temp  Min: 97.8 °F (36.6 °C)  Max: 98 °F (36.7 °C)   Pulse 66 Pulse  Min: 66  Max: 85   Resp 20 Resp  Min: 14  Max: 25   /61 BP  Min: 123/61  Max: 184/95   O2 Sat 93 % SpO2  Min: 93 %  Max: 99 %      Intake/Output Summary (Last 24 hours) at 2/4/2025 0056  Last data filed at 2/4/2025 0000  Gross per 24 hour   Intake 1006.25 ml   Output 400 ml   Net 606.25 ml       Diet NPO; Sips with meds    Invasive Monitoring           Physical Exam   Physical Exam  Vitals and nursing note reviewed.   Eyes:      Conjunctiva/sclera: Conjunctivae normal.   Skin:     General: Skin is warm and dry.      Capillary Refill: Capillary refill takes less than 2 seconds.   HENT:      Head: Normocephalic and atraumatic.      Mouth/Throat:      Mouth: Mucous membranes are moist.   Cardiovascular:      Rate and Rhythm: Normal rate and regular rhythm.   Musculoskeletal:         General: Normal range of motion.   Abdominal: General: Bowel sounds are normal. There is no distension.      Palpations: Abdomen is soft.      Tenderness: There is no abdominal tenderness.   Constitutional:       Appearance: He is well-developed and well-nourished.      Interventions: Cervical collar in place.   Pulmonary:      Effort: Pulmonary effort is normal.      Breath sounds: Normal breath sounds.   Neurological:      General: No focal deficit present.      Mental Status: He is alert and oriented to person, place and time. Mental status is at baseline.      Motor: Strength full and intact in all extremities.   Genitourinary/Anorectal:     Comments: Deferred         Diagnostic Studies        Lab Results: I have reviewed the following results:     Medications:  Scheduled PRN   acetaminophen, 975 mg, Q8H NOE  chlorhexidine, 15 mL, Q12H NOE  gabapentin, 100 mg,  HS  lidocaine, 1 patch, Daily  pantoprazole, 40 mg, Early Morning  senna-docusate sodium, 2 tablet, BID      bisacodyl, 10 mg, Daily PRN  HYDROmorphone, 0.5 mg, Q2H PRN  ondansetron, 4 mg, Q4H PRN  oxyCODONE, 5 mg, Q4H PRN   Or  oxyCODONE, 10 mg, Q4H PRN       Continuous    lactated ringers, 125 mL/hr, Last Rate: 125 mL/hr (02/04/25 0050)         Labs:   CBC    Recent Labs     02/03/25  1118   WBC 8.95   HGB 15.0   HCT 45.1        BMP    Recent Labs     02/03/25  1146   SODIUM 138   K 3.9      CO2 26   AGAP 9   BUN 18   CREATININE 1.28   CALCIUM 9.3       Coags    Recent Labs     02/03/25  1146   INR 1.01   PTT 29        Additional Electrolytes  No recent results       Blood Gas    No recent results  No recent results LFTs  Recent Labs     02/03/25  1146   ALT 13   AST 41*   ALKPHOS 55   ALB 4.7   TBILI 0.83       Infectious  No recent results  Glucose  Recent Labs     02/03/25  1146   GLUC 101

## 2025-02-05 ENCOUNTER — TELEPHONE (OUTPATIENT)
Dept: NEUROSURGERY | Facility: CLINIC | Age: 70
End: 2025-02-05

## 2025-02-05 LAB
ANION GAP SERPL CALCULATED.3IONS-SCNC: 8 MMOL/L (ref 4–13)
BASOPHILS # BLD AUTO: 0.02 THOUSANDS/ΜL (ref 0–0.1)
BASOPHILS NFR BLD AUTO: 0 % (ref 0–1)
BUN SERPL-MCNC: 15 MG/DL (ref 5–25)
CALCIUM SERPL-MCNC: 8.4 MG/DL (ref 8.4–10.2)
CHLORIDE SERPL-SCNC: 105 MMOL/L (ref 96–108)
CO2 SERPL-SCNC: 24 MMOL/L (ref 21–32)
CREAT SERPL-MCNC: 1.1 MG/DL (ref 0.6–1.3)
EOSINOPHIL # BLD AUTO: 0.13 THOUSAND/ΜL (ref 0–0.61)
EOSINOPHIL NFR BLD AUTO: 2 % (ref 0–6)
ERYTHROCYTE [DISTWIDTH] IN BLOOD BY AUTOMATED COUNT: 12.5 % (ref 11.6–15.1)
GFR SERPL CREATININE-BSD FRML MDRD: 67 ML/MIN/1.73SQ M
GLUCOSE SERPL-MCNC: 120 MG/DL (ref 65–140)
HCT VFR BLD AUTO: 41 % (ref 36.5–49.3)
HGB BLD-MCNC: 13.1 G/DL (ref 12–17)
IMM GRANULOCYTES # BLD AUTO: 0.01 THOUSAND/UL (ref 0–0.2)
IMM GRANULOCYTES NFR BLD AUTO: 0 % (ref 0–2)
LYMPHOCYTES # BLD AUTO: 0.5 THOUSANDS/ΜL (ref 0.6–4.47)
LYMPHOCYTES NFR BLD AUTO: 9 % (ref 14–44)
MCH RBC QN AUTO: 29.2 PG (ref 26.8–34.3)
MCHC RBC AUTO-ENTMCNC: 32 G/DL (ref 31.4–37.4)
MCV RBC AUTO: 91 FL (ref 82–98)
MONOCYTES # BLD AUTO: 0.53 THOUSAND/ΜL (ref 0.17–1.22)
MONOCYTES NFR BLD AUTO: 10 % (ref 4–12)
NEUTROPHILS # BLD AUTO: 4.34 THOUSANDS/ΜL (ref 1.85–7.62)
NEUTS SEG NFR BLD AUTO: 79 % (ref 43–75)
NRBC BLD AUTO-RTO: 0 /100 WBCS
PLATELET # BLD AUTO: 134 THOUSANDS/UL (ref 149–390)
PMV BLD AUTO: 10.9 FL (ref 8.9–12.7)
POTASSIUM SERPL-SCNC: 4.1 MMOL/L (ref 3.5–5.3)
RBC # BLD AUTO: 4.49 MILLION/UL (ref 3.88–5.62)
SODIUM SERPL-SCNC: 137 MMOL/L (ref 135–147)
WBC # BLD AUTO: 5.53 THOUSAND/UL (ref 4.31–10.16)

## 2025-02-05 PROCEDURE — 80048 BASIC METABOLIC PNL TOTAL CA: CPT | Performed by: PHYSICIAN ASSISTANT

## 2025-02-05 PROCEDURE — 85025 COMPLETE CBC W/AUTO DIFF WBC: CPT | Performed by: PHYSICIAN ASSISTANT

## 2025-02-05 PROCEDURE — 99232 SBSQ HOSP IP/OBS MODERATE 35: CPT | Performed by: SURGERY

## 2025-02-05 PROCEDURE — 97530 THERAPEUTIC ACTIVITIES: CPT

## 2025-02-05 PROCEDURE — 99232 SBSQ HOSP IP/OBS MODERATE 35: CPT | Performed by: PHYSICIAN ASSISTANT

## 2025-02-05 PROCEDURE — 97116 GAIT TRAINING THERAPY: CPT

## 2025-02-05 RX ORDER — HYDROMORPHONE HYDROCHLORIDE 2 MG/1
6 TABLET ORAL EVERY 4 HOURS PRN
Refills: 0 | Status: DISCONTINUED | OUTPATIENT
Start: 2025-02-05 | End: 2025-02-07 | Stop reason: HOSPADM

## 2025-02-05 RX ORDER — LIDOCAINE 50 MG/G
2 PATCH TOPICAL DAILY
Status: DISCONTINUED | OUTPATIENT
Start: 2025-02-05 | End: 2025-02-07 | Stop reason: HOSPADM

## 2025-02-05 RX ORDER — NALOXONE HYDROCHLORIDE 0.4 MG/ML
0.1 INJECTION, SOLUTION INTRAMUSCULAR; INTRAVENOUS; SUBCUTANEOUS
Status: DISCONTINUED | OUTPATIENT
Start: 2025-02-05 | End: 2025-02-07 | Stop reason: HOSPADM

## 2025-02-05 RX ORDER — HYDROMORPHONE HYDROCHLORIDE 4 MG/1
4 TABLET ORAL EVERY 4 HOURS PRN
Refills: 0 | Status: DISCONTINUED | OUTPATIENT
Start: 2025-02-05 | End: 2025-02-07 | Stop reason: HOSPADM

## 2025-02-05 RX ADMIN — LIDOCAINE 2 PATCH: 700 PATCH TOPICAL at 11:49

## 2025-02-05 RX ADMIN — HYDROMORPHONE HYDROCHLORIDE 0.5 MG: 1 INJECTION, SOLUTION INTRAMUSCULAR; INTRAVENOUS; SUBCUTANEOUS at 09:10

## 2025-02-05 RX ADMIN — ACETAMINOPHEN 975 MG: 325 TABLET, FILM COATED ORAL at 05:30

## 2025-02-05 RX ADMIN — HYDROMORPHONE HYDROCHLORIDE 6 MG: 2 TABLET ORAL at 15:47

## 2025-02-05 RX ADMIN — PANTOPRAZOLE SODIUM 40 MG: 40 TABLET, DELAYED RELEASE ORAL at 05:30

## 2025-02-05 RX ADMIN — DIAZEPAM 5 MG: 5 TABLET ORAL at 00:00

## 2025-02-05 RX ADMIN — HYDROMORPHONE HYDROCHLORIDE 0.5 MG: 1 INJECTION, SOLUTION INTRAMUSCULAR; INTRAVENOUS; SUBCUTANEOUS at 17:14

## 2025-02-05 RX ADMIN — GABAPENTIN 100 MG: 100 CAPSULE ORAL at 15:47

## 2025-02-05 RX ADMIN — SENNOSIDES AND DOCUSATE SODIUM 2 TABLET: 50; 8.6 TABLET ORAL at 08:27

## 2025-02-05 RX ADMIN — METHOCARBAMOL 750 MG: 750 TABLET ORAL at 11:49

## 2025-02-05 RX ADMIN — DIAZEPAM 5 MG: 5 TABLET ORAL at 13:23

## 2025-02-05 RX ADMIN — HYDROMORPHONE HYDROCHLORIDE 0.5 MG: 1 INJECTION, SOLUTION INTRAMUSCULAR; INTRAVENOUS; SUBCUTANEOUS at 21:20

## 2025-02-05 RX ADMIN — DULOXETINE 30 MG: 30 CAPSULE, DELAYED RELEASE ORAL at 08:27

## 2025-02-05 RX ADMIN — HYDROMORPHONE HYDROCHLORIDE 0.5 MG: 1 INJECTION, SOLUTION INTRAMUSCULAR; INTRAVENOUS; SUBCUTANEOUS at 05:30

## 2025-02-05 RX ADMIN — METHOCARBAMOL 750 MG: 750 TABLET ORAL at 00:00

## 2025-02-05 RX ADMIN — SENNOSIDES AND DOCUSATE SODIUM 2 TABLET: 50; 8.6 TABLET ORAL at 17:14

## 2025-02-05 RX ADMIN — HYDROMORPHONE HYDROCHLORIDE 0.5 MG: 1 INJECTION, SOLUTION INTRAMUSCULAR; INTRAVENOUS; SUBCUTANEOUS at 11:49

## 2025-02-05 RX ADMIN — METHOCARBAMOL 750 MG: 750 TABLET ORAL at 17:14

## 2025-02-05 RX ADMIN — OXYCODONE HYDROCHLORIDE 15 MG: 10 TABLET ORAL at 12:35

## 2025-02-05 RX ADMIN — HYDROMORPHONE HYDROCHLORIDE 0.5 MG: 1 INJECTION, SOLUTION INTRAMUSCULAR; INTRAVENOUS; SUBCUTANEOUS at 15:16

## 2025-02-05 RX ADMIN — DIAZEPAM 5 MG: 5 TABLET ORAL at 19:30

## 2025-02-05 RX ADMIN — HYDROMORPHONE HYDROCHLORIDE 0.5 MG: 1 INJECTION, SOLUTION INTRAMUSCULAR; INTRAVENOUS; SUBCUTANEOUS at 23:30

## 2025-02-05 RX ADMIN — LIDOCAINE 5% 1 PATCH: 700 PATCH TOPICAL at 08:27

## 2025-02-05 RX ADMIN — OXYCODONE HYDROCHLORIDE 15 MG: 10 TABLET ORAL at 03:00

## 2025-02-05 RX ADMIN — DIAZEPAM 5 MG: 5 TABLET ORAL at 06:50

## 2025-02-05 RX ADMIN — METHOCARBAMOL 750 MG: 750 TABLET ORAL at 05:30

## 2025-02-05 RX ADMIN — ACETAMINOPHEN 975 MG: 325 TABLET, FILM COATED ORAL at 12:35

## 2025-02-05 RX ADMIN — GABAPENTIN 100 MG: 100 CAPSULE ORAL at 21:20

## 2025-02-05 RX ADMIN — ACETAMINOPHEN 975 MG: 325 TABLET, FILM COATED ORAL at 21:20

## 2025-02-05 RX ADMIN — OXYCODONE HYDROCHLORIDE 15 MG: 10 TABLET ORAL at 08:28

## 2025-02-05 RX ADMIN — GABAPENTIN 100 MG: 100 CAPSULE ORAL at 08:28

## 2025-02-05 RX ADMIN — HYDROMORPHONE HYDROCHLORIDE 6 MG: 2 TABLET ORAL at 20:10

## 2025-02-05 RX ADMIN — METHOCARBAMOL 750 MG: 750 TABLET ORAL at 23:30

## 2025-02-05 NOTE — ASSESSMENT & PLAN NOTE
s/p snowboarding accident resulting in multiple cervical injuries including epidural hematoma causing severe spinal stenosis, ligamentous injury, C5-6 fracture  Has been evaluated by neurosurgery, will continue with conservative management for now but may require surgical intervention down the line (thus far patient adamantly declining surgery)  He continues to have sporadic pain in his shoulder blades and shoulder bilaterally, feels pain regimen works sometimes and other times note.  Did discuss utilizing valium for given more muscular nature of pain.  Also discussed opioid rotation to PO dilaudid however patient is on the fence.  Will keep regimen more or less the same and reconsider opioid rotation tomorrow with plan to decrease IV dilaudid tomorrow as well.     Regimen:  Ketamine discontinued 2/4/2025 secondary to mild hallucinations  Continue Tylenol 975 mg every 8 hours scheduled  Continue Cymbalta 30 mg daily  Continue gabapentin 100 mg 3 times daily  Increase lidocaine patches to 2 on bilateral shoulders, 12 hours on and 12 hours off  Continue Robaxin 750mg every 6 hours scheduled  Continue Valium 5 mg every 6 hours as needed for muscle spasm  Encouraged more regular use of this for muscular pain  Continue oxycodone 10 mg and 15 mg every 4 hours as needed for moderate and severe pain, respectively  Discussed possible opioid rotation to oral Dilaudid tomorrow if patient's pain remains inconsistently managed on current regimen  He does require higher doses of opioids given chronic home opioid and benzo use  Continue IV Dilaudid 0.5 mg every 2 hours as needed for breakthrough pain  Discussed plan to wean tomorrow  Continue robust bowel regimen to avoid opioid-induced constipation  As needed Narcan added to reverse respiratory depression secondary to opioid use

## 2025-02-05 NOTE — ASSESSMENT & PLAN NOTE
Acute comminuted fracture of right C5 articular pillar.  Acute displaced fracture of right C6 transverse process, and nondisplaced fracture of left vertebral body with extension into left transverse foramen.   -- Repeat CT head, results pending  -- Upright x-rays obtained, stable in appearance  -- Patient evaluated by neurosurgery, who is actively following  -- At this time patient is refused operative management  -- Neurosurgery to sign off and follow with the patient on outpatient basis  -- Recommendation for c-collar to remain in place + spinal precautions  -- Recommendation for follow-up CTA in x 1 week as an outpatient  -- Recommendation for MRI cervical spine at x 2 weeks  -- Regular neurochecks  -- APS consult placed for pain control   -Discussed using Valium, patient denied opioid rotation to p.o. Dilaudid, plan to decrease IV Dilaudid tomorrow and readdress    -- Multimodal pain and nausea control  -- Hold AC/AP for x 2 weeks due to cervical epidural hematoma

## 2025-02-05 NOTE — PROGRESS NOTES
Progress Note - Acute Pain   Name: Peter Delarosa 69 y.o. male I MRN: [id number]  Unit/Bed#: [facility] 624-01 I Date of Admission: 2/3/2025   Date of Service: 2/5/2025 I Hospital Day: 2     Assessment & Plan  Closed fracture of cervical vertebra (HCC)  s/p snowboarding accident resulting in multiple cervical injuries including epidural hematoma causing severe spinal stenosis, ligamentous injury, C5-6 fracture  Has been evaluated by neurosurgery, will continue with conservative management for now but may require surgical intervention down the line (thus far patient adamantly declining surgery)  He continues to have sporadic pain in his shoulder blades and shoulder bilaterally, feels pain regimen works sometimes and other times note.  Did discuss utilizing valium for given more muscular nature of pain.  Also discussed opioid rotation to PO dilaudid however patient is on the fence.  Will keep regimen more or less the same and reconsider opioid rotation tomorrow with plan to decrease IV dilaudid tomorrow as well.     Regimen:  Ketamine discontinued 2/4/2025 secondary to mild hallucinations  Continue Tylenol 975 mg every 8 hours scheduled  Continue Cymbalta 30 mg daily  Continue gabapentin 100 mg 3 times daily  Increase lidocaine patches to 2 on bilateral shoulders, 12 hours on and 12 hours off  Continue Robaxin 750mg every 6 hours scheduled  Continue Valium 5 mg every 6 hours as needed for muscle spasm  Encouraged more regular use of this for muscular pain  Continue oxycodone 10 mg and 15 mg every 4 hours as needed for moderate and severe pain, respectively  Discussed possible opioid rotation to oral Dilaudid tomorrow if patient's pain remains inconsistently managed on current regimen  He does require higher doses of opioids given chronic home opioid and benzo use  Continue IV Dilaudid 0.5 mg every 2 hours as needed for breakthrough pain  Discussed plan to wean tomorrow  Continue robust bowel regimen to avoid  opioid-induced constipation  As needed Narcan added to reverse respiratory depression secondary to opioid use  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HCC)    - see plan above    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HCC)    - see plan above    IVH (intraventricular hemorrhage) (HCC)  Neurosurgery management    [facility] will continue to follow. Please contact Acute Pain Service - via SecureChat from [phone] with additional questions or concerns. See SecureChat or Amion for additional contacts and after hours information.     Subjective   Peter Delarosa is a 69 y.o. male who presents after snowboarding accident resulting in cervical epidural hematoma, vertebral artery dissection and closed fracture of the cervical vertebrae.  APS was consulted for posttraumatic pain.    Pain History  Current pain location(s):  Pain Score: 10  Pain Location/Orientation: Orientation: Bilateral, Location: Neck  Pain Scale: Pain Assessment Tool: 0-10  24 hour history: Patient is laying in bed comfortably, collar in place.  He states he is having a lot of of neck, shoulder and shoulder blade pain without radiation of pain into his upper extremities.  Denies any numbness or tingling or weakness in the arms or legs.  Has been having regular bowel movements and urinating per baseline.  He states pain regimen is inconsistent.  He will take the Robaxin and have on and off pain relief for a few minutes at a time.  The oxycodone usually takes about an hour to kick in and will maybe last him 2 hours.  He has not yet found any combination of medications provide him with lasting pain relief.  He did not feel any significant pain relief with the ketamine and secondary to hallucinations he did ask for this to be turned off yesterday.  We did discuss utilizing Valium more often secondary to pain feeling more climbing, pulling, spasmy in nature.  We also discussed opioid rotation although patient seems hesitant to do this.  I did discuss that I would like  to wean him off of IV medications and he is aware that he is unable to take those while at home.    Opioid requirement previous 24 hours: 75mg PO oxycodone, 2mg IV dilaudid  Meds/Allergies   all current active meds have been reviewed, current meds:   Current Facility-Administered Medications:     acetaminophen (TYLENOL) tablet 975 mg, Q8H NOE    bisacodyl (DULCOLAX) rectal suppository 10 mg, Daily PRN    diazepam (VALIUM) tablet 5 mg, Q6H PRN    DULoxetine (CYMBALTA) delayed release capsule 30 mg, Daily    gabapentin (NEURONTIN) capsule 100 mg, TID    HYDROmorphone (DILAUDID) injection 0.5 mg, Q2H PRN    lidocaine (LIDODERM) 5 % patch 1 patch, Daily    methocarbamol (ROBAXIN) tablet 750 mg, Q6H NOE    naloxone (NARCAN) injection 0.1 mg, Q1MIN PRN    ondansetron (ZOFRAN) injection 4 mg, Q4H PRN    oxyCODONE (ROXICODONE) immediate release tablet 10 mg, Q4H PRN **OR** oxyCODONE (ROXICODONE) IR tablet 15 mg, Q4H PRN    pantoprazole (PROTONIX) EC tablet 40 mg, Early Morning    senna-docusate sodium (SENOKOT S) 8.6-50 mg per tablet 2 tablet, BID, and PTA meds:   Prior to Admission Medications   Prescriptions Last Dose Informant Patient Reported? Taking?   HYDROcodone-acetaminophen (NORCO) 5-325 mg per tablet   Yes No   Sig: Take 1 tablet by mouth every 6 (six) hours as needed for pain      Facility-Administered Medications Last Administration Doses Remaining   Sodium Hyaluronate 20 mg 7/31/2020 12:12 PM    Sodium Hyaluronate 20 mg 7/31/2020 12:12 PM         Allergies   Allergen Reactions    Tetracyclines & Related      Objective :  Temp:  [98 °F (36.7 °C)-99.2 °F (37.3 °C)] 99 °F (37.2 °C)  HR:  [56-76] 59  BP: (121-159)/(69-87) 123/71  Resp:  [10-26] 16  SpO2:  [93 %-98 %] 96 %  O2 Device: None (Room air)    Physical Exam  Constitutional:       General: He is awake.      Appearance: He is well-developed.      Interventions: Cervical collar in place.   HENT:      Head: Normocephalic and atraumatic.   Eyes:       Conjunctiva/sclera: Conjunctivae normal.   Neck:      Comments: Collar in place  Cardiovascular:      Rate and Rhythm: Normal rate.   Pulmonary:      Effort: Pulmonary effort is normal. No respiratory distress.   Abdominal:      General: Bowel sounds are normal.      Palpations: Abdomen is soft.   Musculoskeletal:         General: Normal range of motion.      Cervical back: Normal range of motion. Tenderness present. Spinous process tenderness present.   Skin:     General: Skin is warm and dry.   Neurological:      Mental Status: He is alert and oriented to person, place, and time.      Motor: No weakness.      Comments: Moving all extremities without focal weakness   Psychiatric:         Attention and Perception: Attention and perception normal.         Mood and Affect: Mood and affect normal.         Speech: Speech normal.         Behavior: Behavior normal. Behavior is cooperative.         Thought Content: Thought content normal.         Cognition and Memory: Cognition and memory normal.         Judgment: Judgment normal.            Lab Results: I have reviewed the following results:  Estimated Creatinine Clearance: 72.7 mL/min (by C-G formula based on SCr of 1.1 mg/dL).  Lab Results   Component Value Date    WBC 5.53 02/05/2025    HGB 13.1 02/05/2025    HCT 41.0 02/05/2025     (L) 02/05/2025         Component Value Date/Time    K 4.1 02/05/2025 0548    K 4.1 04/22/2024 0930     02/05/2025 0548     04/22/2024 0930    CO2 24 02/05/2025 0548    CO2 31 04/22/2024 0930    BUN 15 02/05/2025 0548    BUN 16 04/22/2024 0930    CREATININE 1.10 02/05/2025 0548    CREATININE 1.18 04/22/2024 0930         Component Value Date/Time    CALCIUM 8.4 02/05/2025 0548    CALCIUM 9.9 04/22/2024 0930    ALKPHOS 55 02/03/2025 1146    ALKPHOS 59 04/22/2024 0930    AST 41 (H) 02/03/2025 1146    AST 29 04/22/2024 0930    ALT 13 02/03/2025 1146    ALT 12 04/22/2024 0930    TP 6.6 02/03/2025 1146    TP 6.9 04/22/2024 0930     ALB 4.7 02/03/2025 1146    ALB 5 04/22/2024 0930       Imaging Results Review: I personally reviewed the following image studies/reports in PACS and discussed pertinent findings with Radiology: MRI spine. My interpretation of the radiology images/reports is: as noted above.  Other Study Results Review: No additional pertinent studies reviewed.

## 2025-02-05 NOTE — ASSESSMENT & PLAN NOTE
- Repeat CT head results pending  - Neurosurgery following, signed off at this time  - Plan for interval reassessment at x 1 week follow-up  - Regular neurochecks  - Basic labs  - Stat CT head for acute drop in GCS

## 2025-02-05 NOTE — TELEPHONE ENCOUNTER
2/5/25 - PT IN [facility]  2/24/25 SNPX W/MO & PSARROS W/MRI CSPINE & CTA HEAD/NECK     [facility]  Hel,    Patient needs follow up in 2 weeks as joint appointment with Nallely and Moses. He will have MRI cervical spine and CTA head/neck. Thanks.

## 2025-02-05 NOTE — TREATMENT PLAN
RN states patient's pain remains intractable despite utilizing all ordered medications.  Will switch to oral dilaudid 4mg and 6mg for moderate and severe pain.  Everything else to remain the same.  Will continue to follow closely. RN and trauma team aware of the plan.

## 2025-02-05 NOTE — PROGRESS NOTES
Progress Note - Trauma   Name: Peter Delarosa 69 y.o. male I MRN: [id number]  Unit/Bed#: [facility] 624-01 I Date of Admission: 2/3/2025   Date of Service: 2/5/2025 I Hospital Day: 2    Assessment & Plan  Closed fracture of cervical vertebra (HCC)  Acute comminuted fracture of right C5 articular pillar.  Acute displaced fracture of right C6 transverse process, and nondisplaced fracture of left vertebral body with extension into left transverse foramen.   -- Repeat CT head, results pending  -- Upright x-rays obtained, stable in appearance  -- Patient evaluated by neurosurgery, who is actively following  -- At this time patient is refused operative management  -- Neurosurgery to sign off and follow with the patient on outpatient basis  -- Recommendation for c-collar to remain in place + spinal precautions  -- Recommendation for follow-up CTA in x 1 week as an outpatient  -- Recommendation for MRI cervical spine at x 2 weeks  -- Regular neurochecks  -- APS consult placed for pain control   -Discussed using Valium, patient denied opioid rotation to p.o. Dilaudid, plan to decrease IV Dilaudid tomorrow and readdress    -- Multimodal pain and nausea control  -- Hold AC/AP for x 2 weeks due to cervical epidural hematoma  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HCC)  Cervical spinal ventral epidural hematoma spans from at least C3-C7, worse at C5-C6 where there is circumferential involvement and suspected severe canal stenosis.     -See above      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HCC)  Occluded right vertebral artery V2 segment (proximally and distally) and V3 segment with multifocal areas of stenotic reconstituted flow, likely due to traumatic dissection injury.     -See above  IVH (intraventricular hemorrhage) (HCC)  - Repeat CT head results pending  - Neurosurgery following, signed off at this time  - Plan for interval reassessment at x 1 week follow-up  - Regular neurochecks  - Basic labs  - Stat CT head for acute drop in  GCS    Bowel Regimen: Senokot  VTE Prophylaxis:Reason for no pharmacologic prophylaxis BCVI, recommendation per neurosurgery, no pharmacologic dvt ppx at this time      Disposition: dc pending improved pain control     24 Hour Events : No acute overnight events  Subjective : Patient reports that at different intervals during the evening he has better and worse episodes of pain control with his existing as needed medications and scheduled pain medications, otherwise he is feeling well, no active complaints aside from neck and back pain    Objective :  Temp:  [97.8 °F (36.6 °C)-99.2 °F (37.3 °C)] 97.8 °F (36.6 °C)  HR:  [59-76] 65  BP: (121-159)/(69-80) 143/78  Resp:  [16-18] 16  SpO2:  [93 %-96 %] 96 %  O2 Device: None (Room air)    I/O         02/03 0701  02/04 0700 02/04 0701  02/05 0700 02/05 0701  02/06 0700    P.O.  1440     I.V. (mL/kg) 1256.3 (15.3) 1128.9 (13.7)     Total Intake(mL/kg) 1256.3 (15.3) 2568.9 (31.1)     Urine (mL/kg/hr) 800 700 (0.4)     Stool  0     Total Output 800 700     Net +456.3 +1868.9            Unmeasured Stool Occurrence  1 x             Physical Exam  Vitals and nursing note reviewed.   Constitutional:       General: He is not in acute distress.     Appearance: He is well-developed. He is not ill-appearing or toxic-appearing.   HENT:      Head: Normocephalic and atraumatic.      Comments: C-collar in place     Mouth/Throat:      Pharynx: No oropharyngeal exudate or posterior oropharyngeal erythema.   Eyes:      Conjunctiva/sclera: Conjunctivae normal.   Cardiovascular:      Rate and Rhythm: Normal rate and regular rhythm.      Heart sounds: No murmur heard.  Pulmonary:      Effort: Pulmonary effort is normal. No respiratory distress.      Breath sounds: Normal breath sounds. No wheezing, rhonchi or rales.   Abdominal:      Palpations: Abdomen is soft.      Tenderness: There is no abdominal tenderness. There is no guarding or rebound.   Musculoskeletal:         General: No swelling,  deformity or signs of injury.      Cervical back: Neck supple.   Skin:     General: Skin is warm and dry.      Capillary Refill: Capillary refill takes less than 2 seconds.      Coloration: Skin is not jaundiced or pale.   Neurological:      Mental Status: He is alert.   Psychiatric:         Mood and Affect: Mood normal.               Lab Results: I have reviewed the following results:  Recent Labs     02/03/25  1146 02/04/25  0453 02/05/25  0548   WBC  --  6.50 5.53   HGB  --  14.1 13.1   HCT  --  42.2 41.0   PLT  --  147* 134*   SODIUM 138 138 137   K 3.9 3.8 4.1    104 105   CO2 26 28 24   BUN 18 18 15   CREATININE 1.28 1.22 1.10   GLUC 101 90 120   MG  --  2.0  --    PHOS  --  3.1  --    AST 41*  --   --    ALT 13  --   --    ALB 4.7  --   --    TBILI 0.83  --   --    ALKPHOS 55  --   --    PTT 29  --   --    INR 1.01  --   --

## 2025-02-05 NOTE — PLAN OF CARE
Problem: PAIN - ADULT  Goal: Verbalizes/displays adequate comfort level or baseline comfort level  Description: Interventions:  - Encourage patient to monitor pain and request assistance  - Assess pain using appropriate pain scale  - Administer analgesics based on type and severity of pain and evaluate response  - Implement non-pharmacological measures as appropriate and evaluate response  - Consider cultural and social influences on pain and pain management  - Notify physician/advanced practitioner if interventions unsuccessful or patient reports new pain  Outcome: Progressing     Problem: INFECTION - ADULT  Goal: Absence or prevention of progression during hospitalization  Description: INTERVENTIONS:  - Assess and monitor for signs and symptoms of infection  - Monitor lab/diagnostic results  - Monitor all insertion sites, i.e. indwelling lines, tubes, and drains  - Monitor endotracheal if appropriate and nasal secretions for changes in amount and color  - [place] appropriate cooling/warming therapies per order  - Administer medications as ordered  - Instruct and encourage patient and family to use good hand hygiene technique  - Identify and instruct in appropriate isolation precautions for identified infection/condition  Outcome: Progressing  Goal: Absence of fever/infection during neutropenic period  Description: INTERVENTIONS:  - Monitor WBC    Outcome: Progressing     Problem: SAFETY ADULT  Goal: Patient will remain free of falls  Description: INTERVENTIONS:  - Educate patient/family on patient safety including physical limitations  - Instruct patient to call for assistance with activity   - Consult OT/PT to assist with strengthening/mobility   - Keep Call bell within reach  - Keep bed low and locked with side rails adjusted as appropriate  - Keep care items and personal belongings within reach  - Initiate and maintain comfort rounds  - Make Fall Risk Sign visible to staff  - Offer Toileting every 2 Hours,  in advance of need  - Initiate/Maintain alarm  - Obtain necessary fall risk management equipment:   - Apply yellow socks and bracelet for high fall risk patients  - Consider moving patient to room near nurses station  Outcome: Progressing  Goal: Maintain or return to baseline ADL function  Description: INTERVENTIONS:  -  Assess patient's ability to carry out ADLs; assess patient's baseline for ADL function and identify physical deficits which impact ability to perform ADLs (bathing, care of mouth/teeth, toileting, grooming, dressing, etc.)  - Assess/evaluate cause of self-care deficits   - Assess range of motion  - Assess patient's mobility; develop plan if impaired  - Assess patient's need for assistive devices and provide as appropriate  - Encourage maximum independence but intervene and supervise when necessary  - Involve family in performance of ADLs  - Assess for home care needs following discharge   - Consider OT consult to assist with ADL evaluation and planning for discharge  - Provide patient education as appropriate  Outcome: Progressing  Goal: Maintains/Returns to pre admission functional level  Description: INTERVENTIONS:  - Perform AM-PAC 6 Click Basic Mobility/ Daily Activity assessment daily.  - Set and communicate daily mobility goal to care team and patient/family/caregiver.   - Collaborate with rehabilitation services on mobility goals if consulted  - Perform Range of Motion 3 times a day.  - Reposition patient every 2 hours.  - Dangle patient 3 times a day  - Stand patient 3 times a day  - Ambulate patient 3 times a day  - Out of bed to chair 3 times a day   - Out of bed for meals 3 times a day  - Out of bed for toileting  - Record patient progress and toleration of activity level   Outcome: Progressing     Problem: DISCHARGE PLANNING  Goal: Discharge to home or other facility with appropriate resources  Description: INTERVENTIONS:  - Identify barriers to discharge w/patient and caregiver  -  Arrange for needed discharge resources and transportation as appropriate  - Identify discharge learning needs (meds, wound care, etc.)  - Arrange for interpretive services to assist at discharge as needed  - Refer to Case Management Department for coordinating discharge planning if the patient needs post-hospital services based on physician/advanced practitioner order or complex needs related to functional status, cognitive ability, or social support system  Outcome: Progressing     Problem: Knowledge Deficit  Goal: Patient/family/caregiver demonstrates understanding of disease process, treatment plan, medications, and discharge instructions  Description: Complete learning assessment and assess knowledge base.  Interventions:  - Provide teaching at level of understanding  - Provide teaching via preferred learning methods  Outcome: Progressing     Problem: Prexisting or High Potential for Compromised Skin Integrity  Goal: Skin integrity is maintained or improved  Description: INTERVENTIONS:  - Identify patients at risk for skin breakdown  - Assess and monitor skin integrity  - Assess and monitor nutrition and hydration status  - Monitor labs   - Assess for incontinence   - Turn and reposition patient  - Assist with mobility/ambulation  - Relieve pressure over bony prominences  - Avoid friction and shearing  - Provide appropriate hygiene as needed including keeping skin clean and dry  - Evaluate need for skin moisturizer/barrier cream  - Collaborate with interdisciplinary team   - Patient/family teaching  - Consider wound care consult   Outcome: Progressing

## 2025-02-05 NOTE — PHYSICAL THERAPY NOTE
Physical Therapy Progress Note     02/05/25 1415   PT Last Visit   PT Visit Date 02/05/25   Note Type   Note Type Treatment   Pain Assessment   Pain Assessment Tool FLACC   Pain Rating: FLACC (Rest) - Face 1   Pain Rating: FLACC (Rest) - Legs 0   Pain Rating: FLACC (Rest) - Activity 1   Pain Rating: FLACC (Rest) - Cry 1   Pain Rating: FLACC (Rest) - Consolability 0   Score: FLACC (Rest) 3   Pain Rating: FLACC (Activity) - Face 2   Pain Rating: FLACC (Activity) - Legs 1   Pain Rating: FLACC (Activity) - Activity 1   Pain Rating: FLACC (Activity) - Cry 1   Pain Rating: FLACC (Activity) - Consolability 0   Score: FLACC (Activity) 5   Restrictions/Precautions   Braces or Orthoses C/S Collar   Other Precautions Spinal precautions;Fall Risk;Pain;Hard of hearing   Subjective   Subjective Pt encountered supine in bed, pleasant and agreeable to treatment.  Reports improving pain control since yesterday & is motivated to get better to resume active lifestyle.  Reports radiating pain primarily in shoulders anterior & posterior at rest & with activity.  slightly relieved with offloading weight of UEs with pillows upon return to room.   Bed Mobility   Supine to Sit 5  Supervision   Additional items Assist x 1   Sit to Supine 5  Supervision   Additional items Assist x 1   Transfers   Sit to Stand 5  Supervision   Additional items Assist x 1   Stand to Sit 5  Supervision   Additional items Assist x 1   Ambulation/Elevation   Gait pattern Excessively slow;Short stride;Inconsistent trin[place];Decreased foot clearance;Improper Weight shift   Gait Assistance 4  Minimal assist  (contact guard)   Additional items Assist x 1   Assistive Device Rolling walker   Distance 300' with standing rests to converse   Balance   Static Sitting Fair +   Static Standing Fair   Ambulatory Fair -   Activity Tolerance   Activity Tolerance Patient tolerated treatment well;Patient limited by fatigue;Patient limited by pain   Nurse Made Aware yes   Assessment    Prognosis Fair   Problem List Decreased strength;Decreased range of motion;Decreased endurance;Impaired balance;Decreased mobility;Decreased coordination;Decreased cognition;Impaired judgement;Decreased safety awareness;Pain   Assessment pt demonstrated improved funcitonal mobility & endurance today, mabulating community distances with use of RW & no LOB despite pain & generalized fatigue & weakness as result of totality of injuries he has sustained.  He perfomred all tasks without LOB  while managing RW, and will likely progress away from AD quickly pending improvement in his overall subjective recovery.  Upon return to room, discussion held regarding importance of mobiilty upon return home while avoiding strenuous tasks to allow for appropriate haling.  Education provided regarding cervical spinal precautions & collar management.  hands on practice with collar change deferred today due to pain at this time.  he may benefit from reinforcement of the same with staff either in upcoming PT/OT session or with assist from restorative staff.  Pt encouraged to ambulate with staff during hosptial stay to maximize independence & endurance prior to returning home.  All questions answered to pt's satisfactionat this time.  PT POC & d/c recommendations remain appropriate.   Goals   Patient Goals to have less pain   STG Expiration Date 02/16/25   PT Treatment Day 1   Plan   Treatment/Interventions Functional transfer training;LE strengthening/ROM;Elevations;Therapeutic exercise;Endurance training;Patient/family training;Equipment eval/education;Bed mobility;Gait training   PT Frequency 3-5x/wk   Discharge Recommendation   Rehab Resource Intensity Level, PT III (Minimum Resource Intensity)   Equipment Recommended Walker   Walker Package Recommended Wheeled walker  (at this time, may progress to no AD prior to d/c)   AM-PAC Basic Mobility Inpatient   Turning in Flat Bed Without Bedrails 3   Lying on Back to Sitting on Edge of  Flat Bed Without Bedrails 3   Moving Bed to Chair 3   Standing Up From Chair Using Arms 3   Walk in Room 3   Climb 3-5 Stairs With Railing 3   Basic Mobility Inpatient Raw Score 18   Basic Mobility Standardized Score 41.05   [facility] Highest Level Of Mobility   -HLM Goal 6: Walk 10 steps or more   -HLM Achieved 8: Walk 250 feet ot more     Thanh Simmons PTA    An [facility] Basic Mobility Raw Score less than 17 suggests pt would benefit from post acute rehab.  Please also refer to the recommendation of the Physical Therapist for safe discharge planning.
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
## 2025-02-05 NOTE — PLAN OF CARE
Problem: PAIN - ADULT  Goal: Verbalizes/displays adequate comfort level or baseline comfort level  Description: Interventions:  - Encourage patient to monitor pain and request assistance  - Assess pain using appropriate pain scale  - Administer analgesics based on type and severity of pain and evaluate response  - Implement non-pharmacological measures as appropriate and evaluate response  - Consider cultural and social influences on pain and pain management  - Notify physician/advanced practitioner if interventions unsuccessful or patient reports new pain  Outcome: Progressing     Problem: INFECTION - ADULT  Goal: Absence or prevention of progression during hospitalization  Description: INTERVENTIONS:  - Assess and monitor for signs and symptoms of infection  - Monitor lab/diagnostic results  - Monitor all insertion sites, i.e. indwelling lines, tubes, and drains  - Monitor endotracheal if appropriate and nasal secretions for changes in amount and color  - Nelson appropriate cooling/warming therapies per order  - Administer medications as ordered  - Instruct and encourage patient and family to use good hand hygiene technique  - Identify and instruct in appropriate isolation precautions for identified infection/condition  Outcome: Progressing  Goal: Absence of fever/infection during neutropenic period  Description: INTERVENTIONS:  - Monitor WBC    Outcome: Progressing     Problem: SAFETY ADULT  Goal: Patient will remain free of falls  Description: INTERVENTIONS:  - Educate patient/family on patient safety including physical limitations  - Instruct patient to call for assistance with activity   - Consult OT/PT to assist with strengthening/mobility   - Keep Call bell within reach  - Keep bed low and locked with side rails adjusted as appropriate  - Keep care items and personal belongings within reach  - Initiate and maintain comfort rounds  - Make Fall Risk Sign visible to staff  - Offer Toileting every  Hours,  in advance of need  - Initiate/Maintain alarm  - Obtain necessary fall risk management equipment:   - Apply yellow socks and bracelet for high fall risk patients  - Consider moving patient to room near nurses station  Outcome: Progressing  Goal: Maintain or return to baseline ADL function  Description: INTERVENTIONS:  -  Assess patient's ability to carry out ADLs; assess patient's baseline for ADL function and identify physical deficits which impact ability to perform ADLs (bathing, care of mouth/teeth, toileting, grooming, dressing, etc.)  - Assess/evaluate cause of self-care deficits   - Assess range of motion  - Assess patient's mobility; develop plan if impaired  - Assess patient's need for assistive devices and provide as appropriate  - Encourage maximum independence but intervene and supervise when necessary  - Involve family in performance of ADLs  - Assess for home care needs following discharge   - Consider OT consult to assist with ADL evaluation and planning for discharge  - Provide patient education as appropriate  Outcome: Progressing  Goal: Maintains/Returns to pre admission functional level  Description: INTERVENTIONS:  - Perform AM-PAC 6 Click Basic Mobility/ Daily Activity assessment daily.  - Set and communicate daily mobility goal to care team and patient/family/caregiver.   - Collaborate with rehabilitation services on mobility goals if consulted  - Perform Range of Motion  times a day.  - Reposition patient every  hours.  - Dangle patient  times a day  - Stand patient  times a day  - Ambulate patient  times a day  - Out of bed to chair  times a day   - Out of bed for meals  times a day  - Out of bed for toileting  - Record patient progress and toleration of activity level   Outcome: Progressing     Problem: DISCHARGE PLANNING  Goal: Discharge to home or other facility with appropriate resources  Description: INTERVENTIONS:  - Identify barriers to discharge w/patient and caregiver  - Arrange for  needed discharge resources and transportation as appropriate  - Identify discharge learning needs (meds, wound care, etc.)  - Arrange for interpretive services to assist at discharge as needed  - Refer to Case Management Department for coordinating discharge planning if the patient needs post-hospital services based on physician/advanced practitioner order or complex needs related to functional status, cognitive ability, or social support system  Outcome: Progressing     Problem: Knowledge Deficit  Goal: Patient/family/caregiver demonstrates understanding of disease process, treatment plan, medications, and discharge instructions  Description: Complete learning assessment and assess knowledge base.  Interventions:  - Provide teaching at level of understanding  - Provide teaching via preferred learning methods  Outcome: Progressing

## 2025-02-05 NOTE — DISCHARGE INSTR - AVS FIRST PAGE
Neurosurgery discharge instructions following neck fracture:     VISTA collar at all times except for showering change to diann (peach) collar.  No bending, twisting or heavy lifting. No pushing or pulling over 10lbs. No strenuous activities. NO DRIVING.     **Please notify MD immediately if you have increased neck or arm pain. New numbness and/or weakness in your arm. Difficulty swallowing or breathing especially while lying down. Numbness or weakness in arms or legs. Increased difficulty walking **     Neurosurgery discharge instructions following traumatic head bleed:     Do not take any blood thinning medications (ie. No Advil. No motrin. No ibuprofen. No Aleve. No Aspirin. No fishoil. No heparin. No antiplatelet / no anticoagulation medication).  Refrain from activity that increases chance of trauma to head or falls. Recommend you take fall precaution.  No strenuous activity or sports.  Return to hospital Emergency Room if you experience worsening / new headache, nausea/vomiting, speech/vision change, seizure, confusion / mental status change, weakness, or other neurological changes.      Follow-up as scheduled with a repeat CT head without contrast to be completed 2-3 days prior to visit.  Prescription has been entered electronically.  Please call [phone] to schedule.       Acute pain service discharge instructions:    Do not take previously prescribed Percocet or alprazolam until follow-up with transitional pain clinic provider.  Taking previously prescribed Percocet or alprazolam while taking newly prescribed Valium and/or Dilaudid could lead to severe consequences including respiratory depression, respiratory arrest and death.    Follow-up with transitional pain clinic either on 2/11/2025 or, if unavailable that day, as soon as possible following return from traveling.    As needed Dilaudid was written in 2 separate prescriptions, the first a taper over several days and the second a continuation of  low-dose oral Dilaudid with a do not fill date following the first Dilaudid prescription completion.    Prescription provided for intranasal Narcan in the event that respiratory depression or decreased mental status were to occur.    Trauma Discharge Instructions:    Please follow-up as instructed. If you need a follow-up appointment, please call the office when you leave to schedule an appointment.    Activity:  - PT and OT evaluation and treatment as indicated.  - Walking and normal light activities are encouraged.  - Normal daily activities including climbing steps are okay.  - No driving until no longer using pain medications.    Return to work:    - You may return to work once cleared by the Neurosurgical service.    Diet:    - You may resume your normal diet.    Medications:  - You should continue your current medication regimen after discharge unless otherwise instructed. Please refer to your discharge medication list for further details.  - Please take the pain medications as directed.  - You are encouraged to use non-narcotic pain medications first and whenever possible. Reserve the use of narcotic pain medication for moderate to severe pain not controlled by non-narcotic medications.  - No driving while taking narcotic pain medications.  - You may become constipated, especially if taking pain medications. You may take any over the counter stool softeners or laxatives as needed. Examples: Milk of Magnesia, Colace, Senna.    Additional Instructions:  - May shower daily.  - If you have any questions or concerns after discharge please call the office.  - Call office or return to ER if fever greater than 101, chills, persistent nausea/vomiting, worsening/uncontrollable pain, develop productive cough, increasing shortness of breath, difficulty breathing, and/or increasing redness or purulent/foul smelling drainage from incision(s).
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
## 2025-02-06 LAB
ANION GAP SERPL CALCULATED.3IONS-SCNC: 5 MMOL/L (ref 4–13)
BUN SERPL-MCNC: 14 MG/DL (ref 5–25)
CALCIUM SERPL-MCNC: 8.8 MG/DL (ref 8.4–10.2)
CHLORIDE SERPL-SCNC: 103 MMOL/L (ref 96–108)
CO2 SERPL-SCNC: 30 MMOL/L (ref 21–32)
CREAT SERPL-MCNC: 0.9 MG/DL (ref 0.6–1.3)
ERYTHROCYTE [DISTWIDTH] IN BLOOD BY AUTOMATED COUNT: 12.2 % (ref 11.6–15.1)
GFR SERPL CREATININE-BSD FRML MDRD: 86 ML/MIN/1.73SQ M
GLUCOSE SERPL-MCNC: 110 MG/DL (ref 65–140)
HCT VFR BLD AUTO: 38.2 % (ref 36.5–49.3)
HGB BLD-MCNC: 12.7 G/DL (ref 12–17)
MCH RBC QN AUTO: 29.4 PG (ref 26.8–34.3)
MCHC RBC AUTO-ENTMCNC: 33.2 G/DL (ref 31.4–37.4)
MCV RBC AUTO: 88 FL (ref 82–98)
PLATELET # BLD AUTO: 133 THOUSANDS/UL (ref 149–390)
PMV BLD AUTO: 10.9 FL (ref 8.9–12.7)
POTASSIUM SERPL-SCNC: 4 MMOL/L (ref 3.5–5.3)
RBC # BLD AUTO: 4.32 MILLION/UL (ref 3.88–5.62)
SODIUM SERPL-SCNC: 138 MMOL/L (ref 135–147)
WBC # BLD AUTO: 4.9 THOUSAND/UL (ref 4.31–10.16)

## 2025-02-06 PROCEDURE — 99232 SBSQ HOSP IP/OBS MODERATE 35: CPT | Performed by: SURGERY

## 2025-02-06 PROCEDURE — 80048 BASIC METABOLIC PNL TOTAL CA: CPT

## 2025-02-06 PROCEDURE — 99233 SBSQ HOSP IP/OBS HIGH 50: CPT | Performed by: PHYSICIAN ASSISTANT

## 2025-02-06 PROCEDURE — 90677 PCV20 VACCINE IM: CPT | Performed by: SURGERY

## 2025-02-06 PROCEDURE — G0009 ADMIN PNEUMOCOCCAL VACCINE: HCPCS | Performed by: SURGERY

## 2025-02-06 PROCEDURE — 85027 COMPLETE CBC AUTOMATED: CPT

## 2025-02-06 RX ORDER — LIDOCAINE 50 MG/G
2 PATCH TOPICAL DAILY
Qty: 60 PATCH | Refills: 0 | Status: SHIPPED | OUTPATIENT
Start: 2025-02-07 | End: 2025-02-07

## 2025-02-06 RX ORDER — GABAPENTIN 100 MG/1
100 CAPSULE ORAL 3 TIMES DAILY
Qty: 90 CAPSULE | Refills: 0 | Status: SHIPPED | OUTPATIENT
Start: 2025-02-06 | End: 2025-02-07

## 2025-02-06 RX ORDER — HYDROMORPHONE HYDROCHLORIDE 2 MG/1
2 TABLET ORAL EVERY 4 HOURS PRN
Qty: 48 TABLET | Refills: 0 | Status: SHIPPED | OUTPATIENT
Start: 2025-02-11 | End: 2025-02-07

## 2025-02-06 RX ORDER — HYDROMORPHONE HYDROCHLORIDE 2 MG/1
TABLET ORAL
Qty: 90 TABLET | Refills: 0 | Status: SHIPPED | OUTPATIENT
Start: 2025-02-06 | End: 2025-02-07

## 2025-02-06 RX ORDER — DULOXETIN HYDROCHLORIDE 30 MG/1
30 CAPSULE, DELAYED RELEASE ORAL DAILY
Qty: 30 CAPSULE | Refills: 0 | Status: SHIPPED | OUTPATIENT
Start: 2025-02-07 | End: 2025-02-07

## 2025-02-06 RX ORDER — METHOCARBAMOL 750 MG/1
TABLET, FILM COATED ORAL
Qty: 44 TABLET | Refills: 0 | Status: SHIPPED | OUTPATIENT
Start: 2025-02-06 | End: 2025-02-07

## 2025-02-06 RX ORDER — DIAZEPAM 5 MG/1
TABLET ORAL
Qty: 18 TABLET | Refills: 0 | Status: SHIPPED | OUTPATIENT
Start: 2025-02-06 | End: 2025-02-07

## 2025-02-06 RX ADMIN — HYDROMORPHONE HYDROCHLORIDE 0.5 MG: 1 INJECTION, SOLUTION INTRAMUSCULAR; INTRAVENOUS; SUBCUTANEOUS at 21:03

## 2025-02-06 RX ADMIN — HYDROMORPHONE HYDROCHLORIDE 6 MG: 2 TABLET ORAL at 20:01

## 2025-02-06 RX ADMIN — HYDROMORPHONE HYDROCHLORIDE 0.5 MG: 1 INJECTION, SOLUTION INTRAMUSCULAR; INTRAVENOUS; SUBCUTANEOUS at 12:59

## 2025-02-06 RX ADMIN — DIAZEPAM 5 MG: 5 TABLET ORAL at 02:05

## 2025-02-06 RX ADMIN — ACETAMINOPHEN 975 MG: 325 TABLET, FILM COATED ORAL at 13:00

## 2025-02-06 RX ADMIN — PANTOPRAZOLE SODIUM 40 MG: 40 TABLET, DELAYED RELEASE ORAL at 05:59

## 2025-02-06 RX ADMIN — METHOCARBAMOL 750 MG: 750 TABLET ORAL at 23:36

## 2025-02-06 RX ADMIN — METHOCARBAMOL 750 MG: 750 TABLET ORAL at 12:01

## 2025-02-06 RX ADMIN — PNEUMOCOCCAL 20-VALENT CONJUGATE VACCINE 0.5 ML
2.2; 2.2; 2.2; 2.2; 2.2; 2.2; 2.2; 2.2; 2.2; 2.2; 2.2; 2.2; 2.2; 2.2; 2.2; 2.2; 4.4; 2.2; 2.2; 2.2 INJECTION, SUSPENSION INTRAMUSCULAR at 12:16

## 2025-02-06 RX ADMIN — HYDROMORPHONE HYDROCHLORIDE 6 MG: 2 TABLET ORAL at 08:10

## 2025-02-06 RX ADMIN — DIAZEPAM 5 MG: 5 TABLET ORAL at 13:00

## 2025-02-06 RX ADMIN — HYDROMORPHONE HYDROCHLORIDE 0.5 MG: 1 INJECTION, SOLUTION INTRAMUSCULAR; INTRAVENOUS; SUBCUTANEOUS at 04:00

## 2025-02-06 RX ADMIN — HYDROMORPHONE HYDROCHLORIDE 6 MG: 2 TABLET ORAL at 15:50

## 2025-02-06 RX ADMIN — SENNOSIDES AND DOCUSATE SODIUM 2 TABLET: 50; 8.6 TABLET ORAL at 08:10

## 2025-02-06 RX ADMIN — GABAPENTIN 100 MG: 100 CAPSULE ORAL at 20:00

## 2025-02-06 RX ADMIN — METHOCARBAMOL 750 MG: 750 TABLET ORAL at 05:59

## 2025-02-06 RX ADMIN — GABAPENTIN 100 MG: 100 CAPSULE ORAL at 17:31

## 2025-02-06 RX ADMIN — HYDROMORPHONE HYDROCHLORIDE 6 MG: 2 TABLET ORAL at 00:40

## 2025-02-06 RX ADMIN — DIAZEPAM 5 MG: 5 TABLET ORAL at 22:15

## 2025-02-06 RX ADMIN — METHOCARBAMOL 750 MG: 750 TABLET ORAL at 17:31

## 2025-02-06 RX ADMIN — ACETAMINOPHEN 975 MG: 325 TABLET, FILM COATED ORAL at 05:59

## 2025-02-06 RX ADMIN — SENNOSIDES AND DOCUSATE SODIUM 2 TABLET: 50; 8.6 TABLET ORAL at 17:31

## 2025-02-06 RX ADMIN — ACETAMINOPHEN 975 MG: 325 TABLET, FILM COATED ORAL at 21:03

## 2025-02-06 RX ADMIN — LIDOCAINE 2 PATCH: 700 PATCH TOPICAL at 08:10

## 2025-02-06 RX ADMIN — HYDROMORPHONE HYDROCHLORIDE 0.5 MG: 1 INJECTION, SOLUTION INTRAMUSCULAR; INTRAVENOUS; SUBCUTANEOUS at 15:50

## 2025-02-06 RX ADMIN — GABAPENTIN 100 MG: 100 CAPSULE ORAL at 08:09

## 2025-02-06 RX ADMIN — DULOXETINE 30 MG: 30 CAPSULE, DELAYED RELEASE ORAL at 08:09

## 2025-02-06 RX ADMIN — HYDROMORPHONE HYDROCHLORIDE 0.5 MG: 1 INJECTION, SOLUTION INTRAMUSCULAR; INTRAVENOUS; SUBCUTANEOUS at 17:54

## 2025-02-06 RX ADMIN — HYDROMORPHONE HYDROCHLORIDE 6 MG: 2 TABLET ORAL at 12:01
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
## 2025-02-06 NOTE — PROGRESS NOTES
Progress Note - Acute Pain   Name: Peter Delarosa 69 y.o. male I MRN: [id number]  Unit/Bed#: [facility] 624-01 I Date of Admission: 2/3/2025   Date of Service: 2/6/2025 I Hospital Day: 3    Assessment & Plan  Closed fracture of cervical vertebra (HCC)  s/p snowboarding accident resulting in multiple cervical injuries including epidural hematoma causing severe spinal stenosis, ligamentous injury, C5-6 fracture  Has been evaluated by neurosurgery, will continue with conservative management for now but may require surgical intervention down the line (thus far patient adamantly declining surgery)  He continues to have sporadic pain in his shoulder blades and shoulder bilaterally, feels pain regimen works sometimes and other times note.  Did discuss utilizing valium for given more muscular nature of pain.  Also discussed opioid rotation to PO dilaudid however patient is on the fence.  Will keep regimen more or less the same and reconsider opioid rotation tomorrow with plan to decrease IV dilaudid tomorrow as well.     Regimen:  Ketamine discontinued 2/4/2025 secondary to mild hallucinations  Continue Tylenol 975 mg every 8 hours scheduled  Continue Cymbalta 30 mg daily  Continue gabapentin 100 mg 3 times daily  Continue lidocaine patches 2 on bilateral shoulders, 12 hours on and 12 hours off  Continue Robaxin 750mg every 6 hours scheduled  Continue Valium 5 mg every 6 hours as needed for muscle spasm  Encouraged more regular use of this for muscular pain  Continue Dilaudid 4 mg p.o. every 4 hours as needed moderate pain or 6 mg p.o. every 4 hours as needed severe pain.  Continue IV Dilaudid 0.5 mg every 2 hours as needed for breakthrough pain.  Patient encouraged to avoid IV opioids is much as possible to ensure that oral regimen is effective prior to discharge.  Continue robust bowel regimen to avoid opioid-induced constipation  As needed Narcan added to reverse respiratory depression secondary to opioid use    At  discharge, suggest the following:  Acetaminophen 975 mg p.o. every 8 hours as needed mild pain.  Duloxetine 30 mg p.o. daily.  Gabapentin 100 mg p.o. 3 times daily.  Lidocaine patch x 2, on for 12 hours and off for 12 hours.  Methocarbamol 750 mg p.o. every 6 hours scheduled x 3 days, then every 6 hours as needed muscle spasm  Diazepam 5 mg p.o. every 8 hours as needed muscle spasm x 3 days, then every 12 hours as needed muscle spasm x 3 days, then once daily as needed muscle spasms x 3 days.  Hydromorphone 6 mg p.o. every 4 hours as needed severe pain x 3 days, then 4 mg p.o. every 4 hours as needed severe pain x 3 days, then 2 mg p.o. every 4 hours as needed severe pain until reevaluated in transitional pain clinic.  Patient to follow-up in transitional pain clinic and will receive a call from clinic to schedule.  Patient is tentatively traveling for 1 week starting next Tuesday, 2/11/2025.  If patient does not travel, can follow-up on 2/11/2025 with transitional pain (either virtually or in person).  If patient does travel he will schedule for the first opportunity upon returning.  The above-noted taper of diazepam and hydromorphone are in anticipation that patient may or may not be able to follow-up with transitional pain clinic next week.  Patient is instructed not to take previously prescribed opioids or benzodiazepines until instructed to do so by transitional pain clinic provider.  Will prescribe intranasal Narcan in the event that opioid reversal becomes necessary.  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HCC)    - see plan above    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HCC)    - see plan above    IVH (intraventricular hemorrhage) (HCC)  Neurosurgery management    APS will continue to follow. Please contact Acute Pain Service - via SecureChat from [phone] with additional questions or concerns. See SecureChat or [facility] [phone] for additional contacts and after hours information.     Bonnie Delarosa is a  69 y.o. male who presents after snowboarding accident resulting in cervical epidural hematoma, vertebral artery dissection and closed fracture of the cervical vertebrae. APS was consulted for posttraumatic pain.     Pain History  Current pain location(s):  Pain Score: 7  Pain Location/Orientation: Location: Neck  Pain Scale: Pain Assessment Tool: 0-10  24 hour history: Patient states that they are without his date yesterday his pain was uncontrolled, however following a dose of IV Dilaudid after midnight his pain improved significantly and is extremely tolerable currently.  Patient was encountered walking around the unit without difficulty using a walker.  States that he feels very well today from a pain standpoint and will attempt to avoid further IV opioids as he feels he does not need them.    Opioid requirement previous 24 hours: Oxycodone 15 mg p.o. x 1, Dilaudid 6 mg p.o. x 4, Dilaudid 0.5 mg IV x 6.  Meds/Allergies   all current active meds have been reviewed, current meds:   Current Facility-Administered Medications:     acetaminophen (TYLENOL) tablet 975 mg, Q8H NOE    bisacodyl (DULCOLAX) rectal suppository 10 mg, Daily PRN    diazepam (VALIUM) tablet 5 mg, Q6H PRN    DULoxetine (CYMBALTA) delayed release capsule 30 mg, Daily    gabapentin (NEURONTIN) capsule 100 mg, TID    HYDROmorphone (DILAUDID) injection 0.5 mg, Q2H PRN    HYDROmorphone (DILAUDID) tablet 4 mg, Q4H PRN    HYDROmorphone (DILAUDID) tablet 6 mg, Q4H PRN    lidocaine (LIDODERM) 5 % patch 2 patch, Daily    methocarbamol (ROBAXIN) tablet 750 mg, Q6H NOE    naloxone (NARCAN) injection 0.1 mg, Q1MIN PRN    ondansetron (ZOFRAN) injection 4 mg, Q4H PRN    pantoprazole (PROTONIX) EC tablet 40 mg, Early Morning    pneumococcal 20-antonia conj vacc (PREVNAR 20) IM Injection 0.5 mL, Once    senna-docusate sodium (SENOKOT S) 8.6-50 mg per tablet 2 tablet, BID, and PTA meds:   Prior to Admission Medications   Prescriptions Last Dose Informant Patient  Reported? Taking?   HYDROcodone-acetaminophen (NORCO) 5-325 mg per tablet   Yes No   Sig: Take 1 tablet by mouth every 6 (six) hours as needed for pain      Facility-Administered Medications Last Administration Doses Remaining   Sodium Hyaluronate 20 mg 7/31/2020 12:12 PM    Sodium Hyaluronate 20 mg 7/31/2020 12:12 PM         Allergies   Allergen Reactions    Tetracyclines & Related      Objective :  Temp:  [97.2 °F (36.2 °C)-98.8 °F (37.1 °C)] 98.1 °F (36.7 °C)  HR:  [65-71] 70  BP: (133-169)/() 136/77  Resp:  [16-21] 16  SpO2:  [94 %-96 %] 96 %  O2 Device: None (Room air)    Physical Exam  Vitals and nursing note reviewed.   Constitutional:       General: He is awake. He is not in acute distress.     Appearance: He is not ill-appearing, toxic-appearing or diaphoretic.   Skin:     General: Skin is warm and dry.   Neurological:      Mental Status: He is alert and oriented to person, place, and time.      GCS: GCS eye subscore is 4. GCS verbal subscore is 5. GCS motor subscore is 6.   Psychiatric:         Attention and Perception: Attention normal.         Speech: Speech normal.         Behavior: Behavior normal. Behavior is cooperative.            Lab Results: I have reviewed the following results:  Estimated Creatinine Clearance: 88.8 mL/min (by C-G formula based on SCr of 0.9 mg/dL).  Lab Results   Component Value Date    WBC 4.90 02/06/2025    HGB 12.7 02/06/2025    HCT 38.2 02/06/2025     (L) 02/06/2025         Component Value Date/Time    K 4.0 02/06/2025 0530    K 4.1 04/22/2024 0930     02/06/2025 0530     04/22/2024 0930    CO2 30 02/06/2025 0530    CO2 31 04/22/2024 0930    BUN 14 02/06/2025 0530    BUN 16 04/22/2024 0930    CREATININE 0.90 02/06/2025 0530    CREATININE 1.18 04/22/2024 0930         Component Value Date/Time    CALCIUM 8.8 02/06/2025 0530    CALCIUM 9.9 04/22/2024 0930    ALKPHOS 55 02/03/2025 1146    ALKPHOS 59 04/22/2024 0930    AST 41 (H) 02/03/2025 1146    AST 29  04/22/2024 0930    ALT 13 02/03/2025 1146    ALT 12 04/22/2024 0930    TP 6.6 02/03/2025 1146    TP 6.9 04/22/2024 0930    ALB 4.7 02/03/2025 1146    ALB 5 04/22/2024 0930       Imaging Results Review: No pertinent imaging studies reviewed.  Other Study Results Review: No additional pertinent studies reviewed.     Administrative Statements   I have spent a total time of 38 minutes in caring for this patient on the day of the visit/encounter including Risks and benefits of tx options, Instructions for management, Patient and family education, Importance of tx compliance, Risk factor reductions, Impressions, Counseling / Coordination of care, Documenting in the medical record, Reviewing / ordering tests, medicine, procedures  , Obtaining or reviewing history  , Communicating with other healthcare professionals , and prescribing complex pain regimen..

## 2025-02-06 NOTE — CASE MANAGEMENT
Case Management Discharge Planning Note    Patient name Peter Delarosa  Location [facility] 624/[facility] 624-01 MRN [id number]  : 1955 Date 2025       Current Admission Date: 2/3/2025  Current Admission Diagnosis:Closed fracture of cervical vertebra (HCC)   Patient Active Problem List    Diagnosis Date Noted Date Diagnosed    IVH (intraventricular hemorrhage) (HCC) 2025     Closed fracture of cervical vertebra (HCC) 2025     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HCC) 2025     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HCC) 2025     Epicondylitis elbow, medial, left 2020     Trochanteric bursitis of right hip 2020     Primary osteoarthritis of both knees 2020     Primary osteoarthritis of both shoulders 2020       LOS (days): 3  Geometric Mean LOS (GMLOS) (days): 4.6  Days to GMLOS:1.5     OBJECTIVE:  Risk of Unplanned Readmission Score: 8.97         Current admission status: Inpatient   Preferred Pharmacy:   [id number] - [place] - [address] [address]  Phone: [phone] Fax: [phone]    [id number] - [place] - [address] [facility]  [address] [facility] [address]  Phone: [phone] Fax: [phone]    Primary Care Provider: Michael Leung MD    Primary Insurance: MEDICARE  Secondary Insurance: [employer]ET[employer]    DISCHARGE DETAILS:    Pt will d/c home once he is tolerating PO pain medication  Pt's wife will transport

## 2025-02-06 NOTE — PROGRESS NOTES
Progress Note - Trauma   Name: Peter Delarosa 69 y.o. male I MRN: [id number]  Unit/Bed#: [facility] 624-01 I Date of Admission: 2/3/2025   Date of Service: 2/6/2025 I Hospital Day: 3    Assessment & Plan  Closed fracture of cervical vertebra (HCC)  Acute comminuted fracture of right C5 articular pillar.  Acute displaced fracture of right C6 transverse process, and nondisplaced fracture of left vertebral body with extension into left transverse foramen.   -- Repeat CT head, results pending  -- Upright x-rays obtained, stable in appearance  -- Patient evaluated by neurosurgery, who is actively following  -- At this time patient is refused operative management  -- Neurosurgery to sign off and follow with the patient on outpatient basis  -- Recommendation for c-collar to remain in place + spinal precautions  -- Recommendation for follow-up CTA in x 1 week as an outpatient  -- Recommendation for MRI cervical spine at x 2 weeks  -- Regular neurochecks  -- APS consult placed for pain control   - Pt amenable to use of dilaudid PO / valium, had initial trial overnight, still requiring IV dilaudid pushes    -- Multimodal pain and nausea control  -- Hold AC/AP for x 2 weeks due to cervical epidural hematoma  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HCC)  Cervical spinal ventral epidural hematoma spans from at least C3-C7, worse at C5-C6 where there is circumferential involvement and suspected severe canal stenosis.     -See above      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HCC)  Occluded right vertebral artery V2 segment (proximally and distally) and V3 segment with multifocal areas of stenotic reconstituted flow, likely due to traumatic dissection injury.     -See above  IVH (intraventricular hemorrhage) (HCC)  - Repeat CT head results pending  - Neurosurgery following, signed off at this time  - Plan for interval reassessment at x 1 week follow-up  - Regular neurochecks  - Basic labs  - Stat CT head for acute drop in GCS    Bowel  Regimen: Senokot  VTE Prophylaxis:Reason for no pharmacologic prophylaxis epidural hematoma, SCD only per neurosurgery      Disposition: continue current level of care, dc pending pain control w/ tolerable PO regimen     24 Hour Events : No acute overnight events  Subjective : Pt reporting that since his pain was controlled around 12AM he has slept well and is feeling well, says prior to that time he was in a lot of pain. No other new issues / complaints.    Objective :  Temp:  [97.2 °F (36.2 °C)-98.8 °F (37.1 °C)] 98.7 °F (37.1 °C)  HR:  [65-71] 65  BP: (133-169)/() 133/77  Resp:  [16-21] 16  SpO2:  [94 %-96 %] 94 %  O2 Device: None (Room air)    I/O         02/04 0701  02/05 0700 02/05 0701  02/06 0700 02/06 0701  02/07 0700    P.O. 1440 718     I.V. (mL/kg) 1128.9 (13.7)      Total Intake(mL/kg) 2568.9 (31.1) 718 (8.7)     Urine (mL/kg/hr) 700 (0.4) 200 (0.1)     Stool 0      Total Output 700 200     Net +1868.9 +518            Unmeasured Urine Occurrence  2 x     Unmeasured Stool Occurrence 1 x              Physical Exam  Vitals and nursing note reviewed.   Constitutional:       General: He is not in acute distress.     Appearance: He is well-developed.   HENT:      Head: Normocephalic and atraumatic.   Eyes:      Conjunctiva/sclera: Conjunctivae normal.   Neck:      Comments: C collar in place  Cardiovascular:      Rate and Rhythm: Normal rate and regular rhythm.      Heart sounds: No murmur heard.  Pulmonary:      Effort: Pulmonary effort is normal. No respiratory distress.      Breath sounds: Normal breath sounds. No wheezing, rhonchi or rales.   Abdominal:      Palpations: Abdomen is soft.      Tenderness: There is no abdominal tenderness. There is no guarding or rebound.   Musculoskeletal:         General: No swelling.      Cervical back: Neck supple.      Comments: Generalized tenderness to the neck / shoulders, no bruising / deformities / skin change   Skin:     General: Skin is warm and dry.       Capillary Refill: Capillary refill takes less than 2 seconds.   Neurological:      Mental Status: He is alert.   Psychiatric:         Mood and Affect: Mood normal.               Lab Results: I have reviewed the following results:  Recent Labs     02/03/25  1146 02/04/25  0453 02/05/25  0548 02/06/25  0530   WBC  --  6.50   < > 4.90   HGB  --  14.1   < > 12.7   HCT  --  42.2   < > 38.2   PLT  --  147*   < > 133*   SODIUM 138 138   < > 138   K 3.9 3.8   < > 4.0    104   < > 103   CO2 26 28   < > 30   BUN 18 18   < > 14   CREATININE 1.28 1.22   < > 0.90   GLUC 101 90   < > 110   MG  --  2.0  --   --    PHOS  --  3.1  --   --    AST 41*  --   --   --    ALT 13  --   --   --    ALB 4.7  --   --   --    TBILI 0.83  --   --   --    ALKPHOS 55  --   --   --    PTT 29  --   --   --    INR 1.01  --   --   --     < > = values in this interval not displayed.

## 2025-02-06 NOTE — PLAN OF CARE
Problem: PAIN - ADULT  Goal: Verbalizes/displays adequate comfort level or baseline comfort level  Description: Interventions:  - Encourage patient to monitor pain and request assistance  - Assess pain using appropriate pain scale  - Administer analgesics based on type and severity of pain and evaluate response  - Implement non-pharmacological measures as appropriate and evaluate response  - Consider cultural and social influences on pain and pain management  - Notify physician/advanced practitioner if interventions unsuccessful or patient reports new pain  Outcome: Progressing     Problem: INFECTION - ADULT  Goal: Absence or prevention of progression during hospitalization  Description: INTERVENTIONS:  - Assess and monitor for signs and symptoms of infection  - Monitor lab/diagnostic results  - Monitor all insertion sites, i.e. indwelling lines, tubes, and drains  - Monitor endotracheal if appropriate and nasal secretions for changes in amount and color  - Arnold appropriate cooling/warming therapies per order  - Administer medications as ordered  - Instruct and encourage patient and family to use good hand hygiene technique  - Identify and instruct in appropriate isolation precautions for identified infection/condition  Outcome: Progressing     Problem: SAFETY ADULT  Goal: Patient will remain free of falls  Description: INTERVENTIONS:  - Educate patient/family on patient safety including physical limitations  - Instruct patient to call for assistance with activity   - Consult OT/PT to assist with strengthening/mobility   - Keep Call bell within reach  - Keep bed low and locked with side rails adjusted as appropriate  - Keep care items and personal belongings within reach  - Initiate and maintain comfort rounds  - Make Fall Risk Sign visible to staff  - Apply yellow socks and bracelet for high fall risk patients  - Consider moving patient to room near nurses station  Outcome: Progressing  Goal: Maintain or  return to baseline ADL function  Description: INTERVENTIONS:  -  Assess patient's ability to carry out ADLs; assess patient's baseline for ADL function and identify physical deficits which impact ability to perform ADLs (bathing, care of mouth/teeth, toileting, grooming, dressing, etc.)  - Assess/evaluate cause of self-care deficits   - Assess range of motion  - Assess patient's mobility; develop plan if impaired  - Assess patient's need for assistive devices and provide as appropriate  - Encourage maximum independence but intervene and supervise when necessary  - Involve family in performance of ADLs  - Assess for home care needs following discharge   - Consider OT consult to assist with ADL evaluation and planning for discharge  - Provide patient education as appropriate  Outcome: Progressing  Goal: Maintains/Returns to pre admission functional level  Description: INTERVENTIONS:  - Perform AM-PAC 6 Click Basic Mobility/ Daily Activity assessment daily.  - Set and communicate daily mobility goal to care team and patient/family/caregiver.   - Collaborate with rehabilitation services on mobility goals if consulted  - Out of bed for toileting  - Record patient progress and toleration of activity level   Outcome: Progressing     Problem: DISCHARGE PLANNING  Goal: Discharge to home or other facility with appropriate resources  Description: INTERVENTIONS:  - Identify barriers to discharge w/patient and caregiver  - Arrange for needed discharge resources and transportation as appropriate  - Identify discharge learning needs (meds, wound care, etc.)  - Arrange for interpretive services to assist at discharge as needed  - Refer to Case Management Department for coordinating discharge planning if the patient needs post-hospital services based on physician/advanced practitioner order or complex needs related to functional status, cognitive ability, or social support system  Outcome: Progressing     Problem: Knowledge  Deficit  Goal: Patient/family/caregiver demonstrates understanding of disease process, treatment plan, medications, and discharge instructions  Description: Complete learning assessment and assess knowledge base.  Interventions:  - Provide teaching at level of understanding  - Provide teaching via preferred learning methods  Outcome: Progressing     Problem: Prexisting or High Potential for Compromised Skin Integrity  Goal: Skin integrity is maintained or improved  Description: INTERVENTIONS:  - Identify patients at risk for skin breakdown  - Assess and monitor skin integrity  - Assess and monitor nutrition and hydration status  - Monitor labs   - Assess for incontinence   - Turn and reposition patient  - Assist with mobility/ambulation  - Relieve pressure over bony prominences  - Avoid friction and shearing  - Provide appropriate hygiene as needed including keeping skin clean and dry  - Evaluate need for skin moisturizer/barrier cream  - Collaborate with interdisciplinary team   - Patient/family teaching  - Consider wound care consult   Outcome: Progressing     Problem: Nutrition/Hydration-ADULT  Goal: Nutrient/Hydration intake appropriate for improving, restoring or maintaining nutritional needs  Description: Monitor and assess patient's nutrition/hydration status for malnutrition. Collaborate with interdisciplinary team and initiate plan and interventions as ordered.  Monitor patient's weight and dietary intake as ordered or per policy. Utilize nutrition screening tool and intervene as necessary. Determine patient's food preferences and provide high-protein, high-caloric foods as appropriate.     INTERVENTIONS:  - Monitor oral intake, urinary output, labs, and treatment plans  - Assess nutrition and hydration status and recommend course of action  - Evaluate amount of meals eaten  - Assist patient with eating if necessary   - Allow adequate time for meals  - Recommend/ encourage appropriate diets, oral  nutritional supplements, and vitamin/mineral supplements  - Order, calculate, and assess calorie counts as needed  - Recommend, monitor, and adjust tube feedings and TPN/PPN based on assessed needs  - Assess need for intravenous fluids  - Provide specific nutrition/hydration education as appropriate  - Include patient/family/caregiver in decisions related to nutrition  Outcome: Progressing
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
## 2025-02-06 NOTE — ASSESSMENT & PLAN NOTE
Acute comminuted fracture of right C5 articular pillar.  Acute displaced fracture of right C6 transverse process, and nondisplaced fracture of left vertebral body with extension into left transverse foramen.   -- Repeat CT head, results pending  -- Upright x-rays obtained, stable in appearance  -- Patient evaluated by neurosurgery, who is actively following  -- At this time patient is refused operative management  -- Neurosurgery to sign off and follow with the patient on outpatient basis  -- Recommendation for c-collar to remain in place + spinal precautions  -- Recommendation for follow-up CTA in x 1 week as an outpatient  -- Recommendation for MRI cervical spine at x 2 weeks  -- Regular neurochecks  -- APS consult placed for pain control   - Pt amenable to use of dilaudid PO / valium, had initial trial overnight, still requiring IV dilaudid pushes    -- Multimodal pain and nausea control  -- Hold AC/AP for x 2 weeks due to cervical epidural hematoma
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
## 2025-02-06 NOTE — PLAN OF CARE
Problem: PAIN - ADULT  Goal: Verbalizes/displays adequate comfort level or baseline comfort level  Description: Interventions:  - Encourage patient to monitor pain and request assistance  - Assess pain using appropriate pain scale  - Administer analgesics based on type and severity of pain and evaluate response  - Implement non-pharmacological measures as appropriate and evaluate response  - Consider cultural and social influences on pain and pain management  - Notify physician/advanced practitioner if interventions unsuccessful or patient reports new pain  Outcome: Progressing     Problem: INFECTION - ADULT  Goal: Absence or prevention of progression during hospitalization  Description: INTERVENTIONS:  - Assess and monitor for signs and symptoms of infection  - Monitor lab/diagnostic results  - Monitor all insertion sites, i.e. indwelling lines, tubes, and drains  - Monitor endotracheal if appropriate and nasal secretions for changes in amount and color  - [place] appropriate cooling/warming therapies per order  - Administer medications as ordered  - Instruct and encourage patient and family to use good hand hygiene technique  - Identify and instruct in appropriate isolation precautions for identified infection/condition  Outcome: Progressing  Goal: Absence of fever/infection during neutropenic period  Description: INTERVENTIONS:  - Monitor WBC    Outcome: Progressing     Problem: SAFETY ADULT  Goal: Patient will remain free of falls  Description: INTERVENTIONS:  - Educate patient/family on patient safety including physical limitations  - Instruct patient to call for assistance with activity   - Consult OT/PT to assist with strengthening/mobility   - Keep Call bell within reach  - Keep bed low and locked with side rails adjusted as appropriate  - Keep care items and personal belongings within reach  - Initiate and maintain comfort rounds  - Make Fall Risk Sign visible to staff  - Offer Toileting every 2 Hours,  in advance of need  - Initiate/Maintain alarm  - Obtain necessary fall risk management equipment:   - Apply yellow socks and bracelet for high fall risk patients  - Consider moving patient to room near nurses station  Outcome: Progressing  Goal: Maintain or return to baseline ADL function  Description: INTERVENTIONS:  -  Assess patient's ability to carry out ADLs; assess patient's baseline for ADL function and identify physical deficits which impact ability to perform ADLs (bathing, care of mouth/teeth, toileting, grooming, dressing, etc.)  - Assess/evaluate cause of self-care deficits   - Assess range of motion  - Assess patient's mobility; develop plan if impaired  - Assess patient's need for assistive devices and provide as appropriate  - Encourage maximum independence but intervene and supervise when necessary  - Involve family in performance of ADLs  - Assess for home care needs following discharge   - Consider OT consult to assist with ADL evaluation and planning for discharge  - Provide patient education as appropriate  Outcome: Progressing  Goal: Maintains/Returns to pre admission functional level  Description: INTERVENTIONS:  - Perform AM-PAC 6 Click Basic Mobility/ Daily Activity assessment daily.  - Set and communicate daily mobility goal to care team and patient/family/caregiver.   - Collaborate with rehabilitation services on mobility goals if consulted  - Perform Range of Motion 3 times a day.  - Reposition patient every 2 hours.  - Dangle patient 3 times a day  - Stand patient 3 times a day  - Ambulate patient 3 times a day  - Out of bed to chair 3 times a day   - Out of bed for meals 3 times a day  - Out of bed for toileting  - Record patient progress and toleration of activity level   Outcome: Progressing     Problem: DISCHARGE PLANNING  Goal: Discharge to home or other facility with appropriate resources  Description: INTERVENTIONS:  - Identify barriers to discharge w/patient and caregiver  -  Arrange for needed discharge resources and transportation as appropriate  - Identify discharge learning needs (meds, wound care, etc.)  - Arrange for interpretive services to assist at discharge as needed  - Refer to Case Management Department for coordinating discharge planning if the patient needs post-hospital services based on physician/advanced practitioner order or complex needs related to functional status, cognitive ability, or social support system  Outcome: Progressing     Problem: Knowledge Deficit  Goal: Patient/family/caregiver demonstrates understanding of disease process, treatment plan, medications, and discharge instructions  Description: Complete learning assessment and assess knowledge base.  Interventions:  - Provide teaching at level of understanding  - Provide teaching via preferred learning methods  Outcome: Progressing     Problem: Prexisting or High Potential for Compromised Skin Integrity  Goal: Skin integrity is maintained or improved  Description: INTERVENTIONS:  - Identify patients at risk for skin breakdown  - Assess and monitor skin integrity  - Assess and monitor nutrition and hydration status  - Monitor labs   - Assess for incontinence   - Turn and reposition patient  - Assist with mobility/ambulation  - Relieve pressure over bony prominences  - Avoid friction and shearing  - Provide appropriate hygiene as needed including keeping skin clean and dry  - Evaluate need for skin moisturizer/barrier cream  - Collaborate with interdisciplinary team   - Patient/family teaching  - Consider wound care consult   Outcome: Progressing     Problem: Nutrition/Hydration-ADULT  Goal: Nutrient/Hydration intake appropriate for improving, restoring or maintaining nutritional needs  Description: Monitor and assess patient's nutrition/hydration status for malnutrition. Collaborate with interdisciplinary team and initiate plan and interventions as ordered.  Monitor patient's weight and dietary intake as  ordered or per policy. Utilize nutrition screening tool and intervene as necessary. Determine patient's food preferences and provide high-protein, high-caloric foods as appropriate.     INTERVENTIONS:  - Monitor oral intake, urinary output, labs, and treatment plans  - Assess nutrition and hydration status and recommend course of action  - Evaluate amount of meals eaten  - Assist patient with eating if necessary   - Allow adequate time for meals  - Recommend/ encourage appropriate diets, oral nutritional supplements, and vitamin/mineral supplements  - Order, calculate, and assess calorie counts as needed  - Recommend, monitor, and adjust tube feedings and TPN/PPN based on assessed needs  - Assess need for intravenous fluids  - Provide specific nutrition/hydration education as appropriate  - Include patient/family/caregiver in decisions related to nutrition  Outcome: Progressing

## 2025-02-07 VITALS
HEART RATE: 82 BPM | RESPIRATION RATE: 16 BRPM | HEIGHT: 74 IN | TEMPERATURE: 99.8 F | DIASTOLIC BLOOD PRESSURE: 95 MMHG | OXYGEN SATURATION: 93 % | SYSTOLIC BLOOD PRESSURE: 163 MMHG | BODY MASS INDEX: 23.4 KG/M2 | WEIGHT: 182.32 LBS

## 2025-02-07 PROBLEM — S12.000A CLOSED DISPLACED FRACTURE OF FIRST CERVICAL VERTEBRA, UNSPECIFIED FRACTURE MORPHOLOGY, INITIAL ENCOUNTER (HCC): Status: ACTIVE | Noted: 2025-02-07

## 2025-02-07 LAB
ANION GAP SERPL CALCULATED.3IONS-SCNC: 7 MMOL/L (ref 4–13)
BUN SERPL-MCNC: 17 MG/DL (ref 5–25)
CALCIUM SERPL-MCNC: 9.1 MG/DL (ref 8.4–10.2)
CHLORIDE SERPL-SCNC: 100 MMOL/L (ref 96–108)
CO2 SERPL-SCNC: 29 MMOL/L (ref 21–32)
CREAT SERPL-MCNC: 0.86 MG/DL (ref 0.6–1.3)
ERYTHROCYTE [DISTWIDTH] IN BLOOD BY AUTOMATED COUNT: 12 % (ref 11.6–15.1)
GFR SERPL CREATININE-BSD FRML MDRD: 87 ML/MIN/1.73SQ M
GLUCOSE SERPL-MCNC: 100 MG/DL (ref 65–140)
HCT VFR BLD AUTO: 41.8 % (ref 36.5–49.3)
HGB BLD-MCNC: 13.8 G/DL (ref 12–17)
MCH RBC QN AUTO: 29.2 PG (ref 26.8–34.3)
MCHC RBC AUTO-ENTMCNC: 33 G/DL (ref 31.4–37.4)
MCV RBC AUTO: 89 FL (ref 82–98)
PLATELET # BLD AUTO: 161 THOUSANDS/UL (ref 149–390)
PMV BLD AUTO: 10.6 FL (ref 8.9–12.7)
POTASSIUM SERPL-SCNC: 4.1 MMOL/L (ref 3.5–5.3)
RBC # BLD AUTO: 4.72 MILLION/UL (ref 3.88–5.62)
SODIUM SERPL-SCNC: 136 MMOL/L (ref 135–147)
WBC # BLD AUTO: 5.22 THOUSAND/UL (ref 4.31–10.16)

## 2025-02-07 PROCEDURE — 80048 BASIC METABOLIC PNL TOTAL CA: CPT

## 2025-02-07 PROCEDURE — 99232 SBSQ HOSP IP/OBS MODERATE 35: CPT | Performed by: PHYSICIAN ASSISTANT

## 2025-02-07 PROCEDURE — 85027 COMPLETE CBC AUTOMATED: CPT

## 2025-02-07 RX ORDER — DIAZEPAM 5 MG/1
TABLET ORAL
Qty: 18 TABLET | Refills: 0 | Status: SHIPPED | OUTPATIENT
Start: 2025-02-07 | End: 2025-02-16

## 2025-02-07 RX ORDER — GABAPENTIN 100 MG/1
100 CAPSULE ORAL 3 TIMES DAILY
Qty: 90 CAPSULE | Refills: 0 | Status: SHIPPED | OUTPATIENT
Start: 2025-02-07

## 2025-02-07 RX ORDER — HYDROMORPHONE HYDROCHLORIDE 2 MG/1
TABLET ORAL
Qty: 90 TABLET | Refills: 0 | Status: SHIPPED | OUTPATIENT
Start: 2025-02-07 | End: 2025-02-11

## 2025-02-07 RX ORDER — METHOCARBAMOL 750 MG/1
TABLET, FILM COATED ORAL
Qty: 44 TABLET | Refills: 0 | Status: SHIPPED | OUTPATIENT
Start: 2025-02-07 | End: 2025-02-18

## 2025-02-07 RX ORDER — DULOXETIN HYDROCHLORIDE 30 MG/1
30 CAPSULE, DELAYED RELEASE ORAL DAILY
Qty: 30 CAPSULE | Refills: 0 | Status: SHIPPED | OUTPATIENT
Start: 2025-02-07

## 2025-02-07 RX ORDER — HYDROMORPHONE HYDROCHLORIDE 2 MG/1
2 TABLET ORAL EVERY 4 HOURS PRN
Qty: 48 TABLET | Refills: 0 | Status: SHIPPED | OUTPATIENT
Start: 2025-02-11 | End: 2025-02-11

## 2025-02-07 RX ORDER — LIDOCAINE 50 MG/G
2 PATCH TOPICAL DAILY
Qty: 60 PATCH | Refills: 0 | Status: SHIPPED | OUTPATIENT
Start: 2025-02-07

## 2025-02-07 RX ADMIN — METHOCARBAMOL 750 MG: 750 TABLET ORAL at 13:50

## 2025-02-07 RX ADMIN — ACETAMINOPHEN 975 MG: 325 TABLET, FILM COATED ORAL at 13:50

## 2025-02-07 RX ADMIN — METHOCARBAMOL 750 MG: 750 TABLET ORAL at 05:00

## 2025-02-07 RX ADMIN — GABAPENTIN 100 MG: 100 CAPSULE ORAL at 09:28

## 2025-02-07 RX ADMIN — HYDROMORPHONE HYDROCHLORIDE 6 MG: 2 TABLET ORAL at 00:28

## 2025-02-07 RX ADMIN — HYDROMORPHONE HYDROCHLORIDE 6 MG: 2 TABLET ORAL at 13:50

## 2025-02-07 RX ADMIN — METHOCARBAMOL 750 MG: 750 TABLET ORAL at 17:46

## 2025-02-07 RX ADMIN — PANTOPRAZOLE SODIUM 40 MG: 40 TABLET, DELAYED RELEASE ORAL at 05:00

## 2025-02-07 RX ADMIN — HYDROMORPHONE HYDROCHLORIDE 6 MG: 2 TABLET ORAL at 17:45

## 2025-02-07 RX ADMIN — HYDROMORPHONE HYDROCHLORIDE 6 MG: 2 TABLET ORAL at 09:30

## 2025-02-07 RX ADMIN — SENNOSIDES AND DOCUSATE SODIUM 2 TABLET: 50; 8.6 TABLET ORAL at 17:46

## 2025-02-07 RX ADMIN — ACETAMINOPHEN 975 MG: 325 TABLET, FILM COATED ORAL at 05:00

## 2025-02-07 RX ADMIN — HYDROMORPHONE HYDROCHLORIDE 6 MG: 2 TABLET ORAL at 04:56

## 2025-02-07 RX ADMIN — DIAZEPAM 5 MG: 5 TABLET ORAL at 06:09

## 2025-02-07 RX ADMIN — LIDOCAINE 2 PATCH: 700 PATCH TOPICAL at 09:28

## 2025-02-07 RX ADMIN — DIAZEPAM 5 MG: 5 TABLET ORAL at 12:38

## 2025-02-07 RX ADMIN — DULOXETINE 30 MG: 30 CAPSULE, DELAYED RELEASE ORAL at 09:28

## 2025-02-07 RX ADMIN — GABAPENTIN 100 MG: 100 CAPSULE ORAL at 17:46

## 2025-02-07 NOTE — DISCHARGE SUMMARY
Discharge Summary - Trauma   Name: Peter Delarosa 69 y.o. male I MRN: [id number]  Unit/Bed#: [facility] 624-01 I Date of Admission: 2/3/2025   Date of Service: 2/7/2025 I Hospital Day: 4    Assessment & Plan  Closed fracture of cervical vertebra (HCC)  Acute comminuted fracture of right C5 articular pillar.  Acute displaced fracture of right C6 transverse process, and nondisplaced fracture of left vertebral body with extension into left transverse foramen.   -- Repeat CT head, results pending  -- Upright x-rays obtained, stable in appearance  -- Patient evaluated by neurosurgery, who is actively following  -- At this time patient is refused operative management  -- Neurosurgery to sign off and follow with the patient on outpatient basis  -- Recommendation for c-collar to remain in place + spinal precautions  -- Recommendation for follow-up CTA in x 1 week as an outpatient  -- Recommendation for MRI cervical spine at x 2 weeks  -- Regular neurochecks  -- APS consult placed for pain control  -- Pt pain successfully treated w/ use of PO dilaudid / valium / robaxin  -- Hold AC/AP for x 2 weeks due to cervical epidural hematoma  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HCC)  Cervical spinal ventral epidural hematoma spans from at least C3-C7, worse at C5-C6 where there is circumferential involvement and suspected severe canal stenosis.     -See above      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HCC)  Occluded right vertebral artery V2 segment (proximally and distally) and V3 segment with multifocal areas of stenotic reconstituted flow, likely due to traumatic dissection injury.     -See above  IVH (intraventricular hemorrhage) (HCC)  - Repeat CT head stable  - Neurosurgery signed off at this time  - Plan for interval reassessment at x 1 week follow-up  - Regular neurochecks  - Basic labs  - Stat CT head for acute drop in GCS    Admission Date: 2/3/2025 1253  Discharge Date: 02/07/25  Admitting Diagnosis: Trauma [T14.90XA]  Epidural  hematoma ([facility]) [S06.4XAA]  Closed nondisplaced fracture of first cervical vertebra, unspecified fracture morphology, initial encounter ([facility]) [S12.001A]  Discharge Diagnosis:   Medical Problems       Resolved Problems  Date Reviewed: 8/30/2021   None         HPI: This is a 69-year-old gentleman who presented initially to the [facility] as a trauma, patient presented as a snowboarding accident, states he was traveling rapidly downhill and went over a bump or a ramp causing him to go up into the air, patient reports that he then landed on a fence while still strapped into his snowboard, denied loss of consciousness, initially stood up after the incident to remove himself from the fence but sat back down and felt that his neck was broken.  Patient initially had bilateral upper extremity weakness and numbness on the left side, this resolved prior to his transfer to [facility], patient did have a history of prior cervical spine injury, these neurological symptoms feel similar to his prior issues, initial exam was GCS of 15 and had intact motor and sensory function without any type of deficit, presented with a cervical spine tenderness and shoulder pain bilaterally.     Procedures Performed: No orders of the defined types were placed in this encounter.    Summary of Hospital Course:  patient was evaluated by the trauma team, he received a CTA of the head neck which demonstrated an acute comminuted fracture of the C5 articular pillar as well as a displaced fracture of the right C6 transverse process and a nondisplaced fracture of the left vertebral body with extension into the left transverse foramen, the patient also had an occluded right vertebral artery V2 segment and V3 segment multiple areas of stenotic reconstituted flow likely due to atraumatic dissection, the patient also demonstrated a epidural hematoma at the cervical level, C3-C7, patient had a chest abdomen and pelvis CT scan performed at the same  "time which is negative, he later had an MRI brain and C-spine performed which demonstrated trace intraventricular hemorrhage in both lateral ventricles and absent flow signal in the right vertebral artery segment, the same image demonstrated severe central stenosis of C3-C6 as well as moderate stenosis of C7-T1 patient also demonstrating multifocal ligamentous injuries, patient was placed on C-spine precautions, admitted to the ICU and was evaluated by the neurosurgical team, patient declined neurosurgical intervention, remained in a [place] collar, elected to continue with treatment in the [place] collar, was able to be de-escalated to medical surgical level of care, patient was recommended for x 2 weeks of no pharmacologic DVT prophylaxis, neurosurgery signed off with outpatient follow-up, patient had difficult to control pain was placed on a ketamine drip, which was subsequently de-escalated, APS was consulted prior to this, he ultimately required multiple IV Dilaudid pushes as well as as needed medications and was eventually able to be weaned to a p.o. regimen, patient continued to remain hemodynamically stable, continue to remain without any type of neurological deficit, continue to remain in a [place] C-spine collar.  Patient was evaluated by PT OT and case management, found appropriate for home/self-care.     Significant Findings, Care, Treatment and Services Provided:     CTA of the head neck  CT chest abdomen pelvis  MRI of the brain and C-spine  Neurosurgical consultation  Trauma evaluation  APS consultation  Ketamine infusion  PT/OT evaluation and treatment  Case management evaluation\    Complications: No acute/hospital complications    Discharge Day Visit / Exam:   /82   Pulse 84   Temp 98.5 °F (36.9 °C)   Resp 16   Ht 6' 2\" (1.88 m)   Wt 82.7 kg (182 lb 5.1 oz)   SpO2 95%   BMI 23.41 kg/m²   Physical Exam  Vitals and nursing note reviewed.   Constitutional:       General: He is not in acute " distress.     Appearance: He is well-developed. He is not ill-appearing or toxic-appearing.   HENT:      Head: Normocephalic and atraumatic.      Right Ear: There is no impacted cerumen.      Left Ear: There is no impacted cerumen.      Nose: No congestion or rhinorrhea.      Mouth/Throat:      Mouth: Mucous membranes are moist.      Pharynx: Oropharynx is clear.   Eyes:      Extraocular Movements: Extraocular movements intact.      Conjunctiva/sclera: Conjunctivae normal.      Pupils: Pupils are equal, round, and reactive to light.   Neck:      Comments: Aspen c-collar in place, securely fitted well-positioned  Cardiovascular:      Rate and Rhythm: Normal rate and regular rhythm.      Heart sounds: No murmur heard.  Pulmonary:      Effort: Pulmonary effort is normal. No respiratory distress.      Breath sounds: Normal breath sounds. No wheezing, rhonchi or rales.   Abdominal:      Palpations: Abdomen is soft.      Tenderness: There is no abdominal tenderness. There is no guarding or rebound.   Musculoskeletal:         General: Tenderness present. No swelling or deformity.      Cervical back: Neck supple.      Comments: Patient complaining of pain in the neck and back which is more well-controlled after administration of his medications   Skin:     General: Skin is warm and dry.      Capillary Refill: Capillary refill takes less than 2 seconds.      Coloration: Skin is not jaundiced or pale.      Findings: No erythema.   Neurological:      General: No focal deficit present.      Mental Status: He is alert and oriented to person, place, and time.      Cranial Nerves: No cranial nerve deficit.      Sensory: No sensory deficit.      Motor: No weakness.   Psychiatric:         Mood and Affect: Mood normal.          Discussion with Family: Updated  (wife) at bedside.    Condition at Discharge: stable       Discharge instructions/Information to patient and family:   See After Visit Summary (AVS) for  information provided to patient and family.      Provisions for Follow-Up Care:  See after visit summary for information related to follow-up care and any pertinent home health orders.      PCP: Michael Leung MD    Disposition: See After Visit Summary for discharge disposition information.    Planned Readmission: No     Discharge Medications:  See after visit summary for reconciled discharge medications provided to patient and family.      Discharge Statement:  I have spent a total time of 15 minutes in caring for this patient on the day of the visit/encounter. >30 minutes of time was spent on: Risks and benefits of tx options, Instructions for management, Importance of tx compliance, Counseling / Coordination of care, and Reviewing / ordering tests, medicine, procedures  .

## 2025-02-07 NOTE — ASSESSMENT & PLAN NOTE
- Repeat CT head stable  - Neurosurgery signed off at this time  - Plan for interval reassessment at x 1 week follow-up  - Regular neurochecks  - Basic labs  - Stat CT head for acute drop in GCS

## 2025-02-07 NOTE — PLAN OF CARE
Problem: PAIN - ADULT  Goal: Verbalizes/displays adequate comfort level or baseline comfort level  Description: Interventions:  - Encourage patient to monitor pain and request assistance  - Assess pain using appropriate pain scale  - Administer analgesics based on type and severity of pain and evaluate response  - Implement non-pharmacological measures as appropriate and evaluate response  - Consider cultural and social influences on pain and pain management  - Notify physician/advanced practitioner if interventions unsuccessful or patient reports new pain  Outcome: Progressing     Problem: INFECTION - ADULT  Goal: Absence or prevention of progression during hospitalization  Description: INTERVENTIONS:  - Assess and monitor for signs and symptoms of infection  - Monitor lab/diagnostic results  - Monitor all insertion sites, i.e. indwelling lines, tubes, and drains  - Monitor endotracheal if appropriate and nasal secretions for changes in amount and color  - [place] appropriate cooling/warming therapies per order  - Administer medications as ordered  - Instruct and encourage patient and family to use good hand hygiene technique  - Identify and instruct in appropriate isolation precautions for identified infection/condition  Outcome: Progressing     Problem: SAFETY ADULT  Goal: Patient will remain free of falls  Description: INTERVENTIONS:  - Educate patient/family on patient safety including physical limitations  - Instruct patient to call for assistance with activity   - Consult OT/PT to assist with strengthening/mobility   - Keep Call bell within reach  - Keep bed low and locked with side rails adjusted as appropriate  - Keep care items and personal belongings within reach  - Initiate and maintain comfort rounds  - Make Fall Risk Sign visible to staff  - Apply yellow socks and bracelet for high fall risk patients  - Consider moving patient to room near nurses station  Outcome: Progressing  Goal: Maintain or  return to baseline ADL function  Description: INTERVENTIONS:  -  Assess patient's ability to carry out ADLs; assess patient's baseline for ADL function and identify physical deficits which impact ability to perform ADLs (bathing, care of mouth/teeth, toileting, grooming, dressing, etc.)  - Assess/evaluate cause of self-care deficits   - Assess range of motion  - Assess patient's mobility; develop plan if impaired  - Assess patient's need for assistive devices and provide as appropriate  - Encourage maximum independence but intervene and supervise when necessary  - Involve family in performance of ADLs  - Assess for home care needs following discharge   - Consider OT consult to assist with ADL evaluation and planning for discharge  - Provide patient education as appropriate  Outcome: Progressing  Goal: Maintains/Returns to pre admission functional level  Description: INTERVENTIONS:  - Perform AM-PAC 6 Click Basic Mobility/ Daily Activity assessment daily.  - Set and communicate daily mobility goal to care team and patient/family/caregiver.   - Collaborate with rehabilitation services on mobility goals if consulted  - Out of bed for toileting  - Record patient progress and toleration of activity level   Outcome: Progressing     Problem: DISCHARGE PLANNING  Goal: Discharge to home or other facility with appropriate resources  Description: INTERVENTIONS:  - Identify barriers to discharge w/patient and caregiver  - Arrange for needed discharge resources and transportation as appropriate  - Identify discharge learning needs (meds, wound care, etc.)  - Arrange for interpretive services to assist at discharge as needed  - Refer to Case Management Department for coordinating discharge planning if the patient needs post-hospital services based on physician/advanced practitioner order or complex needs related to functional status, cognitive ability, or social support system  Outcome: Progressing     Problem: Knowledge  Deficit  Goal: Patient/family/caregiver demonstrates understanding of disease process, treatment plan, medications, and discharge instructions  Description: Complete learning assessment and assess knowledge base.  Interventions:  - Provide teaching at level of understanding  - Provide teaching via preferred learning methods  Outcome: Progressing     Problem: Prexisting or High Potential for Compromised Skin Integrity  Goal: Skin integrity is maintained or improved  Description: INTERVENTIONS:  - Identify patients at risk for skin breakdown  - Assess and monitor skin integrity  - Assess and monitor nutrition and hydration status  - Monitor labs   - Assess for incontinence   - Turn and reposition patient  - Assist with mobility/ambulation  - Relieve pressure over bony prominences  - Avoid friction and shearing  - Provide appropriate hygiene as needed including keeping skin clean and dry  - Evaluate need for skin moisturizer/barrier cream  - Collaborate with interdisciplinary team   - Patient/family teaching  - Consider wound care consult   Outcome: Progressing     Problem: Nutrition/Hydration-ADULT  Goal: Nutrient/Hydration intake appropriate for improving, restoring or maintaining nutritional needs  Description: Monitor and assess patient's nutrition/hydration status for malnutrition. Collaborate with interdisciplinary team and initiate plan and interventions as ordered.  Monitor patient's weight and dietary intake as ordered or per policy. Utilize nutrition screening tool and intervene as necessary. Determine patient's food preferences and provide high-protein, high-caloric foods as appropriate.     INTERVENTIONS:  - Monitor oral intake, urinary output, labs, and treatment plans  - Assess nutrition and hydration status and recommend course of action  - Evaluate amount of meals eaten  - Assist patient with eating if necessary   - Allow adequate time for meals  - Recommend/ encourage appropriate diets, oral  nutritional supplements, and vitamin/mineral supplements  - Order, calculate, and assess calorie counts as needed  - Recommend, monitor, and adjust tube feedings and TPN/PPN based on assessed needs  - Assess need for intravenous fluids  - Provide specific nutrition/hydration education as appropriate  - Include patient/family/caregiver in decisions related to nutrition  Outcome: Progressing

## 2025-02-07 NOTE — ASSESSMENT & PLAN NOTE
Acute comminuted fracture of right C5 articular pillar.  Acute displaced fracture of right C6 transverse process, and nondisplaced fracture of left vertebral body with extension into left transverse foramen.   -- Repeat CT head, results pending  -- Upright x-rays obtained, stable in appearance  -- Patient evaluated by neurosurgery, who is actively following  -- At this time patient is refused operative management  -- Neurosurgery to sign off and follow with the patient on outpatient basis  -- Recommendation for c-collar to remain in place + spinal precautions  -- Recommendation for follow-up CTA in x 1 week as an outpatient  -- Recommendation for MRI cervical spine at x 2 weeks  -- Regular neurochecks  -- APS consult placed for pain control  -- Pt pain successfully treated w/ use of PO dilaudid / valium / robaxin  -- Hold AC/AP for x 2 weeks due to cervical epidural hematoma

## 2025-02-07 NOTE — PROGRESS NOTES
Progress Note - Trauma   Name: Peter Delarosa 69 y.o. male I MRN: [id number]  Unit/Bed#: [facility] 624-01 I Date of Admission: 2/3/2025   Date of Service: 2/7/2025 I Hospital Day: 4  { ?Quick Links I Problem List I FRANDY DUNCAN Billing Tip:41019}  Assessment & Plan  Closed fracture of cervical vertebra (HCC)  Acute comminuted fracture of right C5 articular pillar.  Acute displaced fracture of right C6 transverse process, and nondisplaced fracture of left vertebral body with extension into left transverse foramen.   -- Repeat CT head, results pending  -- Upright x-rays obtained, stable in appearance  -- Patient evaluated by neurosurgery, who is actively following  -- At this time patient is refused operative management  -- Neurosurgery to sign off and follow with the patient on outpatient basis  -- Recommendation for c-collar to remain in place + spinal precautions  -- Recommendation for follow-up CTA in x 1 week as an outpatient  -- Recommendation for MRI cervical spine at x 2 weeks  -- Regular neurochecks  -- APS consult placed for pain control   - Pt amenable to use of dilaudid PO / valium, had initial trial overnight, still requiring IV dilaudid pushes    -- Multimodal pain and nausea control  -- Hold AC/AP for x 2 weeks due to cervical epidural hematoma  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HCC)  Cervical spinal ventral epidural hematoma spans from at least C3-C7, worse at C5-C6 where there is circumferential involvement and suspected severe canal stenosis.     -See above      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HCC)  Occluded right vertebral artery V2 segment (proximally and distally) and V3 segment with multifocal areas of stenotic reconstituted flow, likely due to traumatic dissection injury.     -See above  IVH (intraventricular hemorrhage) (HCC)  - Repeat CT head results pending  - Neurosurgery following, signed off at this time  - Plan for interval reassessment at x 1 week follow-up  - Regular neurochecks  -  "Basic labs  - Stat CT head for acute drop in GCS    Bowel Regimen: Senokot  VTE Prophylaxis:Reason for no pharmacologic prophylaxis epidural hematoma, neurosurgery rec, no chemical DVT ppx at this time      Disposition: *** {MDM/Admin Statements (Optional):96113}    24 Hour Events : ***  Subjective : ***    Objective :{?Quick Links I ICU Summary I Vitals I I/Os I LDAs I Mobility (PT/OT) I Code Status / ACP   ?Quick Links I Active Meds I Pain Meds I Antibiotics I Anticoagulants:56141}  Temp:  [98.1 °F (36.7 °C)-99.1 °F (37.3 °C)] 99.1 °F (37.3 °C)  HR:  [64-73] 64  BP: (136-158)/(76-86) 158/86  Resp:  [16-18] 18  SpO2:  [93 %-96 %] 93 %  O2 Device: None (Room air)    I/O         02/05 0701 02/06 0700 02/06 0701 02/07 0700 02/07 0701  02/08 0700    P.O. 718 712     I.V. (mL/kg)       Total Intake(mL/kg) 718 (8.7) 712 (8.6)     Urine (mL/kg/hr) 200 (0.1) 225 (0.1)     Stool       Total Output 200 225     Net +518 +487            Unmeasured Urine Occurrence 2 x 3 x             Physical Exam ***       {?Quick Links I Lab Review I Micro Results I Radiology I Cardiology:07101}  Lab Results: I have reviewed the following results:  Recent Labs     02/06/25  0530 02/07/25  0601   WBC 4.90  --    HGB 12.7  --    HCT 38.2  --    *  --    SODIUM 138 136   K 4.0 4.1    100   CO2 30 29   BUN 14 17   CREATININE 0.90 0.86   GLUC 110 100       {Imaging Results Review:74366::\"No pertinent imaging studies reviewed.\"}  {Other Study Results Review:36873::\"No additional pertinent studies reviewed.\"}  "

## 2025-02-07 NOTE — PLAN OF CARE
Problem: PAIN - ADULT  Goal: Verbalizes/displays adequate comfort level or baseline comfort level  Description: Interventions:  - Encourage patient to monitor pain and request assistance  - Assess pain using appropriate pain scale  - Administer analgesics based on type and severity of pain and evaluate response  - Implement non-pharmacological measures as appropriate and evaluate response  - Consider cultural and social influences on pain and pain management  - Notify physician/advanced practitioner if interventions unsuccessful or patient reports new pain  Outcome: Progressing     Problem: INFECTION - ADULT  Goal: Absence or prevention of progression during hospitalization  Description: INTERVENTIONS:  - Assess and monitor for signs and symptoms of infection  - Monitor lab/diagnostic results  - Monitor all insertion sites, i.e. indwelling lines, tubes, and drains  - Monitor endotracheal if appropriate and nasal secretions for changes in amount and color  - [place] appropriate cooling/warming therapies per order  - Administer medications as ordered  - Instruct and encourage patient and family to use good hand hygiene technique  - Identify and instruct in appropriate isolation precautions for identified infection/condition  Outcome: Progressing     Problem: SAFETY ADULT  Goal: Patient will remain free of falls  Description: INTERVENTIONS:  - Educate patient/family on patient safety including physical limitations  - Instruct patient to call for assistance with activity   - Consult OT/PT to assist with strengthening/mobility   - Keep Call bell within reach  - Keep bed low and locked with side rails adjusted as appropriate  - Keep care items and personal belongings within reach  - Initiate and maintain comfort rounds  - Make Fall Risk Sign visible to staff  - Apply yellow socks and bracelet for high fall risk patients  - Consider moving patient to room near nurses station  Outcome: Progressing  Goal: Maintain or  return to baseline ADL function  Description: INTERVENTIONS:  -  Assess patient's ability to carry out ADLs; assess patient's baseline for ADL function and identify physical deficits which impact ability to perform ADLs (bathing, care of mouth/teeth, toileting, grooming, dressing, etc.)  - Assess/evaluate cause of self-care deficits   - Assess range of motion  - Assess patient's mobility; develop plan if impaired  - Assess patient's need for assistive devices and provide as appropriate  - Encourage maximum independence but intervene and supervise when necessary  - Involve family in performance of ADLs  - Assess for home care needs following discharge   - Consider OT consult to assist with ADL evaluation and planning for discharge  - Provide patient education as appropriate  Outcome: Progressing  Goal: Maintains/Returns to pre admission functional level  Description: INTERVENTIONS:  - Perform AM-PAC 6 Click Basic Mobility/ Daily Activity assessment daily.  - Set and communicate daily mobility goal to care team and patient/family/caregiver.   - Collaborate with rehabilitation services on mobility goals if consulted  - Out of bed for toileting  - Record patient progress and toleration of activity level   Outcome: Progressing     Problem: DISCHARGE PLANNING  Goal: Discharge to home or other facility with appropriate resources  Description: INTERVENTIONS:  - Identify barriers to discharge w/patient and caregiver  - Arrange for needed discharge resources and transportation as appropriate  - Identify discharge learning needs (meds, wound care, etc.)  - Arrange for interpretive services to assist at discharge as needed  - Refer to Case Management Department for coordinating discharge planning if the patient needs post-hospital services based on physician/advanced practitioner order or complex needs related to functional status, cognitive ability, or social support system  Outcome: Progressing

## 2025-02-07 NOTE — PROGRESS NOTES
Progress Note - Acute Pain   Name: Peter Delarosa 69 y.o. male I MRN: [id number]  Unit/Bed#: [facility] 624-01 I Date of Admission: 2/3/2025   Date of Service: 2/7/2025 I Hospital Day: 4    Assessment & Plan  Closed fracture of cervical vertebra (HCC)  s/p snowboarding accident resulting in multiple cervical injuries including epidural hematoma causing severe spinal stenosis, ligamentous injury, C5-6 fracture  Has been evaluated by neurosurgery, will continue with conservative management for now but may require surgical intervention down the line (thus far patient adamantly declining surgery)  He continues to have sporadic pain in his shoulder blades and shoulder bilaterally, feels pain regimen works sometimes and other times note.  Did discuss utilizing valium for given more muscular nature of pain.  Also discussed opioid rotation to PO dilaudid however patient is on the fence.  Will keep regimen more or less the same and reconsider opioid rotation tomorrow with plan to decrease IV dilaudid tomorrow as well.     Regimen:  Ketamine discontinued 2/4/2025 secondary to mild hallucinations  Continue Tylenol 975 mg every 8 hours scheduled  Continue Cymbalta 30 mg daily  Continue gabapentin 100 mg 3 times daily  Continue lidocaine patches 2 on bilateral shoulders, 12 hours on and 12 hours off  Continue Robaxin 750mg every 6 hours scheduled  Continue Valium 5 mg every 6 hours as needed for muscle spasm  Encouraged more regular use of this for muscular pain  Continue Dilaudid 4 mg p.o. every 4 hours as needed moderate pain or 6 mg p.o. every 4 hours as needed severe pain.  Continue IV Dilaudid 0.5 mg every 2 hours as needed for breakthrough pain.  Patient encouraged to avoid IV opioids is much as possible to ensure that oral regimen is effective prior to discharge.  Continue robust bowel regimen to avoid opioid-induced constipation  As needed Narcan added to reverse respiratory depression secondary to opioid use    At  discharge, suggest the following:  Acetaminophen 975 mg p.o. every 8 hours as needed mild pain.  Duloxetine 30 mg p.o. daily.  Gabapentin 100 mg p.o. 3 times daily.  Lidocaine patch x 2, on for 12 hours and off for 12 hours.  Methocarbamol 750 mg p.o. every 6 hours scheduled x 3 days, then every 6 hours as needed muscle spasm  Diazepam 5 mg p.o. every 8 hours as needed muscle spasm x 3 days, then every 12 hours as needed muscle spasm x 3 days, then once daily as needed muscle spasms x 3 days.  Hydromorphone 6 mg p.o. every 4 hours as needed severe pain x 3 days, then 4 mg p.o. every 4 hours as needed severe pain x 3 days, then 2 mg p.o. every 4 hours as needed severe pain until reevaluated in transitional pain clinic.  Patient to follow-up in transitional pain clinic and will receive a call from clinic to schedule.  Patient is tentatively traveling for 1 week starting next Tuesday, 2/11/2025.  If patient does not travel, can follow-up on 2/11/2025 with transitional pain (either virtually or in person).  If patient does travel he will schedule for the first opportunity upon returning.  The above-noted taper of diazepam and hydromorphone are in anticipation that patient may or may not be able to follow-up with transitional pain clinic next week.  Patient is instructed not to take previously prescribed opioids or benzodiazepines until instructed to do so by transitional pain clinic provider.  Will prescribe intranasal Narcan in the event that opioid reversal becomes necessary.  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HCC)    - see plan above    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HCC)    - see plan above    IVH (intraventricular hemorrhage) (HCC)  Neurosurgery management    APS will continue to follow. Please contact Acute Pain Service - via SecureChat from [phone] with additional questions or concerns. See SecureChat or Cyber-Rain for additional contacts and after hours information.     Bonnie Delarosa is a  69 y.o. male who presents after snowboarding accident resulting in cervical epidural hematoma, vertebral artery dissection and closed fracture of the cervical vertebrae. APS was consulted for posttraumatic pain.     Pain History  Current pain location(s):  Pain Score: 6  Pain Location/Orientation: Orientation: Bilateral, Location: Neck  Pain Scale: Pain Assessment Tool: 0-10  24 hour history: Patient feels his pain is improving somewhat every day.  Discussed whether patient felt he would be comfortable going home and he does believe he would be.  Has used IV opioid for breakthrough, however less so in the past 24 hours.    Opioid requirement previous 24 hours: Dilaudid 6 mg p.o. x 5, Dilaudid 0.5 mg IV x 4.  Meds/Allergies   all current active meds have been reviewed, current meds:   Current Facility-Administered Medications:     acetaminophen (TYLENOL) tablet 975 mg, Q8H NOE    bisacodyl (DULCOLAX) rectal suppository 10 mg, Daily PRN    diazepam (VALIUM) tablet 5 mg, Q6H PRN    DULoxetine (CYMBALTA) delayed release capsule 30 mg, Daily    gabapentin (NEURONTIN) capsule 100 mg, TID    HYDROmorphone (DILAUDID) injection 0.5 mg, Q2H PRN    HYDROmorphone (DILAUDID) tablet 4 mg, Q4H PRN    HYDROmorphone (DILAUDID) tablet 6 mg, Q4H PRN    lidocaine (LIDODERM) 5 % patch 2 patch, Daily    methocarbamol (ROBAXIN) tablet 750 mg, Q6H ONE    naloxone (NARCAN) injection 0.1 mg, Q1MIN PRN    ondansetron (ZOFRAN) injection 4 mg, Q4H PRN    pantoprazole (PROTONIX) EC tablet 40 mg, Early Morning    senna-docusate sodium (SENOKOT S) 8.6-50 mg per tablet 2 tablet, BID, and PTA meds:   Prior to Admission Medications   Prescriptions Last Dose Informant Patient Reported? Taking?   HYDROcodone-acetaminophen (NORCO) 5-325 mg per tablet   Yes No   Sig: Take 1 tablet by mouth every 6 (six) hours as needed for pain      Facility-Administered Medications Last Administration Doses Remaining   Sodium Hyaluronate 20 mg 7/31/2020 12:12 PM     Sodium Hyaluronate 20 mg 7/31/2020 12:12 PM         Allergies   Allergen Reactions    Tetracyclines & Related      Objective :  Temp:  [98.7 °F (37.1 °C)-99.1 °F (37.3 °C)] 98.7 °F (37.1 °C)  HR:  [64-79] 79  BP: (123-158)/(76-86) 123/81  Resp:  [16-18] 18  SpO2:  [93 %-95 %] 94 %  O2 Device: None (Room air)    Physical Exam  Vitals and nursing note reviewed.   Constitutional:       General: He is awake. He is not in acute distress.     Appearance: He is not ill-appearing, toxic-appearing or diaphoretic.      Interventions: Cervical collar in place.   Skin:     General: Skin is warm and dry.   Neurological:      Mental Status: He is alert and oriented to person, place, and time.      GCS: GCS eye subscore is 4. GCS verbal subscore is 5. GCS motor subscore is 6.   Psychiatric:         Attention and Perception: Attention normal.         Speech: Speech normal.         Behavior: Behavior normal. Behavior is cooperative.            Lab Results: I have reviewed the following results:  Estimated Creatinine Clearance: 92.9 mL/min (by C-G formula based on SCr of 0.86 mg/dL).  Lab Results   Component Value Date    WBC 5.22 02/07/2025    HGB 13.8 02/07/2025    HCT 41.8 02/07/2025     02/07/2025         Component Value Date/Time    K 4.1 02/07/2025 0601    K 4.1 04/22/2024 0930     02/07/2025 0601     04/22/2024 0930    CO2 29 02/07/2025 0601    CO2 31 04/22/2024 0930    BUN 17 02/07/2025 0601    BUN 16 04/22/2024 0930    CREATININE 0.86 02/07/2025 0601    CREATININE 1.18 04/22/2024 0930         Component Value Date/Time    CALCIUM 9.1 02/07/2025 0601    CALCIUM 9.9 04/22/2024 0930    ALKPHOS 55 02/03/2025 1146    ALKPHOS 59 04/22/2024 0930    AST 41 (H) 02/03/2025 1146    AST 29 04/22/2024 0930    ALT 13 02/03/2025 1146    ALT 12 04/22/2024 0930    TP 6.6 02/03/2025 1146    TP 6.9 04/22/2024 0930    ALB 4.7 02/03/2025 1146    ALB 5 04/22/2024 0930       Imaging Results Review: No pertinent imaging studies  reviewed.  Other Study Results Review: No additional pertinent studies reviewed.     Administrative Statements   I have spent a total time of 31 minutes in caring for this patient on the day of the visit/encounter including Risks and benefits of tx options, Instructions for management, Patient and family education, Importance of tx compliance, Risk factor reductions, Impressions, Counseling / Coordination of care, Documenting in the medical record, Reviewing / ordering tests, medicine, procedures  , Obtaining or reviewing history  , and Communicating with other healthcare professionals .

## 2025-02-10 ENCOUNTER — TELEPHONE (OUTPATIENT)
Age: 70
End: 2025-02-10

## 2025-02-10 ENCOUNTER — TELEPHONE (OUTPATIENT)
Dept: OBGYN CLINIC | Facility: HOSPITAL | Age: 70
End: 2025-02-10

## 2025-02-10 NOTE — TELEPHONE ENCOUNTER
Caller: Patient spouse Greta    Doctor: Dr. Kalen Peng    Reason for call: Patient is having alot of constipation and would like to know is meds intake     Patient still having double vision    Offered an appt for tomorrow and spouse concern regarding his pain    Stated Discharge Paper work is unclear      Call back#:

## 2025-02-11 ENCOUNTER — TELEPHONE (OUTPATIENT)
Age: 70
End: 2025-02-11

## 2025-02-11 ENCOUNTER — TELEPHONE (OUTPATIENT)
Dept: NEUROSURGERY | Facility: CLINIC | Age: 70
End: 2025-02-11

## 2025-02-11 ENCOUNTER — TELEPHONE (OUTPATIENT)
Dept: OBGYN CLINIC | Facility: HOSPITAL | Age: 70
End: 2025-02-11

## 2025-02-11 DIAGNOSIS — Z87.81 H/O CERVICAL FRACTURE: Primary | ICD-10-CM

## 2025-02-11 RX ORDER — HYDROMORPHONE HYDROCHLORIDE 2 MG/1
2 TABLET ORAL EVERY 4 HOURS PRN
Qty: 60 TABLET | Refills: 0 | Status: SHIPPED | OUTPATIENT
Start: 2025-02-11 | End: 2025-02-21

## 2025-02-11 NOTE — TELEPHONE ENCOUNTER
Caller: Greta pt's spouse    Doctor: Dr. Wesley    Reason for call: pt's dilaudid medication should not have gone to [facility] [place]  please re-send it to   [id number] - [place] - [address] [place] [address] [phone]     Call back#: [phone]

## 2025-02-11 NOTE — TELEPHONE ENCOUNTER
Caller: Ilda wife     Doctor: Dr. Wesley    Reason for call: Medication   lidocaine (LIDODERM) 5 % pt currently has 29 please send to   [id number] - [place] - [address] [phone]     Call back#: [phone]

## 2025-02-11 NOTE — TELEPHONE ENCOUNTER
Received a call from Greta reporting diplopia. She is unsure if he was experiencing this prior to discharge as he did not have his eye glasses. Upon further clarification with Peter he indicates he first noticed this before discharge while walking with the nurse and admits this seems to be intermittent and seems to occurs regardless of if he is wearing his glasses and resolves spontaneously. At this time they deny any other neurologic deficits such as confusion, slurred speech, ambulatory dysfunction, etc. I advised that given the intermittent nature of his symptoms as well as lack of other deficits should monitor for now. Directed them to the ER with red flag symptoms as mentioned above.    He also reports shoulder pain that feels similar to muscle strain. Advised this is likely a combination of the trauma from his accident, nerve irritation and muscle symptoms associated with redistribution of he head weigh due to collar. This should improve with time and be monitored for now.     They were encouraged to call with any other questions or concerns.

## 2025-02-11 NOTE — TELEPHONE ENCOUNTER
Spoke with patient and patient's spouse.  Dilaudid [facility].  Patient lives closer to [facility] [place]  Would prefer prescription be sent there.    Prescription for Dilaudid 2 mg tablets, 1 tablet every 4 hours as needed for severe pain x 10 days (#60) with no refills sent to [facility] in [facility].    Kalen Mckeon PA-C

## 2025-02-11 NOTE — TELEPHONE ENCOUNTER
Caller: Greta, pt's wife    Doctor: Dr. Kalen Peng     Reason for call: pt's wife called stating she could use some assistance with getting some DME, raised toilet seat, possible walker (pt is walking with a cane).  Pt has been having double vision which was not addressed while in hospital (maybe a concussion).  Can someone please look into having a nurse from  call the pt's wife to help organize these things.      Call back#: [phone]

## 2025-02-11 NOTE — TELEPHONE ENCOUNTER
Pt wife called to confirm the timing of her husbands appt, imaging is 2/19/25 and [facility] 2/24/25. She was concerned that she was told to be back in 1 week so I went through the timeline to confirm for her.    Pt wife states pt is having double vision and wishes to speak clinical staff, warm transfer to [facility] RN

## 2025-02-11 NOTE — TELEPHONE ENCOUNTER
Attempted to reach patient by phone number listed in chart. Additionally, I left patient a MyChart message in attempt to get in contact with him.  Attempting to reach patient to help schedule him with a Transitional Pain provider for as soon as possible.    If patient calls back, please schedule him with Kalen Wesley PA-C, for this Thursday in any available spot. If patient cannot do that day, he can be placed on TIMA Mehta, schedule for next week in any available spot.     Thank you in advance.    Sadie Guillen

## 2025-02-12 ENCOUNTER — TELEPHONE (OUTPATIENT)
Dept: OBGYN CLINIC | Facility: HOSPITAL | Age: 70
End: 2025-02-12

## 2025-02-12 ENCOUNTER — PATIENT OUTREACH (OUTPATIENT)
Dept: OBGYN CLINIC | Facility: HOSPITAL | Age: 70
End: 2025-02-12

## 2025-02-12 DIAGNOSIS — Z78.9 NEED FOR FOLLOW-UP BY SOCIAL WORKER: Primary | ICD-10-CM

## 2025-02-12 NOTE — PROGRESS NOTES
[facility] received a IB message in regard to pt who was ED to hospital admit on 02/03/2025 due to closed fracture of cervical vertebra. Pt wife contacted our office requesting a walker and raised toilet seat stating pt was DC with no no needed DME. This order would need to be entered by RN care manger. [facility] contacted pt wife today and she is not happy about pt care in the hospital. Per pt wife, pt was in severe pain and it was not managed correctly. Pt wife also shares that providers spoke to pt while he was heavily medicated without providing her information. Pt wife requested phone numbers to make a complaint. [facility] provided pt wife with phone number [phone] or [phone]. Pt wife was appreciative of the information.   Pt wife also did advise today that they are no longer interested in walker or raised toilet seat. They feel it would have been a nice to have the first few days, but at this point he has been managing and they are no longer interested in the equipment. [facility] will remain available for any future needs or concerns.

## 2025-02-12 NOTE — TELEPHONE ENCOUNTER
Reached out to pt's spouse regarding appt with Transitional Pain.     She is going to hold off on scheduling for now, as pt has enough pain medication and has started his taper, and is doing well.    I advised her to call and schedule pt for either in person, or virtual if he cannot make the car ride, before he runs out of medication-if he is going to require refills. Phone number for Transitional pain provided. Pt's spouse expressed understanding.      Also advised that social work will be reaching out. Script for walker is already in pt's chart.     Message sent to discharging trauma team (per TP provider instruction) for an order to be placed for raised toilet seat.

## 2025-02-13 ENCOUNTER — TELEPHONE (OUTPATIENT)
Dept: NEUROSURGERY | Facility: CLINIC | Age: 70
End: 2025-02-13

## 2025-02-13 NOTE — TELEPHONE ENCOUNTER
Call received from patient's spouse looking to discuss patient's activity restrictions. She stated patient has been struggling with pain however this is being managed by the PM team outpatient. She stated patient does find relief when laying down. She questioned if him laying down during the day would cause him harm. Advised laying down outside of self care and eating his meals is appropriate. Also advised he should not be lifting anything more than 10 pounds. Patient has been compliant with his collar.     Greta stated an understanding and was appreciative of the call.

## 2025-02-18 ENCOUNTER — PATIENT OUTREACH (OUTPATIENT)
Dept: OBGYN CLINIC | Facility: HOSPITAL | Age: 70
End: 2025-02-18

## 2025-02-19 ENCOUNTER — HOSPITAL ENCOUNTER (OUTPATIENT)
Dept: MRI IMAGING | Facility: HOSPITAL | Age: 70
Discharge: HOME | End: 2025-02-19
Payer: MEDICARE

## 2025-02-19 ENCOUNTER — HOSPITAL ENCOUNTER (OUTPATIENT)
Dept: CT IMAGING | Facility: HOSPITAL | Age: 70
Discharge: HOME | End: 2025-02-19
Payer: MEDICARE

## 2025-02-19 DIAGNOSIS — I77.74 VERTEBRAL ARTERY DISSECTION (HCC): ICD-10-CM

## 2025-02-19 DIAGNOSIS — S06.4XAA EPIDURAL HEMATOMA (HCC): ICD-10-CM

## 2025-02-19 PROCEDURE — 70498 CT ANGIOGRAPHY NECK: CPT

## 2025-02-19 PROCEDURE — 72141 MRI NECK SPINE W/O DYE: CPT

## 2025-02-19 PROCEDURE — 70496 CT ANGIOGRAPHY HEAD: CPT

## 2025-02-19 RX ADMIN — IOHEXOL 85 ML: 350 INJECTION, SOLUTION INTRAVENOUS at 16:15

## 2025-02-20 ENCOUNTER — PATIENT OUTREACH (OUTPATIENT)
Dept: OBGYN CLINIC | Facility: HOSPITAL | Age: 70
End: 2025-02-20

## 2025-02-24 ENCOUNTER — OFFICE VISIT (OUTPATIENT)
Dept: NEUROSURGERY | Facility: CLINIC | Age: 70
End: 2025-02-24
Payer: MEDICARE

## 2025-02-24 VITALS
DIASTOLIC BLOOD PRESSURE: 76 MMHG | BODY MASS INDEX: 23.36 KG/M2 | OXYGEN SATURATION: 97 % | RESPIRATION RATE: 16 BRPM | HEIGHT: 74 IN | WEIGHT: 182 LBS | TEMPERATURE: 98.3 F | HEART RATE: 71 BPM | SYSTOLIC BLOOD PRESSURE: 154 MMHG

## 2025-02-24 DIAGNOSIS — I77.74 VERTEBRAL ARTERY DISSECTION (HCC): ICD-10-CM

## 2025-02-24 DIAGNOSIS — S12.500A C6 CERVICAL FRACTURE (HCC): Primary | ICD-10-CM

## 2025-02-24 PROCEDURE — 99215 OFFICE O/P EST HI 40 MIN: CPT | Performed by: SPECIALIST

## 2025-02-24 RX ORDER — HYDROMORPHONE HYDROCHLORIDE 2 MG/1
2 TABLET ORAL EVERY 4 HOURS PRN
COMMUNITY

## 2025-02-24 RX ORDER — ASPIRIN 325 MG
325 TABLET ORAL DAILY
Qty: 30 TABLET | Refills: 2 | Status: SHIPPED | OUTPATIENT
Start: 2025-02-24 | End: 2025-05-25

## 2025-02-24 NOTE — ASSESSMENT & PLAN NOTE
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  With his resolving epidural hemorrhage I believe it is relatively safe yet imperative that he starts antiplatelet therapy.  He will initiate aspirin 325 mg daily.  He will return in approximately 1 month with a new CTA of the neck.  Orders:    aspirin 325 mg tablet; Take 1 tablet (325 mg total) by mouth daily    CTA neck w wo contrast; Future

## 2025-02-24 NOTE — PROGRESS NOTES
Name: Peter Delarosa      : 1955      MRN: [id number]  Encounter Provider: Radha Lopes MD  Encounter Date: 2025   Encounter department: [facility]  :  Assessment & Plan    This is a 69-year-old male who was involved in a snowboarding accident about 3 weeks ago.  He was admitted to the hospital and workup ensued.  He was found to have mildly displaced right C5 facet fracture with extension to both the superior and inferior surfaces.  Additionally, there was displaced fractures of the bilateral C6 transverse processes  On the left there is displacement of a fragment into the transverse foramen.  He is scheduled to see Dr. Browning immediately after his visit with me today.  He did have a MRI study of the cervical spine obtained prior to this visit today.  This was completed on 2025.  This fracture showed continued subsidence and slight subluxation of C5 on C6 with some impingement of the spinal cord. Also appears has improved epidural hemtoam in the mid cervical spine, which was confirmed on CTA head/neck obtained the same day as mri.  I took a lot of time showing the patient and his wife the MRI study on the computer monitor.  I was able to show them the change from the initial CT study of the cervical spine that was obtained during his admission.  They were both able to see the worsening of the fracture that is now impinging on the spinal cord.  We also were able to pull up the CTA study and reviewed the sagittal and axial images of the fractures on that study as well.  Again, there certainly was progression of the fractures as described above.  I had a very lengthy discussion with the patient and his wife today about the importance of wearing the cervical collar at all times.  Interestingly, the patient has absolutely no pain in his neck.  In fact he was doing terrific and asked if he could snowboard again.  I took a lot of time today  explaining to him the urgency or the importance of him refraining from any strenuous type of activity or snowboarding until this fracture heals.  Considering how extremely well he is doing, it was difficult for me to recommend surgical intervention.  However, I did inform him that should the CT study of the cervical spine that I ordered for him to get in 4 weeks to look any worse, surgery would certainly have to be entertained.  I remain hopeful that this will be the case, but I could not guarantee him.  In the meantime, again, I urged him to wear his cervical collar at all times.  Should he have any change in his neurological condition, any increased pain, or any further questions or concerns, he is always free to contact me before his next visit.  I answered all of their questions.  He will be seen by my partner Dr. Browning momentarily for the vertebral injury.    Brian Lopes MD    History of Present Illness     Peter Delarosa is a 69 y.o. male who presents status post hospital admission with an MRI study of the cervical spine as well as CTA study of the head and neck after sustaining C5 and C6 cervical fractures after snowboarding event.  He is wearing his cervical collar at all times.  Remarkably, he is not complaining of any pain.  He is ambulating without any difficulty.  He looks very well overall.    HPI     Review of Systems   HENT:  Positive for tinnitus. Negative for trouble swallowing.    Eyes:  Positive for visual disturbance (double vision lasted for 2 weeks after ED stay).   Genitourinary:  Negative for enuresis.   Musculoskeletal:  Positive for neck stiffness (using cervical collar). Negative for gait problem, myalgias and neck pain.        Left shoulder pain, does not radiate    Neurological:  Negative for dizziness, speech difficulty, weakness, light-headedness, numbness and headaches.   Hematological:  Does not bruise/bleed easily.   All other systems reviewed and are negative.    I have  personally reviewed the MA's review of systems and made changes as necessary.    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  he reports that he has never smoked. He has never used smokeless tobacco. He reports that he does not currently use alcohol.  Current Outpatient Medications   Medication Instructions    diazepam (VALIUM) 5 mg tablet Take 1 tablet (5 mg total) by mouth every 8 (eight) hours as needed for muscle spasms for 3 days, THEN 1 tablet (5 mg total) every 12 (twelve) hours as needed for muscle spasms for 3 days, THEN 1 tablet (5 mg total) daily as needed for muscle spasms for up to 3 days.    DULoxetine (CYMBALTA) 30 mg, Oral, Daily    gabapentin (NEURONTIN) 100 mg, Oral, 3 times daily    lidocaine (LIDODERM) 5 % 2 patches, Topical, Daily, Remove & Discard patch within 12 hours or as directed by MD    methocarbamol (ROBAXIN) 750 mg tablet Take 1 tablet (750 mg total) by mouth every 6 (six) hours for 3 days, THEN 1 tablet (750 mg total) every 6 (six) hours as needed for muscle spasms for up to 8 days.    naloxone (NARCAN) 4 mg/0.1 mL nasal spray Administer 1 spray into a nostril. If no response after 2-3 minutes, give another dose in the other nostril using a new spray.     Allergies   Allergen Reactions    Tetracyclines & Related       MRI cervical spine 2/19/25    Worsening malalignment at C5-C6 and increasing edema at the articular pillar fracture site when comparing to the February 3, 2024 study.     Punctate focus of increased  "T2 signal within the right C5 hemicord, possibly artifactual. No associated hemorrhage.     Significant interval decrease in the ventral and dorsal epidural fluid collections. Persistent dorsal epidural fluid collection from the C1-C3 levels.     Multilevel cervical spondylosis, again worst at C5-C6 and C6-C7 where there is moderate spinal stenosis and advanced bilateral foraminal stenosis.     Redemonstrated abnormal right vertebral artery flow void.    CTA head/neck 2/19/25    CT Brain: No acute intracranial abnormality. Mild chronic microangiopathic changes.     CT Angiography: Redemonstrated findings of traumatic right vertebral artery dissection injury with persistent proximal occlusion. There is improved visualization of the distal V3 and V4 segments with persistent areas of segmental narrowing and vessel   wall irregularity. Interval development of focal dissection flap and pseudoaneurysm in the right intradural vertebral artery segment. Poorer visualization of the pre-PICA vertebral artery segment.     Interval distraction of the C5 vertebral body fracture with worsening anterior subluxation of C5 on C6 and partially perched appearance of the C5/C6 facets.     Stable C6 vertebral body and transverse process fractures.     Improving lower cervical ventral epidural.    Objective   Resp 16   Ht 6' 2\" (1.88 m)   Wt 82.6 kg (182 lb)   BMI 23.37 kg/m²     Physical Exam  Neurological Exam  Alert, oriented, fluent  EOMI  MAEW 5/5  Gait normal  Wearing an appropriately fitted collar.    Brian Lopes MD            "

## 2025-02-24 NOTE — PROGRESS NOTES
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  With his resolving epidural hemorrhage I believe it is relatively safe yet imperative that he starts antiplatelet therapy.  He will initiate aspirin 325 mg daily.  He will return in approximately 1 month with a new CTA of the neck.  Orders:    aspirin 325 mg tablet; Take 1 tablet (325 mg total) by mouth daily    CTA neck w wo contrast; Future        History of Present Illness     Peter Delaorsa is a 69 y.o. male who presents for follow-up of his vertebral artery dissection.  He reports no new strokelike symptoms.  Initially when he was hospitalized he was experiencing some double vision and vertigo however this has all resolved since discharge.      HPI     Review of Systems   Neurological:  Negative for seizures.     I have personally reviewed the MA's review of systems and made changes as necessary.    Past Medical History   Past Medical History:   Diagnosis Date    Arthritis     Closed fracture of cervical vertebra ([facility]) 2025    Lumbar herniated disc     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[facility]) 2025    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[facility]) 2025     Past Surgical History:   Procedure Laterality Date    HERNIA REPAIR  2005    KNEE ARTHROSCOPY Left 2005    KNEE ARTHROSCOPY Right 2005    SHOULDER ARTHROSCOPY Left     in the     TONSILLECTOMY       History reviewed. No pertinent family history.  he reports that he has never smoked. He has never used smokeless tobacco. He reports that he does not currently use alcohol.  Current Outpatient Medications   Medication Instructions    aspirin 325 mg, Oral, Daily    diazepam  "(VALIUM) 5 mg tablet Take 1 tablet (5 mg total) by mouth every 8 (eight) hours as needed for muscle spasms for 3 days, THEN 1 tablet (5 mg total) every 12 (twelve) hours as needed for muscle spasms for 3 days, THEN 1 tablet (5 mg total) daily as needed for muscle spasms for up to 3 days.    DULoxetine (CYMBALTA) 30 mg, Oral, Daily    gabapentin (NEURONTIN) 100 mg, Oral, 3 times daily    HYDROmorphone (DILAUDID) 2 mg, Every 4 hours PRN    lidocaine (LIDODERM) 5 % 2 patches, Topical, Daily, Remove & Discard patch within 12 hours or as directed by MD    methocarbamol (ROBAXIN) 750 mg tablet Take 1 tablet (750 mg total) by mouth every 6 (six) hours for 3 days, THEN 1 tablet (750 mg total) every 6 (six) hours as needed for muscle spasms for up to 8 days.    naloxone (NARCAN) 4 mg/0.1 mL nasal spray Administer 1 spray into a nostril. If no response after 2-3 minutes, give another dose in the other nostril using a new spray.     Allergies   Allergen Reactions    Tetracyclines & Related       Objective   /76 (BP Location: Left arm, Patient Position: Sitting, Cuff Size: Standard)   Pulse 71   Temp 98.3 °F (36.8 °C) (Temporal)   Resp 16   Ht 6' 2\" (1.88 m)   Wt 82.6 kg (182 lb)   SpO2 97%   BMI 23.37 kg/m²     Physical Exam  Constitutional:       Appearance: Normal appearance.   Eyes:      Pupils: Pupils are equal, round, and reactive to light.   Pulmonary:      Effort: Pulmonary effort is normal.   Neurological:      General: No focal deficit present.      Mental Status: He is alert and oriented to person, place, and time.      Motor: No weakness.      Gait: Gait normal.   Psychiatric:         Mood and Affect: Mood normal.         Behavior: Behavior normal.       Neurological Exam  Mental Status  Alert. Oriented to person, place, and time.    Cranial Nerves  CN III, IV, VI: Pupils equal round and reactive to light bilaterally.    Gait   Normal gait.              "

## 2025-03-03 ENCOUNTER — TELEPHONE (OUTPATIENT)
Age: 70
End: 2025-03-03

## 2025-03-03 NOTE — TELEPHONE ENCOUNTER
Reached out to patient regarding a letter he received in the mail. Advised the CT was reviewed at his last visit 2/24 and there is no immediate action needed at this time. Reviewed with him another CT was ordered to be fone on 3/19 prior to his next appt on 3/24.     He stated an understanding and was appreciative of the call back.

## 2025-03-03 NOTE — TELEPHONE ENCOUNTER
Pt called after receiving letter from Radiology. Pt concerned about contents, advised pt that we have already set up his appt and he is going to have more imaging prior but pt wishes to speak to a clinical staff.  Can we call pt back  to discuss?  Thank You

## 2025-03-19 ENCOUNTER — HOSPITAL ENCOUNTER (OUTPATIENT)
Dept: CT IMAGING | Facility: HOSPITAL | Age: 70
Discharge: HOME | End: 2025-03-19
Attending: RADIOLOGY
Payer: MEDICARE

## 2025-03-19 ENCOUNTER — TELEPHONE (OUTPATIENT)
Dept: NEUROSURGERY | Facility: CLINIC | Age: 70
End: 2025-03-19

## 2025-03-19 DIAGNOSIS — I77.74 VERTEBRAL ARTERY DISSECTION (HCC): ICD-10-CM

## 2025-03-19 DIAGNOSIS — S12.500A C6 CERVICAL FRACTURE (HCC): ICD-10-CM

## 2025-03-19 PROCEDURE — 72125 CT NECK SPINE W/O DYE: CPT

## 2025-03-19 PROCEDURE — 70498 CT ANGIOGRAPHY NECK: CPT

## 2025-03-19 RX ADMIN — IOHEXOL 85 ML: 350 INJECTION, SOLUTION INTRAVENOUS at 11:20

## 2025-03-19 NOTE — TELEPHONE ENCOUNTER
Call received from [facility] regarding results of CTA neck. Will route to providers and assist as needed.

## 2025-03-24 ENCOUNTER — OFFICE VISIT (OUTPATIENT)
Dept: NEUROSURGERY | Facility: CLINIC | Age: 70
End: 2025-03-24
Payer: MEDICARE

## 2025-03-24 VITALS
DIASTOLIC BLOOD PRESSURE: 94 MMHG | RESPIRATION RATE: 16 BRPM | HEART RATE: 61 BPM | OXYGEN SATURATION: 98 % | HEIGHT: 74 IN | TEMPERATURE: 98.1 F | SYSTOLIC BLOOD PRESSURE: 166 MMHG | BODY MASS INDEX: 23.36 KG/M2 | WEIGHT: 182 LBS

## 2025-03-24 DIAGNOSIS — I77.74 VERTEBRAL ARTERY DISSECTION (HCC): Primary | ICD-10-CM

## 2025-03-24 PROCEDURE — 99213 OFFICE O/P EST LOW 20 MIN: CPT | Performed by: SPECIALIST

## 2025-03-24 NOTE — PROGRESS NOTES
Name: Peter Delarosa      : 1955      MRN: [id number]  Encounter Provider: Radha Lopes MD  Encounter Date: 3/24/2025   Encounter department: [facility]  :  Assessment & Plan    Patient is here for follow-up for his cervical spine fracture.  He does state that he does intermittently take the collar off, especially during this last week when he was trying to test range of motion.  I reviewed the CT scan of the cervical spine and it does show some early healing callus formation, but perhaps there is very slight further anterolisthesis of C5 on C6.  However along the posterior vertebral bodies of C5 and C6 as well as along the facet joints of C5 and C6, there may be some fusion of the vertebral bodies upon each other. Overall he does complain of any neck pain.  He also states that his range of motion is pretty good.  I again took a lot of time today encouraging him to wear his cervical collar at all times.  I also explained to him that his fracture still not fully healed, and the risk of worsening is still not insignificant.  However, considering there is some early healing, and with him wearing the cervical collar at all times, they may be a chance that this will heal in place and we can avert surgery.  However, I could not guarantee him of this, especially if he continues to intermittently take off the collar.  We will see him back in 1 month with another CT of the cervical spine.  At that point we can have further discussions about the next steps.  Again, I discouraged him from going to the gym and having an rigorous workout activity until this is healed.  I also discouraged him from lifting anything too heavy.  I want him to take it easy until this is healed, and we will see how he is in 1 month's time.  I Have ordered CT cervical spine as well as flexion-extension views of the cervical spine.    Brian Lopes        Peter Delarosa is a 69 y.o. male who  presents with a repeat CTA of the cervical spine.  Overall he states that he has been wearing his collar, but has also been taking it off at times.  He said during this last week he has been wearing it less, as he wanted to test his range of motion in his neck.  I did explain to him during the last visit that it is very important that he wear his cervical collar at all times.  He is very enthusiastic about resuming kayaking, and I informed him that I do not think that this is a good decision based on his recent vertebral body fractures, slight C5 on C6 subluxation, and the real risks of spinal cord injury in the future with another injury.  He denies bowel and bladder incontinence or Gait dysfunction.    HPI     Review of Systems   Genitourinary:  Negative for enuresis.   Musculoskeletal:  Negative for gait problem, myalgias, neck pain and neck stiffness.   Neurological:  Negative for dizziness, weakness, numbness and headaches.   Hematological:  Bruises/bleeds easily ([id number]).   All other systems reviewed and are negative.    I have personally reviewed the MA's review of systems and made changes as necessary.    Past Medical History   Past Medical History:   Diagnosis Date    Arthritis     Closed fracture of cervical vertebra (HCC) 02/03/2025    Lumbar herniated disc     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HCC) 02/03/2025    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HCC) 02/03/2025     Past Surgical History:   Procedure Laterality Date    HERNIA REPAIR  01/03/2005    KNEE ARTHROSCOPY Left 11/30/2005    KNEE ARTHROSCOPY Right 07/20/2005    SHOULDER ARTHROSCOPY Left     in the 1980's    TONSILLECTOMY       History reviewed. No pertinent family history.  he reports that he has never smoked. He has never used smokeless tobacco. He reports that he does not currently use alcohol. He reports that he does not currently use drugs.  Current Outpatient Medications   Medication Instructions    aspirin 325 mg, Oral, Daily     "diazepam (VALIUM) 5 mg tablet Take 1 tablet (5 mg total) by mouth every 8 (eight) hours as needed for muscle spasms for 3 days, THEN 1 tablet (5 mg total) every 12 (twelve) hours as needed for muscle spasms for 3 days, THEN 1 tablet (5 mg total) daily as needed for muscle spasms for up to 3 days.    DULoxetine (CYMBALTA) 30 mg, Oral, Daily    gabapentin (NEURONTIN) 100 mg, Oral, 3 times daily    HYDROmorphone (DILAUDID) 2 mg, Every 4 hours PRN    lidocaine (LIDODERM) 5 % 2 patches, Topical, Daily, Remove & Discard patch within 12 hours or as directed by MD    methocarbamol (ROBAXIN) 750 mg tablet Take 1 tablet (750 mg total) by mouth every 6 (six) hours for 3 days, THEN 1 tablet (750 mg total) every 6 (six) hours as needed for muscle spasms for up to 8 days.    naloxone (NARCAN) 4 mg/0.1 mL nasal spray Administer 1 spray into a nostril. If no response after 2-3 minutes, give another dose in the other nostril using a new spray.     Allergies   Allergen Reactions    Tetracyclines & Related       CTA cervical spine  1.  Healing right C5 facet fracture, with interval callus formation bridging the fracture fragments. Anterior subluxation of the articular pillar of C5 relative to C6 again demonstrated, stable to minimally worse when compared to the most recent cervical   spine MRI, but worse when compared to the initial cervical spine CT from 2/3/2024.  2.  Displaced right C6 transverse process fracture again demonstrated, with multiple bone fragments extending into and narrowing the right transverse foramen.  3.  Left C6 vertebral body fracture extending into the left transverse process and left neural foramen, likely with some interval healing when compared to the prior study.  Objective   Resp 16   Ht 6' 2\" (1.88 m)   Wt 82.6 kg (182 lb)   BMI 23.37 kg/m²     Physical Exam  Neurological Exam  Alert, oriented, fluent  No facial droop  No aphasia  MAEW  Wearing cervical collar            "

## 2025-03-25 ENCOUNTER — TELEPHONE (OUTPATIENT)
Age: 70
End: 2025-03-25

## 2025-03-25 ENCOUNTER — TELEPHONE (OUTPATIENT)
Dept: NEUROSURGERY | Facility: CLINIC | Age: 70
End: 2025-03-25

## 2025-03-25 NOTE — TELEPHONE ENCOUNTER
3/25/25 PLEASE CALL PT TO R/S CANCELLED [facility].   Appointment canceled for Peter Delarosa ([id number])   Visit type: [facility]   5/2/2025 2:15 PM (45 minutes) with TIMA Naqvi in [facility] [place]      Reason for cancellation: Canceled via MyChart

## 2025-03-25 NOTE — TELEPHONE ENCOUNTER
Contacted patient to scheduled his studies cta head and ct cervical spine and follow up appointments he stated he did not want to come back to our office he is going to see someone else sent message to [facility] please advise as to if I should cancel all appointments after you look into this as he is scheduled with [facility] and [facility].